# Patient Record
Sex: MALE | Race: WHITE | NOT HISPANIC OR LATINO | Employment: OTHER | URBAN - NONMETROPOLITAN AREA
[De-identification: names, ages, dates, MRNs, and addresses within clinical notes are randomized per-mention and may not be internally consistent; named-entity substitution may affect disease eponyms.]

---

## 2017-04-10 ENCOUNTER — TRANSCRIBE ORDERS (OUTPATIENT)
Dept: LAB | Facility: HOSPITAL | Age: 47
End: 2017-04-10

## 2017-04-10 ENCOUNTER — HOSPITAL ENCOUNTER (OUTPATIENT)
Dept: GENERAL RADIOLOGY | Facility: HOSPITAL | Age: 47
Discharge: HOME | End: 2017-04-10
Admitting: NURSE PRACTITIONER

## 2017-04-10 DIAGNOSIS — M54.2 CERVICALGIA: ICD-10-CM

## 2017-04-10 DIAGNOSIS — M54.2 CERVICALGIA: Primary | ICD-10-CM

## 2017-04-10 PROCEDURE — 72050 X-RAY EXAM NECK SPINE 4/5VWS: CPT | Performed by: RADIOLOGY

## 2017-04-10 PROCEDURE — 72050 X-RAY EXAM NECK SPINE 4/5VWS: CPT

## 2017-05-12 ENCOUNTER — TRANSCRIBE ORDERS (OUTPATIENT)
Dept: ADMINISTRATIVE | Facility: HOSPITAL | Age: 47
End: 2017-05-12

## 2017-05-12 DIAGNOSIS — M54.2 NECK PAIN: Primary | ICD-10-CM

## 2017-05-15 ENCOUNTER — HOSPITAL ENCOUNTER (OUTPATIENT)
Dept: MRI IMAGING | Facility: HOSPITAL | Age: 47
Discharge: HOME | End: 2017-05-15
Admitting: NURSE PRACTITIONER

## 2017-05-15 DIAGNOSIS — M54.2 NECK PAIN: ICD-10-CM

## 2017-05-15 PROCEDURE — 72141 MRI NECK SPINE W/O DYE: CPT

## 2017-05-15 PROCEDURE — 72141 MRI NECK SPINE W/O DYE: CPT | Performed by: RADIOLOGY

## 2017-06-20 ENCOUNTER — OFFICE VISIT (OUTPATIENT)
Dept: NEUROSURGERY | Facility: CLINIC | Age: 47
End: 2017-06-20

## 2017-06-20 VITALS
DIASTOLIC BLOOD PRESSURE: 75 MMHG | SYSTOLIC BLOOD PRESSURE: 120 MMHG | HEIGHT: 70 IN | WEIGHT: 176.8 LBS | TEMPERATURE: 97.7 F | OXYGEN SATURATION: 99 % | BODY MASS INDEX: 25.31 KG/M2 | HEART RATE: 88 BPM

## 2017-06-20 DIAGNOSIS — M50.30 DEGENERATIVE DISC DISEASE, CERVICAL: ICD-10-CM

## 2017-06-20 DIAGNOSIS — M48.02 SPINAL STENOSIS IN CERVICAL REGION: Primary | ICD-10-CM

## 2017-06-20 DIAGNOSIS — M50.20 HNP (HERNIATED NUCLEUS PULPOSUS), CERVICAL: ICD-10-CM

## 2017-06-20 PROCEDURE — 99213 OFFICE O/P EST LOW 20 MIN: CPT | Performed by: NEUROLOGICAL SURGERY

## 2017-06-20 RX ORDER — GABAPENTIN 600 MG/1
300 TABLET ORAL 3 TIMES DAILY
COMMUNITY
End: 2023-02-21 | Stop reason: SDUPTHER

## 2017-06-20 RX ORDER — METHOCARBAMOL 750 MG/1
750 TABLET, FILM COATED ORAL 4 TIMES DAILY PRN
COMMUNITY

## 2017-06-20 NOTE — PROGRESS NOTES
Reji Linares  1970  [id number]      Chief Complaint   Patient presents with   • Back Pain       HISTORY OF PRESENT ILLNESS:  [This is a 46-year-old male who I've seen in the past with advanced and diffused lumbar degenerative osteoarthritis who now is having issues with pain in the cervical region radiating into his left upper extremity.  He is had a cervical MRI and is referred for neurosurgical consultation.  He has not been to physical therapy.  The issues that he has in his lumbar area however her fixed and unchanged. ]    History reviewed. No pertinent past medical history.    No past surgical history on file.    Family History   Problem Relation Age of Onset   • Heart disease Mother    • Arthritis Mother    • Diabetes Mother    • Heart disease Father    • Arthritis Father        Social History     Social History   • Marital status:      Spouse name: N/A   • Number of children: N/A   • Years of education: N/A     Occupational History   • Not on file.     Social History Main Topics   • Smoking status: Never Smoker   • Smokeless tobacco: Not on file   • Alcohol use No   • Drug use: No   • Sexual activity: Defer     Other Topics Concern   • Not on file     Social History Narrative       No Known Allergies      Current Outpatient Prescriptions:   •  gabapentin (NEURONTIN) 300 MG capsule, Take 300 mg by mouth every night at bedtime., Disp: , Rfl:   •  methocarbamol (ROBAXIN) 750 MG tablet, Take 750 mg by mouth 4 (Four) Times a Day As Needed for Muscle Spasms., Disp: , Rfl:   •  esomeprazole (NexIUM) 40 MG capsule, Take 40 mg by mouth daily., Disp: , Rfl:   •  HYDROcodone-acetaminophen (NORCO) 5-325 MG per tablet, Take 1 tablet by mouth every 6 (six) hours as needed., Disp: , Rfl:   •  hydrOXYzine (ATARAX) 25 MG tablet, Take 25 mg by mouth 2 (two) times a day., Disp: , Rfl:   •  nabumetone (RELAFEN) 750 MG tablet, Take 1 tablet by mouth 2 (two) times a day., Disp: 60 tablet, Rfl: 2    Review of Systems    Constitutional: Positive for fatigue. Negative for activity change, appetite change, chills, diaphoresis, fever and unexpected weight change.   HENT: Positive for hearing loss and tinnitus. Negative for congestion, dental problem, drooling, ear discharge, ear pain, facial swelling, mouth sores, nosebleeds, postnasal drip, rhinorrhea, sinus pressure, sneezing, sore throat, trouble swallowing and voice change.    Eyes: Negative for photophobia, pain, discharge, redness, itching and visual disturbance.   Respiratory: Negative for apnea, cough, choking, chest tightness, shortness of breath, wheezing and stridor.    Cardiovascular: Negative for chest pain, palpitations and leg swelling.   Gastrointestinal: Negative for abdominal distention, abdominal pain, anal bleeding, blood in stool, constipation, diarrhea, nausea, rectal pain and vomiting.   Endocrine: Negative for cold intolerance, heat intolerance, polydipsia, polyphagia and polyuria.   Genitourinary: Negative for decreased urine volume, difficulty urinating, dysuria, enuresis, flank pain, frequency, genital sores, hematuria and urgency.   Musculoskeletal: Positive for arthralgias, back pain, gait problem, joint swelling, myalgias, neck pain and neck stiffness.   Skin: Negative for color change, pallor, rash and wound.   Allergic/Immunologic: Negative for environmental allergies, food allergies and immunocompromised state.   Neurological: Positive for numbness and headaches. Negative for dizziness, tremors, seizures, syncope, facial asymmetry, speech difficulty, weakness and light-headedness.   Hematological: Negative for adenopathy. Does not bruise/bleed easily.   Psychiatric/Behavioral: Negative for agitation, behavioral problems, confusion, decreased concentration, dysphoric mood, hallucinations, self-injury, sleep disturbance and suicidal ideas. The patient is not nervous/anxious and is not hyperactive.        Vitals:    06/20/17 1433   BP: 120/75   Pulse: 88  "  Temp: 97.7 °F (36.5 °C)   SpO2: 99%   Weight: 176 lb 12.8 oz (80.2 kg)   Height: 70\" (177.8 cm)       Neurological Examination:  Mental status/speech: The patient is alert and oriented.  Speech is clear without aphysia or dysarthria.  No overt cognitive deficits.    Cranial nerve examination:    Olfaction: Smell is intact.  Vision: Vision is intact without visual field abnormalities.  Funduscopic examination is normal.  No pupillary irregularity.  Ocular motor examination: The extraocular muscles are intact.  There is no diplopia.  The pupil is round and reactive to both light and accommodation.  There is no nystagmus.  Facial movement/sensation: There is no facial weakness.  Sensation is intact in the first, second, and third divisions of the trigeminal nerve.  The corneal reflex is intact.  Auditory: Hearing is intact to finger rub bilaterally.  Cranial nerves IX, X, XI, XII: Phonation is normal.  No dysphagia.  Tongue is protruded in the midline without atrophy.  The gag reflex is intact.  Shoulder shrug is normal.    Musculoligamentous ligamentous examination: [He has significant decreased range of motion of the cervical and lumbar area.  Straight leg raising Conway and flip test produces severe low back pain.  He has increasing pain with bending and squatting and twisting at this examination.  The deep tendon reflexes are generally hypoactive both in the upper and lower extremities.  There is no weakness sensory loss or reflex asymmetry however. ]          Medical Decision Making:     Diagnostic Data Set:  The cervical MRI shows marked disc degeneration, spinal stenosis, and disc protrusion C4-C5; C5-C6 and C6-C7      Assessment:  Advanced degenerative osteoarthritis in the cervical spine associated with spinal stenosis          Recommendations:  I would recommend a conservative course of physical therapy and continue his medications.  He has high-grade spinal stenosis both in the cervical and lumbar regions. "  I would hope we can manage this conservatively.  Although surgical intervention will decompress the spinal canal it is not going to alleviate all the symptoms that he has in the context of degenerative disc disease.        I greatly appreciate the opportunity to see and evaluate this individual.  If you have questions or concerns regarding issues that I may have overlooked please call me at any time: [phone].  Wilmer Holman M.D.  [facility]  [address] [place] [address].  [place] [phone]

## 2017-06-22 DIAGNOSIS — M50.00 INTERVERTEBRAL CERVICAL DISC DISORDER WITH MYELOPATHY, CERVICAL REGION: Primary | ICD-10-CM

## 2018-10-09 ENCOUNTER — TRANSCRIBE ORDERS (OUTPATIENT)
Dept: ADMINISTRATIVE | Facility: HOSPITAL | Age: 48
End: 2018-10-09

## 2018-10-09 DIAGNOSIS — M51.9 LUMBAR DISC DISEASE: Primary | ICD-10-CM

## 2018-10-12 ENCOUNTER — HOSPITAL ENCOUNTER (OUTPATIENT)
Dept: MRI IMAGING | Facility: HOSPITAL | Age: 48
Discharge: HOME | End: 2018-10-12
Admitting: NURSE PRACTITIONER

## 2018-10-12 DIAGNOSIS — M51.9 LUMBAR DISC DISEASE: ICD-10-CM

## 2018-10-12 PROCEDURE — 72148 MRI LUMBAR SPINE W/O DYE: CPT | Performed by: RADIOLOGY

## 2018-10-12 PROCEDURE — 72148 MRI LUMBAR SPINE W/O DYE: CPT

## 2018-10-18 ENCOUNTER — OFFICE VISIT (OUTPATIENT)
Dept: NEUROSURGERY | Facility: CLINIC | Age: 48
End: 2018-10-18

## 2018-10-18 VITALS
WEIGHT: 177 LBS | RESPIRATION RATE: 18 BRPM | TEMPERATURE: 98.2 F | SYSTOLIC BLOOD PRESSURE: 118 MMHG | HEIGHT: 70 IN | OXYGEN SATURATION: 96 % | DIASTOLIC BLOOD PRESSURE: 74 MMHG | HEART RATE: 72 BPM | BODY MASS INDEX: 25.34 KG/M2

## 2018-10-18 DIAGNOSIS — M51.36 DEGENERATIVE DISC DISEASE, LUMBAR: Primary | ICD-10-CM

## 2018-10-18 PROCEDURE — 99203 OFFICE O/P NEW LOW 30 MIN: CPT | Performed by: NEUROLOGICAL SURGERY

## 2018-10-18 RX ORDER — HYDROCODONE BITARTRATE AND ACETAMINOPHEN 10; 325 MG/1; MG/1
1 TABLET ORAL 3 TIMES DAILY
COMMUNITY

## 2018-10-18 RX ORDER — FLUOXETINE 10 MG/1
10 CAPSULE ORAL DAILY
COMMUNITY
End: 2022-01-05

## 2018-10-18 NOTE — PROGRESS NOTES
Reij Linares  1970  [id number]                        CHIEF COMPLAINT:   Cervical and left upper extremity pain; back and bilateral leg pain         MEDICAL HISTORY SINCE LAST ENCOUNTER:  This is a 47-year-old male I saw several years ago with diffuse advanced degenerative osteoarthritis changes in the cervical and lumbar spine.  He had multiple disc degeneration facet arthrosis.  It was my opinion that time that surgery was not a consideration.  Unfortunately he had severe degenerative changes which have gotten progressively worse.  He is currently under pain management, is receiving injections which helped marginally.    He has pain in the cervical area which radiates into both upper extremities and ill-defined nondermatomal distribution.  The pain in the lumbar area radiates into his left lower extremity and is worse with prolonged sitting standing and walking.  He has been to physical therapy to no avail.  His MRIs been repeated and is referred for reevaluation.          History reviewed. No pertinent past medical history.         History reviewed. No pertinent surgical history.           Family History   Problem Relation Age of Onset   • Heart disease Mother    • Arthritis Mother    • Diabetes Mother    • Heart disease Father    • Arthritis Father               Social History     Social History   • Marital status:      Spouse name: N/A   • Number of children: N/A   • Years of education: N/A     Occupational History   • Not on file.     Social History Main Topics   • Smoking status: Never Smoker   • Smokeless tobacco: Never Used   • Alcohol use No   • Drug use: No   • Sexual activity: Defer     Other Topics Concern   • Not on file     Social History Narrative   • No narrative on file            No Known Allergies           Current Outpatient Prescriptions:   •  esomeprazole (NexIUM) 40 MG capsule, Take 40 mg by mouth daily., Disp: , Rfl:   •  FLUoxetine (PROzac) 10 MG capsule, Take 10 mg by mouth  "Daily., Disp: , Rfl:   •  gabapentin (NEURONTIN) 300 MG capsule, Take 300 mg by mouth every night at bedtime., Disp: , Rfl:   •  HYDROcodone-acetaminophen (NORCO)  MG per tablet, Take 1 tablet by mouth Every 6 (Six) Hours As Needed for Moderate Pain ., Disp: , Rfl:   •  hydrOXYzine (ATARAX) 25 MG tablet, Take 25 mg by mouth 2 (two) times a day., Disp: , Rfl:   •  methocarbamol (ROBAXIN) 750 MG tablet, Take 750 mg by mouth 4 (Four) Times a Day As Needed for Muscle Spasms., Disp: , Rfl:   •  nabumetone (RELAFEN) 750 MG tablet, Take 1 tablet by mouth 2 (two) times a day., Disp: 60 tablet, Rfl: 2  •  HYDROcodone-acetaminophen (NORCO) 5-325 MG per tablet, Take 1 tablet by mouth every 6 (six) hours as needed., Disp: , Rfl:          Review of Systems   Constitutional: Positive for activity change and fatigue.   HENT: Positive for hearing loss and tinnitus.    Endocrine: Positive for polyuria.   Genitourinary: Positive for urgency.   Musculoskeletal: Positive for arthralgias, back pain, gait problem, myalgias, neck pain and neck stiffness.   Neurological: Positive for weakness and headaches.   Psychiatric/Behavioral: Positive for agitation, decreased concentration, dysphoric mood and sleep disturbance. The patient is nervous/anxious.    All other systems reviewed and are negative.              Vitals:    10/18/18 1614   BP: 118/74   BP Location: Left arm   Patient Position: Sitting   Pulse: 72   Resp: 18   Temp: 98.2 °F (36.8 °C)   SpO2: 96%   Weight: 80.3 kg (177 lb)   Height: 177.8 cm (70\")               EXAMINATION: He has limitation or range of motion of the cervical lumbar area.  His strength in the upper extremity is normal without reflex asymmetry.  That in his lumbar area shows a positive straight leg raising on the left side.  The Achilles reflex on the left is diminished as compared to the right.  He has decreased sensation of both lower extremities at L5 and S1.            MEDICAL DECISION MAKING: I have " reviewed his cervical and lumbar MRI including the one done more recently.  He has advanced degenerative osteoarthritis of the lumbar spine with multiple bulges of the intervertebral disc L2-L3, L3-L4, L4-L5 and L5-S1.            ASSESSMENT/DISPOSITION:  Although he has significant degenerative disc disease and multiple bulges of intravertebral disc I do not think there is any operation this can resolve the issues that he has.  Certainly he should not have a multilevel spinal fusion within the anticipation of improvement.  I think he has occupational issues and limits on his functional capacity.  This includes avoidance of repetitive bending, stooping climbing and crawling.  He should not sit stand or walk for more than 15-20 minutes without change of position.  He is able to lift only 5-10 pounds repetitively.  I have advised him to seek a medical [facility].  I do not think there is any operation or medicine is going to improve his condition to allow him to return to work.              I APPRECIATE THE OPPORTUNITY OF THIS REFERRAL. PLEASE CALL IF ANY       QUESTIONS [phone]

## 2019-01-03 ENCOUNTER — OFFICE VISIT (OUTPATIENT)
Dept: NEUROSURGERY | Facility: CLINIC | Age: 49
End: 2019-01-03

## 2019-01-03 VITALS
TEMPERATURE: 98 F | RESPIRATION RATE: 18 BRPM | WEIGHT: 178 LBS | SYSTOLIC BLOOD PRESSURE: 126 MMHG | HEART RATE: 81 BPM | BODY MASS INDEX: 25.48 KG/M2 | OXYGEN SATURATION: 99 % | DIASTOLIC BLOOD PRESSURE: 86 MMHG | HEIGHT: 70 IN

## 2019-01-03 DIAGNOSIS — M48.02 SPINAL STENOSIS IN CERVICAL REGION: ICD-10-CM

## 2019-01-03 DIAGNOSIS — M50.30 DEGENERATIVE DISC DISEASE, CERVICAL: ICD-10-CM

## 2019-01-03 DIAGNOSIS — M50.20 HNP (HERNIATED NUCLEUS PULPOSUS), CERVICAL: ICD-10-CM

## 2019-01-03 DIAGNOSIS — M51.36 DEGENERATIVE DISC DISEASE, LUMBAR: Primary | ICD-10-CM

## 2019-01-03 PROCEDURE — 99213 OFFICE O/P EST LOW 20 MIN: CPT | Performed by: NEUROLOGICAL SURGERY

## 2019-01-03 NOTE — PROGRESS NOTES
Reji Linares  1970  [id number]                        CHIEF COMPLAINT:  Lumbar pain radiating into both lower extremities.          MEDICAL HISTORY SINCE LAST ENCOUNTER:  He has shown very little improvement with physical therapy medication and an exercise program.  He has achieved maximal medical improvement in my opinion.           Past Medical History:   Diagnosis Date   • Headache    • Low back pain             History reviewed. No pertinent surgical history.           Family History   Problem Relation Age of Onset   • Heart disease Mother    • Arthritis Mother    • Diabetes Mother    • Heart disease Father    • Arthritis Father               Social History     Socioeconomic History   • Marital status:      Spouse name: Not on file   • Number of children: Not on file   • Years of education: Not on file   • Highest education level: Not on file   Social Needs   • Financial resource strain: Not on file   • Food insecurity - worry: Not on file   • Food insecurity - inability: Not on file   • Transportation needs - medical: Not on file   • Transportation needs - non-medical: Not on file   Occupational History   • Not on file   Tobacco Use   • Smoking status: Never Smoker   • Smokeless tobacco: Never Used   Substance and Sexual Activity   • Alcohol use: No   • Drug use: No   • Sexual activity: Defer   Other Topics Concern   • Not on file   Social History Narrative   • Not on file            No Known Allergies           Current Outpatient Medications:   •  esomeprazole (NexIUM) 40 MG capsule, Take 40 mg by mouth daily., Disp: , Rfl:   •  FLUoxetine (PROzac) 10 MG capsule, Take 10 mg by mouth Daily., Disp: , Rfl:   •  gabapentin (NEURONTIN) 300 MG capsule, Take 300 mg by mouth every night at bedtime., Disp: , Rfl:   •  HYDROcodone-acetaminophen (NORCO)  MG per tablet, Take 1 tablet by mouth Every 6 (Six) Hours As Needed for Moderate Pain ., Disp: , Rfl:   •  HYDROcodone-acetaminophen (NORCO) 5-325 MG  "per tablet, Take 1 tablet by mouth every 6 (six) hours as needed., Disp: , Rfl:   •  hydrOXYzine (ATARAX) 25 MG tablet, Take 25 mg by mouth 2 (two) times a day., Disp: , Rfl:   •  methocarbamol (ROBAXIN) 750 MG tablet, Take 750 mg by mouth 4 (Four) Times a Day As Needed for Muscle Spasms., Disp: , Rfl:   •  nabumetone (RELAFEN) 750 MG tablet, Take 1 tablet by mouth 2 (two) times a day., Disp: 60 tablet, Rfl: 2         Review of Systems   Constitutional: Positive for chills and fatigue.   HENT: Positive for ear pain, hearing loss and tinnitus.    Genitourinary: Positive for difficulty urinating, frequency and urgency.   Musculoskeletal: Positive for arthralgias, back pain, myalgias, neck pain and neck stiffness.   Neurological: Positive for tremors, weakness, numbness and headaches.   Psychiatric/Behavioral: Positive for agitation, decreased concentration, dysphoric mood and sleep disturbance. The patient is nervous/anxious.    All other systems reviewed and are negative.              Vitals:    01/03/19 1617   BP: 126/86   BP Location: Right arm   Patient Position: Sitting   Pulse: 81   Resp: 18   Temp: 98 °F (36.7 °C)   SpO2: 99%   Weight: 80.7 kg (178 lb)   Height: 177.8 cm (70\")               EXAMINATION: Has significant limitation or range of motion lumbar spine.  Decreased sensation in S1 bilaterally with a diminished Achilles reflex.              MEDICAL DECISION MAKING: Reviewed his lumbar MRI diagnostic studies once again            ASSESSMENT/DISPOSITION: I remain of the opinion that surgical intervention is not going to resolve the issues that he has to allow her to return to the type of work to do did previously.  I have urged that he continue to pursue his medical [facility].  I do not know any options that would be meaningful otherwise.  He has extensive disc disease for which there is no medical evidence or assurance that surgical intervention would resolve symptoms and increase  " functionality.]              I APPRECIATE THE OPPORTUNITY OF THIS REFERRAL. PLEASE CALL IF ANY       QUESTIONS [phone]

## 2019-02-14 ENCOUNTER — OFFICE VISIT (OUTPATIENT)
Dept: NEUROSURGERY | Facility: CLINIC | Age: 49
End: 2019-02-14

## 2019-02-14 VITALS
TEMPERATURE: 97 F | DIASTOLIC BLOOD PRESSURE: 66 MMHG | WEIGHT: 156 LBS | OXYGEN SATURATION: 98 % | HEIGHT: 71 IN | BODY MASS INDEX: 21.84 KG/M2 | SYSTOLIC BLOOD PRESSURE: 108 MMHG | RESPIRATION RATE: 20 BRPM | HEART RATE: 72 BPM

## 2019-02-14 DIAGNOSIS — M48.02 SPINAL STENOSIS IN CERVICAL REGION: ICD-10-CM

## 2019-02-14 DIAGNOSIS — M50.20 HNP (HERNIATED NUCLEUS PULPOSUS), CERVICAL: ICD-10-CM

## 2019-02-14 DIAGNOSIS — M51.36 DEGENERATIVE DISC DISEASE, LUMBAR: Primary | ICD-10-CM

## 2019-02-14 PROCEDURE — 99213 OFFICE O/P EST LOW 20 MIN: CPT | Performed by: NEUROLOGICAL SURGERY

## 2019-02-14 NOTE — PROGRESS NOTES
Reji Linares  1970  [id number]                        CHIEF COMPLAINT: Low back pain radiating into both lower extremities; cervical pain.         MEDICAL HISTORY SINCE LAST ENCOUNTER: He has plateaued and unfortunately is at maximal improvement.  Do not know of any modality that is going to increase his functionality and limit his discomfort.  I reviewed his diagnostic studies in detail once again to determine if there is a condition that can be helped surgically.  I do not find that.  Studies have shown degenerative disc disease for which there is no cure.  This is progressive disease.           Past Medical History:   Diagnosis Date   • Headache    • Low back pain             History reviewed. No pertinent surgical history.           Family History   Problem Relation Age of Onset   • Heart disease Mother    • Arthritis Mother    • Diabetes Mother    • Heart disease Father    • Arthritis Father               Social History     Socioeconomic History   • Marital status:      Spouse name: Not on file   • Number of children: Not on file   • Years of education: Not on file   • Highest education level: Not on file   Social Needs   • Financial resource strain: Not on file   • Food insecurity - worry: Not on file   • Food insecurity - inability: Not on file   • Transportation needs - medical: Not on file   • Transportation needs - non-medical: Not on file   Occupational History   • Not on file   Tobacco Use   • Smoking status: Never Smoker   • Smokeless tobacco: Never Used   Substance and Sexual Activity   • Alcohol use: No   • Drug use: No   • Sexual activity: Defer   Other Topics Concern   • Not on file   Social History Narrative   • Not on file            No Known Allergies           Current Outpatient Medications:   •  esomeprazole (NexIUM) 40 MG capsule, Take 40 mg by mouth daily., Disp: , Rfl:   •  FLUoxetine (PROzac) 10 MG capsule, Take 10 mg by mouth Daily., Disp: , Rfl:   •  gabapentin (NEURONTIN)  "300 MG capsule, Take 300 mg by mouth every night at bedtime., Disp: , Rfl:   •  HYDROcodone-acetaminophen (NORCO)  MG per tablet, Take 1 tablet by mouth Every 6 (Six) Hours As Needed for Moderate Pain ., Disp: , Rfl:   •  HYDROcodone-acetaminophen (NORCO) 5-325 MG per tablet, Take 1 tablet by mouth every 6 (six) hours as needed., Disp: , Rfl:   •  hydrOXYzine (ATARAX) 25 MG tablet, Take 25 mg by mouth 2 (two) times a day., Disp: , Rfl:   •  methocarbamol (ROBAXIN) 750 MG tablet, Take 750 mg by mouth 4 (Four) Times a Day As Needed for Muscle Spasms., Disp: , Rfl:   •  nabumetone (RELAFEN) 750 MG tablet, Take 1 tablet by mouth 2 (two) times a day., Disp: 60 tablet, Rfl: 2         Review of Systems   Constitutional: Positive for fatigue.   HENT: Positive for hearing loss.    Respiratory: Positive for apnea.    Genitourinary: Positive for frequency.   Musculoskeletal: Positive for arthralgias, back pain, myalgias and neck pain. Negative for joint swelling.   Neurological: Positive for numbness and headaches.   Psychiatric/Behavioral: Positive for agitation, decreased concentration, dysphoric mood and sleep disturbance. The patient is nervous/anxious.                Vitals:    02/14/19 1512   BP: 128/85   BP Location: Right arm   Patient Position: Sitting   Pulse: 78   Resp: 20   Temp: 97 °F (36.1 °C)   SpO2: 98%   Weight: 81.6 kg (180 lb)   Height: 177.8 cm (70\")               EXAMINATION: He has limitation of the range of motion of the cervical and lumbar spine.  I do not find evidence of weakness although he does have decreased sensation in S1 bilaterally with diminished Achilles reflex.            MEDICAL DECISION MAKING: He has achieved maximum improvement.           ASSESSMENT/DISPOSITION: It is my opinion that he has a functional limitation as a consequence of degenerative osteoarthritis, facet arthrosis and disc degeneration.  This includes avoidance of repetitive bending, stooping, climbing, crawling and " balancing.  He may use his upper extremities.  Standing sitting and walking should be limited to 15-20 minutes without a break.  His maximal lifting is 5-10 pounds.  These are permanent restrictions.  For complete and further limitations a functional capacity evaluation would be warranted.    He does not have a neurosurgical abnormality.  Accordingly I relinquish his care.              I APPRECIATE THE OPPORTUNITY OF THIS REFERRAL. PLEASE CALL IF ANY       QUESTIONS [phone]

## 2019-05-03 ENCOUNTER — TRANSCRIBE ORDERS (OUTPATIENT)
Dept: ADMINISTRATIVE | Facility: HOSPITAL | Age: 49
End: 2019-05-03

## 2019-05-03 ENCOUNTER — HOSPITAL ENCOUNTER (OUTPATIENT)
Dept: GENERAL RADIOLOGY | Facility: HOSPITAL | Age: 49
Discharge: HOME | End: 2019-05-03
Admitting: NURSE PRACTITIONER

## 2019-05-03 DIAGNOSIS — M25.569 KNEE PAIN, UNSPECIFIED CHRONICITY, UNSPECIFIED LATERALITY: Primary | ICD-10-CM

## 2019-05-03 DIAGNOSIS — M25.569 KNEE PAIN, UNSPECIFIED CHRONICITY, UNSPECIFIED LATERALITY: ICD-10-CM

## 2019-05-03 PROCEDURE — 73565 X-RAY EXAM OF KNEES: CPT

## 2019-05-03 PROCEDURE — 73565 X-RAY EXAM OF KNEES: CPT | Performed by: RADIOLOGY

## 2019-08-12 ENCOUNTER — TRANSCRIBE ORDERS (OUTPATIENT)
Dept: ADMINISTRATIVE | Facility: HOSPITAL | Age: 49
End: 2019-08-12

## 2019-08-12 DIAGNOSIS — M54.12 CERVICAL RADICULITIS: Primary | ICD-10-CM

## 2019-08-21 ENCOUNTER — APPOINTMENT (OUTPATIENT)
Dept: MRI IMAGING | Facility: HOSPITAL | Age: 49
End: 2019-08-21

## 2019-08-28 ENCOUNTER — APPOINTMENT (OUTPATIENT)
Dept: MRI IMAGING | Facility: HOSPITAL | Age: 49
End: 2019-08-28

## 2019-08-28 ENCOUNTER — HOSPITAL ENCOUNTER (OUTPATIENT)
Dept: MRI IMAGING | Facility: HOSPITAL | Age: 49
Discharge: HOME | End: 2019-08-28
Admitting: NURSE PRACTITIONER

## 2019-08-28 DIAGNOSIS — M54.12 CERVICAL RADICULITIS: ICD-10-CM

## 2019-08-28 PROCEDURE — 72141 MRI NECK SPINE W/O DYE: CPT | Performed by: RADIOLOGY

## 2019-08-28 PROCEDURE — 72141 MRI NECK SPINE W/O DYE: CPT

## 2019-09-12 ENCOUNTER — OFFICE VISIT (OUTPATIENT)
Dept: PULMONOLOGY | Facility: CLINIC | Age: 49
End: 2019-09-12

## 2019-09-12 VITALS
BODY MASS INDEX: 25.91 KG/M2 | HEIGHT: 70 IN | DIASTOLIC BLOOD PRESSURE: 68 MMHG | OXYGEN SATURATION: 97 % | WEIGHT: 181 LBS | SYSTOLIC BLOOD PRESSURE: 115 MMHG | HEART RATE: 73 BPM | TEMPERATURE: 98.3 F

## 2019-09-12 DIAGNOSIS — R06.02 SHORTNESS OF BREATH: Primary | ICD-10-CM

## 2019-09-12 DIAGNOSIS — G47.33 OSA (OBSTRUCTIVE SLEEP APNEA): ICD-10-CM

## 2019-09-12 PROCEDURE — 99203 OFFICE O/P NEW LOW 30 MIN: CPT | Performed by: NURSE PRACTITIONER

## 2019-09-12 NOTE — PROGRESS NOTES
Were you born premature?  no    Any Childhood infections? no      Breathing problems when you were a child? no    Any childhood allergies?    no             At what age did you begin smoking? Never smoker    Smoking marijuana? no    Any IV drugs? no    How many packs per day? 0    Lung Function Test? no  Chest X-Ray? yes    CT Chest? no Allergy Test? no    Family hx of Lung disease or Lung Cancer?yes    If FHx is posivitive for lung cancer, what is the relationship of the family member? mother    Any hospitalization in the last year? no    How far can you walk without getting short of breath? 50 yards    Any coughing? yes    Any wheezing? Occasionally    Acid Reflux? yes    Do you snore? Unknown    Daytime Fatigue? yes    Any pets? no   Any pet allergies? no    Occupation? [employer]    Have you been exposed to any chemicals at your job? yes    What inhalers are you currently using? None    Have you had the Influenza Vaccine? yes    Would you like to receive this Vaccine today? no    Have you had the Pneumonia Vaccine?  no  Would you like to receive this Vaccine today? no      Subjective    Reji Linares presents for the following Shortness of Breath and Cough      History of Present Illness     Mr. Linares is a 48 year old male that has a medical history significant for back pain and shortness of breath.    He states that his shortness of breath started a couples years ago and has gradually gotten worse.  He also complains of a cough that also started couple years ago and gradually gotten worse.  He states that his cough is dry.  He is currently on no inhalers. He states that he has never smoked, but was exposed to second hand smoke as a child.  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  He voices no history of CHF but does state he had occasionally has some swelling in his lower extremities.  He denies any diagnoses of asthma or allergies.  He also complains of daytime  fatigue.  He states that he wakes up in the morning feeling unrefreshed as if he did not sleep during the night.  He also complains of multiple nighttime awakenings and early morning headaches.      Review of Systems   Constitutional: Negative for activity change, fever and unexpected weight change.   HENT: Negative for congestion, ear pain, postnasal drip, rhinorrhea and sore throat.    Respiratory: Positive for cough, shortness of breath and wheezing (Occasionally). Negative for apnea and chest tightness.    Cardiovascular: Negative for chest pain, palpitations and leg swelling.   Gastrointestinal: Negative for abdominal pain, nausea and vomiting.   Musculoskeletal: Negative for neck pain.   Skin: Negative for rash.   Allergic/Immunologic: Negative for environmental allergies.   Neurological: Positive for headaches.   Psychiatric/Behavioral: Negative for agitation and confusion.       Active Problems:  Problem List Items Addressed This Visit     None      Visit Diagnoses     Shortness of breath    -  Primary    Relevant Orders    Full Pulmonary Function Test With Bronchodilator    Adult Transthoracic Echo Complete W/ Cont if Necessary Per Protocol    MAK (obstructive sleep apnea)        Relevant Orders    Home Sleep Study          Past Medical History:  Past Medical History:   Diagnosis Date   • Headache    • Low back pain        Family History:  Family History   Problem Relation Age of Onset   • Heart disease Mother    • Arthritis Mother    • Diabetes Mother    • Heart disease Father    • Arthritis Father        Social History:  Social History     Tobacco Use   • Smoking status: Never Smoker   • Smokeless tobacco: Never Used   Substance Use Topics   • Alcohol use: No       Current Medications:  Current Outpatient Medications   Medication Sig Dispense Refill   • esomeprazole (NexIUM) 40 MG capsule Take 40 mg by mouth daily.     • FLUoxetine (PROzac) 10 MG capsule Take 10 mg by mouth Daily.     • gabapentin  "(NEURONTIN) 300 MG capsule Take 300 mg by mouth every night at bedtime.     • HYDROcodone-acetaminophen (NORCO)  MG per tablet Take 1 tablet by mouth Every 6 (Six) Hours As Needed for Moderate Pain .     • HYDROcodone-acetaminophen (NORCO) 5-325 MG per tablet Take 1 tablet by mouth every 6 (six) hours as needed.     • hydrOXYzine (ATARAX) 25 MG tablet Take 25 mg by mouth 2 (two) times a day.     • methocarbamol (ROBAXIN) 750 MG tablet Take 750 mg by mouth 4 (Four) Times a Day As Needed for Muscle Spasms.     • nabumetone (RELAFEN) 750 MG tablet Take 1 tablet by mouth 2 (two) times a day. 60 tablet 2     No current facility-administered medications for this visit.        Allergies:  No Known Allergies    Vitals:  /68   Pulse 73   Temp 98.3 °F (36.8 °C) (Oral)   Ht 177.8 cm (70\")   Wt 82.1 kg (181 lb)   SpO2 97%   BMI 25.97 kg/m²     Imaging:    Imaging Results (most recent)     None          Pulmonary Functions Testing Results:    No results found for: FEV1, FVC, XQX9SDF, TLC, DLCO    No results found for this or any previous visit.    Objective   Physical Exam     GENERAL APPEARANCE: Well developed, well nourished, alert and cooperative, and appears to be in no acute distress.    HEAD: normocephalic. Atraumatic.    EYES: PERRL, EOMI. Fundi normal, vision is grossly intact.    THROAT: Oral cavity and pharynx normal. No inflammation, swelling, exudate, or lesions.     NECK: Neck supple.  No thyromegaly.    CARDIAC: Normal S1 and S2. No S3, S4 or murmurs. Rhythm is regular. There is no peripheral edema, cyanosis or pallor. Extremities are warm and well perfused. Capillary refill is less than 2 seconds. No carotid bruits.    RESPIRATORY:Bilateral air entry positive. Bilateral diminished breath sounds. No wheezing, crackles or rhonchi noted.    GI: Positive bowel sounds. Soft, nondistended, nontender.     MUSCULOSKELETAL: No significant deformity or joint abnormality. No edema. Peripheral pulses intact. " No varicosities.    NEUROLOGICAL: Strength and sensation symmetric and intact throughout.     PSYCHIATRIC: The mental examination revealed the patient was oriented to person, place, and time.       Assessment/Plan      Shortness of breath:  -We will order a complete PFT to assess lung function.  -Will order an echo to assess LV function.  -ordered an alpha one antitrypsin level and genotype.  -Will prescribe inhalers based on PFT results.    MAK  -Will order a home sleep study to rule out sleep apnea.  -Patient's Body mass index is 25.97 kg/m². BMI is within normal parameters. No follow-up required.   - Patient was educated on positive airway pressure treatment.  As per CMS guidelines, more than 4 hours on 70% of observed nights is considered adherence. Patient was strongly encouraged to use CPAP as much as possible during sleep as more CPAP use is equal to more benefit. Use of heated humidification in positive airway pressure treatment to improve the adherence to the device.  In case of claustrophobia, we will provide the patient cognitive behavioral therapy and desensitization. Oral appliances use will be discussed with the patient in case of mild to moderate sleep apnea or if the patient with severe disease fail positive airway pressure treatment.       The patient was extensively educated on the consequences of untreated obstructive sleep apnea namely cardiovascular/metabolic disorder, neurocognitive deficit, daytime sleepiness, motor vehicle accidents, depression, mood disorders and reduced quality of life.  At the end of conversation, the patient voices understanding of the disease process and treatment modality.  Patient also understands the risk of untreated obstructive sleep apnea and benefit benefits of the treatment.    Counseling time was greater than 10 minutes.      Social History     Tobacco Use   Smoking Status Never Smoker   Smokeless Tobacco Never Used             ICD-10-CM ICD-9-CM   1. Shortness  of breath R06.02 786.05   2. MAK (obstructive sleep apnea) G47.33 327.23       Return in about 3 months (around 12/12/2019).

## 2019-10-01 ENCOUNTER — HOSPITAL ENCOUNTER (OUTPATIENT)
Dept: RESPIRATORY THERAPY | Facility: HOSPITAL | Age: 49
Discharge: HOME | End: 2019-10-01
Admitting: NURSE PRACTITIONER

## 2019-10-01 DIAGNOSIS — R06.02 SHORTNESS OF BREATH: ICD-10-CM

## 2019-10-01 PROCEDURE — 94060 EVALUATION OF WHEEZING: CPT

## 2019-10-01 PROCEDURE — 94727 GAS DIL/WSHOT DETER LNG VOL: CPT

## 2019-10-01 PROCEDURE — 94729 DIFFUSING CAPACITY: CPT

## 2019-10-01 PROCEDURE — 94060 EVALUATION OF WHEEZING: CPT | Performed by: INTERNAL MEDICINE

## 2019-10-01 PROCEDURE — 94727 GAS DIL/WSHOT DETER LNG VOL: CPT | Performed by: INTERNAL MEDICINE

## 2019-10-01 PROCEDURE — 94640 AIRWAY INHALATION TREATMENT: CPT

## 2019-10-01 PROCEDURE — 94729 DIFFUSING CAPACITY: CPT | Performed by: INTERNAL MEDICINE

## 2019-10-01 RX ORDER — ALBUTEROL SULFATE 2.5 MG/3ML
2.5 SOLUTION RESPIRATORY (INHALATION) ONCE
Status: COMPLETED | OUTPATIENT
Start: 2019-10-01 | End: 2019-10-01

## 2019-10-01 RX ADMIN — ALBUTEROL SULFATE 2.5 MG: 2.5 SOLUTION RESPIRATORY (INHALATION) at 09:46

## 2019-10-04 ENCOUNTER — DOCUMENTATION (OUTPATIENT)
Dept: PULMONOLOGY | Facility: CLINIC | Age: 49
End: 2019-10-04

## 2019-10-07 ENCOUNTER — OFFICE VISIT (OUTPATIENT)
Dept: NEUROSURGERY | Facility: CLINIC | Age: 49
End: 2019-10-07

## 2019-10-07 VITALS
SYSTOLIC BLOOD PRESSURE: 118 MMHG | BODY MASS INDEX: 26.05 KG/M2 | DIASTOLIC BLOOD PRESSURE: 82 MMHG | TEMPERATURE: 97.2 F | HEIGHT: 70 IN | WEIGHT: 182 LBS

## 2019-10-07 DIAGNOSIS — J98.4 RESTRICTIVE LUNG DISEASE: Primary | ICD-10-CM

## 2019-10-07 DIAGNOSIS — M50.30 DEGENERATIVE DISC DISEASE, CERVICAL: ICD-10-CM

## 2019-10-07 DIAGNOSIS — M48.02 SPINAL STENOSIS IN CERVICAL REGION: Primary | ICD-10-CM

## 2019-10-07 PROCEDURE — 99213 OFFICE O/P EST LOW 20 MIN: CPT | Performed by: NEUROLOGICAL SURGERY

## 2019-10-07 RX ORDER — CELECOXIB 200 MG/1
200 CAPSULE ORAL 2 TIMES DAILY
Qty: 60 CAPSULE | Refills: 0 | Status: SHIPPED | OUTPATIENT
Start: 2019-10-07 | End: 2019-12-03 | Stop reason: SDUPTHER

## 2019-10-07 NOTE — PATIENT INSTRUCTIONS
Call Dr. Holman on a Monday or Tuesday with an update.      Ask for Annelise () and leave a message for  Dr. Holman.     He will call you back at the end of the day as soon as he can.     [phone]

## 2019-10-07 NOTE — PROGRESS NOTES
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  In the past his diagnostic has shown significant anatomical changes yet do not provide anatomical/clinical correlation.  He has been treated conservatively and continues to do so.  With the past several months however he is developed severe suboccipital and occipital headache; pain rating into his right upper extremity associated with paresthesia and numbness and persistent low back pain.  The issue at this encounter however it seems to be primarily cervical.    He has received \"shots\" in his neck by pain management have not helped resolve the issue.  Because of increasing headaches and neck pain cervical MRI was repeated and he is referred for neurosurgical reevaluation.           Past Medical History:   Diagnosis Date   • Headache    • Low back pain             No past surgical history on file.           Family History   Problem Relation Age of Onset   • Heart disease Mother    • Arthritis Mother    • Diabetes Mother    • Heart disease Father    • Arthritis Father               Social History     Socioeconomic History   • Marital status:      Spouse name: Not on file   • Number of children: Not on file   • Years of education: Not on file   • Highest education level: Not on file   Tobacco Use   • Smoking status: Never Smoker   • Smokeless tobacco: Never Used   Substance and Sexual Activity   • Alcohol use: No   • Drug use: No   • Sexual activity: Defer            No Known Allergies           Current Outpatient Medications:   •  esomeprazole (NexIUM) 40 MG capsule, Take 40 mg by mouth daily., Disp: , Rfl:   •  FLUoxetine (PROzac) 10 MG capsule, Take 10 mg by mouth Daily., Disp: , Rfl:   •  gabapentin (NEURONTIN) 300 MG capsule, " Take 300 mg by mouth every night at bedtime., Disp: , Rfl:   •  HYDROcodone-acetaminophen (NORCO)  MG per tablet, Take 1 tablet by mouth Every 6 (Six) Hours As Needed for Moderate Pain ., Disp: , Rfl:   •  hydrOXYzine (ATARAX) 25 MG tablet, Take 25 mg by mouth 2 (two) times a day., Disp: , Rfl:   •  methocarbamol (ROBAXIN) 750 MG tablet, Take 750 mg by mouth 4 (Four) Times a Day As Needed for Muscle Spasms., Disp: , Rfl:   •  nabumetone (RELAFEN) 750 MG tablet, Take 1 tablet by mouth 2 (two) times a day., Disp: 60 tablet, Rfl: 2         Review of Systems   Constitutional: Positive for fatigue. Negative for activity change, appetite change, chills, diaphoresis, fever and unexpected weight change.   HENT: Positive for hearing loss, tinnitus and trouble swallowing. Negative for congestion, dental problem, drooling, ear discharge, ear pain, facial swelling, mouth sores, nosebleeds, postnasal drip, rhinorrhea, sinus pressure, sneezing, sore throat and voice change.    Eyes: Negative for photophobia, pain, discharge, redness, itching and visual disturbance.   Respiratory: Positive for cough and choking. Negative for apnea, chest tightness, shortness of breath, wheezing and stridor.    Cardiovascular: Negative for chest pain, palpitations and leg swelling.   Gastrointestinal: Negative for abdominal distention, abdominal pain, anal bleeding, blood in stool, constipation, diarrhea, nausea, rectal pain and vomiting.   Endocrine: Negative for cold intolerance, heat intolerance, polydipsia, polyphagia and polyuria.   Genitourinary: Negative for decreased urine volume, difficulty urinating, dysuria, enuresis, flank pain, frequency, genital sores, hematuria and urgency.   Musculoskeletal: Positive for arthralgias, back pain, gait problem, myalgias, neck pain and neck stiffness. Negative for joint swelling.   Skin: Negative for color change, pallor, rash and wound.   Allergic/Immunologic: Negative for environmental  "allergies, food allergies and immunocompromised state.   Neurological: Positive for weakness, light-headedness, numbness and headaches. Negative for dizziness, tremors, seizures, syncope, facial asymmetry and speech difficulty.   Hematological: Negative for adenopathy. Does not bruise/bleed easily.   Psychiatric/Behavioral: Positive for dysphoric mood. Negative for agitation, behavioral problems, confusion, decreased concentration, hallucinations, self-injury, sleep disturbance and suicidal ideas. The patient is nervous/anxious. The patient is not hyperactive.    All other systems reviewed and are negative.              Vitals:    10/07/19 1408   BP: 118/82   BP Location: Right arm   Patient Position: Sitting   Cuff Size: Adult   Temp: 97.2 °F (36.2 °C)   TempSrc: Temporal   Weight: 82.6 kg (182 lb)   Height: 177.8 cm (70\")               EXAMINATION: He has slight decreased range of motion of the cervical lumbar spine.  He tends to \"give\" with formal muscle testing in the upper and lower extremities.  The right biceps reflex is slightly diminished compared to the others yet is elicitable.  There is no Babinski, Hermelindo or clonus.  His gait is normal.  There is no ataxia or dysmetria            MEDICAL DECISION MAKING: The cervical MRI shows advanced degenerative osteoarthritis similar to that done in the past.  He has moderately severe spinal stenosis.  There is no abnormal signal within the spinal cord.  Disc degeneration tends to deviate a bit more toward the left than the right.  I am unable to see a focal disc protrusion that would provide anatomical/clinical correlation sufficiently to warrant surgical intervention.           ASSESSMENT/DISPOSITION: I have suggested further studies.  This would include cervical myelography, post myelography CT scanning, EMG and NCV of the right upper extremity.  In the meanwhile I have given a prescription of Relafen 750 mg twice daily and Robaxin-750 milligrams at night.  I " will keep you informed as to the results of the studies.  Thank you for allowing me to see him.              I APPRECIATE THE OPPORTUNITY OF THIS REFERRAL. PLEASE CALL IF ANY       QUESTIONS [phone]

## 2019-10-07 NOTE — H&P (VIEW-ONLY)
"Reji Linares  1970  [id number]                        CHIEF COMPLAINT: Cervical and right upper extremity pain and numbness         MEDICAL HISTORY SINCE LAST ENCOUNTER: This is a 48-year-old male with documented degenerative osteoarthritis of both the cervical and lumbar spine.  In the past his diagnostic has shown significant anatomical changes yet do not provide anatomical/clinical correlation.  He has been treated conservatively and continues to do so.  With the past several months however he is developed severe suboccipital and occipital headache; pain rating into his right upper extremity associated with paresthesia and numbness and persistent low back pain.  The issue at this encounter however it seems to be primarily cervical.    He has received \"shots\" in his neck by pain management have not helped resolve the issue.  Because of increasing headaches and neck pain cervical MRI was repeated and he is referred for neurosurgical reevaluation.           Past Medical History:   Diagnosis Date   • Headache    • Low back pain             No past surgical history on file.           Family History   Problem Relation Age of Onset   • Heart disease Mother    • Arthritis Mother    • Diabetes Mother    • Heart disease Father    • Arthritis Father               Social History     Socioeconomic History   • Marital status:      Spouse name: Not on file   • Number of children: Not on file   • Years of education: Not on file   • Highest education level: Not on file   Tobacco Use   • Smoking status: Never Smoker   • Smokeless tobacco: Never Used   Substance and Sexual Activity   • Alcohol use: No   • Drug use: No   • Sexual activity: Defer            No Known Allergies           Current Outpatient Medications:   •  esomeprazole (NexIUM) 40 MG capsule, Take 40 mg by mouth daily., Disp: , Rfl:   •  FLUoxetine (PROzac) 10 MG capsule, Take 10 mg by mouth Daily., Disp: , Rfl:   •  gabapentin (NEURONTIN) 300 MG capsule, " Take 300 mg by mouth every night at bedtime., Disp: , Rfl:   •  HYDROcodone-acetaminophen (NORCO)  MG per tablet, Take 1 tablet by mouth Every 6 (Six) Hours As Needed for Moderate Pain ., Disp: , Rfl:   •  hydrOXYzine (ATARAX) 25 MG tablet, Take 25 mg by mouth 2 (two) times a day., Disp: , Rfl:   •  methocarbamol (ROBAXIN) 750 MG tablet, Take 750 mg by mouth 4 (Four) Times a Day As Needed for Muscle Spasms., Disp: , Rfl:   •  nabumetone (RELAFEN) 750 MG tablet, Take 1 tablet by mouth 2 (two) times a day., Disp: 60 tablet, Rfl: 2         Review of Systems   Constitutional: Positive for fatigue. Negative for activity change, appetite change, chills, diaphoresis, fever and unexpected weight change.   HENT: Positive for hearing loss, tinnitus and trouble swallowing. Negative for congestion, dental problem, drooling, ear discharge, ear pain, facial swelling, mouth sores, nosebleeds, postnasal drip, rhinorrhea, sinus pressure, sneezing, sore throat and voice change.    Eyes: Negative for photophobia, pain, discharge, redness, itching and visual disturbance.   Respiratory: Positive for cough and choking. Negative for apnea, chest tightness, shortness of breath, wheezing and stridor.    Cardiovascular: Negative for chest pain, palpitations and leg swelling.   Gastrointestinal: Negative for abdominal distention, abdominal pain, anal bleeding, blood in stool, constipation, diarrhea, nausea, rectal pain and vomiting.   Endocrine: Negative for cold intolerance, heat intolerance, polydipsia, polyphagia and polyuria.   Genitourinary: Negative for decreased urine volume, difficulty urinating, dysuria, enuresis, flank pain, frequency, genital sores, hematuria and urgency.   Musculoskeletal: Positive for arthralgias, back pain, gait problem, myalgias, neck pain and neck stiffness. Negative for joint swelling.   Skin: Negative for color change, pallor, rash and wound.   Allergic/Immunologic: Negative for environmental  "allergies, food allergies and immunocompromised state.   Neurological: Positive for weakness, light-headedness, numbness and headaches. Negative for dizziness, tremors, seizures, syncope, facial asymmetry and speech difficulty.   Hematological: Negative for adenopathy. Does not bruise/bleed easily.   Psychiatric/Behavioral: Positive for dysphoric mood. Negative for agitation, behavioral problems, confusion, decreased concentration, hallucinations, self-injury, sleep disturbance and suicidal ideas. The patient is nervous/anxious. The patient is not hyperactive.    All other systems reviewed and are negative.              Vitals:    10/07/19 1408   BP: 118/82   BP Location: Right arm   Patient Position: Sitting   Cuff Size: Adult   Temp: 97.2 °F (36.2 °C)   TempSrc: Temporal   Weight: 82.6 kg (182 lb)   Height: 177.8 cm (70\")               EXAMINATION: He has slight decreased range of motion of the cervical lumbar spine.  He tends to \"give\" with formal muscle testing in the upper and lower extremities.  The right biceps reflex is slightly diminished compared to the others yet is elicitable.  There is no Babinski, Hermelindo or clonus.  His gait is normal.  There is no ataxia or dysmetria            MEDICAL DECISION MAKING: The cervical MRI shows advanced degenerative osteoarthritis similar to that done in the past.  He has moderately severe spinal stenosis.  There is no abnormal signal within the spinal cord.  Disc degeneration tends to deviate a bit more toward the left than the right.  I am unable to see a focal disc protrusion that would provide anatomical/clinical correlation sufficiently to warrant surgical intervention.           ASSESSMENT/DISPOSITION: I have suggested further studies.  This would include cervical myelography, post myelography CT scanning, EMG and NCV of the right upper extremity.  In the meanwhile I have given a prescription of Relafen 750 mg twice daily and Robaxin-750 milligrams at night.  I " will keep you informed as to the results of the studies.  Thank you for allowing me to see him.              I APPRECIATE THE OPPORTUNITY OF THIS REFERRAL. PLEASE CALL IF ANY       QUESTIONS [phone]

## 2019-10-08 ENCOUNTER — HOSPITAL ENCOUNTER (OUTPATIENT)
Dept: CARDIOLOGY | Facility: HOSPITAL | Age: 49
Discharge: HOME | End: 2019-10-08
Admitting: NURSE PRACTITIONER

## 2019-10-08 DIAGNOSIS — R06.02 SHORTNESS OF BREATH: ICD-10-CM

## 2019-10-08 PROBLEM — M50.30 DEGENERATIVE DISC DISEASE, CERVICAL: Status: ACTIVE | Noted: 2019-10-08

## 2019-10-08 PROBLEM — M48.02 SPINAL STENOSIS IN CERVICAL REGION: Status: ACTIVE | Noted: 2019-10-08

## 2019-10-08 LAB
BH CV ECHO MEAS - % IVS THICK: -27.8 %
BH CV ECHO MEAS - % LVPW THICK: 15.6 %
BH CV ECHO MEAS - ACS: 2.1 CM
BH CV ECHO MEAS - AO MAX PG: 6.4 MMHG
BH CV ECHO MEAS - AO MEAN PG: 3 MMHG
BH CV ECHO MEAS - AO ROOT AREA (BSA CORRECTED): 1.5
BH CV ECHO MEAS - AO ROOT AREA: 7.4 CM^2
BH CV ECHO MEAS - AO ROOT DIAM: 3.1 CM
BH CV ECHO MEAS - AO V2 MAX: 126.2 CM/SEC
BH CV ECHO MEAS - AO V2 MEAN: 77.3 CM/SEC
BH CV ECHO MEAS - AO V2 VTI: 27.7 CM
BH CV ECHO MEAS - BSA(HAYCOCK): 2 M^2
BH CV ECHO MEAS - BSA: 2 M^2
BH CV ECHO MEAS - BZI_BMI: 26.1 KILOGRAMS/M^2
BH CV ECHO MEAS - BZI_METRIC_HEIGHT: 177.8 CM
BH CV ECHO MEAS - BZI_METRIC_WEIGHT: 82.6 KG
BH CV ECHO MEAS - EDV(CUBED): 99 ML
BH CV ECHO MEAS - EDV(MOD-SP4): 52 ML
BH CV ECHO MEAS - EDV(TEICH): 98.6 ML
BH CV ECHO MEAS - EF(CUBED): 75.3 %
BH CV ECHO MEAS - EF(MOD-SP4): 73.1 %
BH CV ECHO MEAS - EF(TEICH): 67.2 %
BH CV ECHO MEAS - ESV(CUBED): 24.5 ML
BH CV ECHO MEAS - ESV(MOD-SP4): 14 ML
BH CV ECHO MEAS - ESV(TEICH): 32.3 ML
BH CV ECHO MEAS - FS: 37.2 %
BH CV ECHO MEAS - IVS/LVPW: 1.3
BH CV ECHO MEAS - IVSD: 1.3 CM
BH CV ECHO MEAS - IVSS: 0.93 CM
BH CV ECHO MEAS - LA DIMENSION: 3.4 CM
BH CV ECHO MEAS - LA/AO: 1.1
BH CV ECHO MEAS - LV DIASTOLIC VOL/BSA (35-75): 25.9 ML/M^2
BH CV ECHO MEAS - LV MASS(C)D: 196 GRAMS
BH CV ECHO MEAS - LV MASS(C)DI: 97.7 GRAMS/M^2
BH CV ECHO MEAS - LV MASS(C)S: 85.3 GRAMS
BH CV ECHO MEAS - LV MASS(C)SI: 42.5 GRAMS/M^2
BH CV ECHO MEAS - LV SYSTOLIC VOL/BSA (12-30): 7 ML/M^2
BH CV ECHO MEAS - LVIDD: 4.6 CM
BH CV ECHO MEAS - LVIDS: 2.9 CM
BH CV ECHO MEAS - LVLD AP4: 5.4 CM
BH CV ECHO MEAS - LVLS AP4: 4.7 CM
BH CV ECHO MEAS - LVOT AREA (M): 2.8 CM^2
BH CV ECHO MEAS - LVOT AREA: 2.8 CM^2
BH CV ECHO MEAS - LVOT DIAM: 1.9 CM
BH CV ECHO MEAS - LVPWD: 1 CM
BH CV ECHO MEAS - LVPWS: 1.2 CM
BH CV ECHO MEAS - MV A MAX VEL: 61.7 CM/SEC
BH CV ECHO MEAS - MV E MAX VEL: 79 CM/SEC
BH CV ECHO MEAS - MV E/A: 1.3
BH CV ECHO MEAS - PA ACC SLOPE: 705.4 CM/SEC^2
BH CV ECHO MEAS - PA ACC TIME: 0.17 SEC
BH CV ECHO MEAS - PA PR(ACCEL): 4.5 MMHG
BH CV ECHO MEAS - RAP SYSTOLE: 10 MMHG
BH CV ECHO MEAS - RVSP: 38 MMHG
BH CV ECHO MEAS - SI(AO): 102.8 ML/M^2
BH CV ECHO MEAS - SI(CUBED): 37.1 ML/M^2
BH CV ECHO MEAS - SI(MOD-SP4): 18.9 ML/M^2
BH CV ECHO MEAS - SI(TEICH): 33.1 ML/M^2
BH CV ECHO MEAS - SV(AO): 206.2 ML
BH CV ECHO MEAS - SV(CUBED): 74.5 ML
BH CV ECHO MEAS - SV(MOD-SP4): 38 ML
BH CV ECHO MEAS - SV(TEICH): 66.3 ML
BH CV ECHO MEAS - TR MAX VEL: 264.7 CM/SEC
MAXIMAL PREDICTED HEART RATE: 170 BPM
STRESS TARGET HR: 145 BPM

## 2019-10-08 PROCEDURE — 93306 TTE W/DOPPLER COMPLETE: CPT

## 2019-10-08 PROCEDURE — 93306 TTE W/DOPPLER COMPLETE: CPT | Performed by: INTERNAL MEDICINE

## 2019-10-17 DIAGNOSIS — G47.33 OSA (OBSTRUCTIVE SLEEP APNEA): Primary | ICD-10-CM

## 2019-10-17 NOTE — PROGRESS NOTES
Sleep study reviewed.  Patient noted to have mild sleep apnea.  We will start him on AutoPap therapy.

## 2019-10-18 ENCOUNTER — TELEPHONE (OUTPATIENT)
Dept: PULMONOLOGY | Facility: CLINIC | Age: 49
End: 2019-10-18

## 2019-10-18 NOTE — TELEPHONE ENCOUNTER
Geeta Vasquez, MERLIN Madden, Amish Holden MA              Will you let him know that he does have mild sleep apnea.  I am starting him on an autopap

## 2019-10-29 ENCOUNTER — OFFICE VISIT (OUTPATIENT)
Dept: NEUROSURGERY | Facility: CLINIC | Age: 49
End: 2019-10-29

## 2019-10-29 ENCOUNTER — HOSPITAL ENCOUNTER (OUTPATIENT)
Facility: HOSPITAL | Age: 49
Discharge: HOME | End: 2019-10-29
Attending: RADIOLOGY | Admitting: RADIOLOGY

## 2019-10-29 ENCOUNTER — HOSPITAL ENCOUNTER (OUTPATIENT)
Dept: NEUROLOGY | Facility: HOSPITAL | Age: 49
Discharge: HOME | End: 2019-10-29

## 2019-10-29 ENCOUNTER — APPOINTMENT (OUTPATIENT)
Dept: CT IMAGING | Facility: HOSPITAL | Age: 49
End: 2019-10-29

## 2019-10-29 VITALS
TEMPERATURE: 97.3 F | OXYGEN SATURATION: 96 % | WEIGHT: 182.76 LBS | SYSTOLIC BLOOD PRESSURE: 129 MMHG | HEART RATE: 85 BPM | HEIGHT: 71 IN | DIASTOLIC BLOOD PRESSURE: 101 MMHG | BODY MASS INDEX: 25.59 KG/M2

## 2019-10-29 DIAGNOSIS — M48.02 SPINAL STENOSIS IN CERVICAL REGION: ICD-10-CM

## 2019-10-29 DIAGNOSIS — M51.36 DEGENERATIVE DISC DISEASE, LUMBAR: ICD-10-CM

## 2019-10-29 DIAGNOSIS — M50.30 DEGENERATIVE DISC DISEASE, CERVICAL: ICD-10-CM

## 2019-10-29 DIAGNOSIS — M50.30 DEGENERATIVE DISC DISEASE, CERVICAL: Primary | ICD-10-CM

## 2019-10-29 PROCEDURE — 72240 MYELOGRAPHY NECK SPINE: CPT | Performed by: RADIOLOGY

## 2019-10-29 PROCEDURE — 95908 NRV CNDJ TST 3-4 STUDIES: CPT

## 2019-10-29 PROCEDURE — 61055 INJECTION INTO BRAIN CANAL: CPT | Performed by: RADIOLOGY

## 2019-10-29 PROCEDURE — 72126 CT NECK SPINE W/DYE: CPT

## 2019-10-29 PROCEDURE — 99213 OFFICE O/P EST LOW 20 MIN: CPT | Performed by: NEUROLOGICAL SURGERY

## 2019-10-29 PROCEDURE — 95886 MUSC TEST DONE W/N TEST COMP: CPT

## 2019-10-29 PROCEDURE — 25010000002 IOPAMIDOL 61 % SOLUTION: Performed by: RADIOLOGY

## 2019-10-29 RX ORDER — LIDOCAINE HYDROCHLORIDE 10 MG/ML
INJECTION, SOLUTION EPIDURAL; INFILTRATION; INTRACAUDAL; PERINEURAL AS NEEDED
Status: DISCONTINUED | OUTPATIENT
Start: 2019-10-29 | End: 2019-10-29 | Stop reason: HOSPADM

## 2019-10-29 NOTE — PROGRESS NOTES
Reji Linares  1970  [id number]                        CHIEF COMPLAINT: Neck pain and headache         MEDICAL HISTORY SINCE LAST ENCOUNTER: This 48-year-old had myelogram post myelogram CT scan as well as EMG/NCV today.  Symptoms are unchanged.           Past Medical History:   Diagnosis Date   • Headache    • Low back pain    • Sleep apnea             No past surgical history on file.           Family History   Problem Relation Age of Onset   • Heart disease Mother    • Arthritis Mother    • Diabetes Mother    • Heart disease Father    • Arthritis Father               Social History     Socioeconomic History   • Marital status:      Spouse name: Not on file   • Number of children: Not on file   • Years of education: Not on file   • Highest education level: Not on file   Tobacco Use   • Smoking status: Never Smoker   • Smokeless tobacco: Never Used   Substance and Sexual Activity   • Alcohol use: No   • Drug use: No   • Sexual activity: Defer            No Known Allergies           Current Outpatient Medications:   •  celecoxib (CeleBREX) 200 MG capsule, Take 1 capsule by mouth 2 (Two) Times a Day., Disp: 60 capsule, Rfl: 0  •  esomeprazole (NexIUM) 40 MG capsule, Take 40 mg by mouth daily., Disp: , Rfl:   •  FLUoxetine (PROzac) 10 MG capsule, Take 10 mg by mouth Daily., Disp: , Rfl:   •  gabapentin (NEURONTIN) 300 MG capsule, Take 300 mg by mouth 4 (Four) Times a Day., Disp: , Rfl:   •  HYDROcodone-acetaminophen (NORCO)  MG per tablet, Take 1 tablet by mouth Every 6 (Six) Hours As Needed for Moderate Pain ., Disp: , Rfl:   •  hydrOXYzine (ATARAX) 25 MG tablet, Take 25 mg by mouth 2 (two) times a day., Disp: , Rfl:   •  methocarbamol (ROBAXIN) 750 MG tablet, Take 750 mg by mouth 4 (Four) Times a Day As Needed for Muscle Spasms., Disp: , Rfl:   No current facility-administered medications for this visit.          Review of Systems   Constitutional: Positive for fatigue. Negative for activity  change, appetite change, chills, diaphoresis, fever and unexpected weight change.   HENT: Positive for hearing loss, tinnitus and trouble swallowing. Negative for congestion, dental problem, drooling, ear discharge, ear pain, facial swelling, mouth sores, nosebleeds, postnasal drip, rhinorrhea, sinus pressure, sneezing, sore throat and voice change.    Eyes: Negative for photophobia, pain, discharge, redness, itching and visual disturbance.   Respiratory: Positive for cough and choking. Negative for apnea, chest tightness, shortness of breath, wheezing and stridor.    Cardiovascular: Negative for chest pain, palpitations and leg swelling.   Gastrointestinal: Negative for abdominal distention, abdominal pain, anal bleeding, blood in stool, constipation, diarrhea, nausea, rectal pain and vomiting.   Endocrine: Negative for cold intolerance, heat intolerance, polydipsia, polyphagia and polyuria.   Genitourinary: Negative for decreased urine volume, difficulty urinating, dysuria, enuresis, flank pain, frequency, genital sores, hematuria and urgency.   Musculoskeletal: Positive for arthralgias, back pain, gait problem, myalgias, neck pain and neck stiffness. Negative for joint swelling.   Skin: Negative for color change, pallor, rash and wound.   Allergic/Immunologic: Negative for environmental allergies, food allergies and immunocompromised state.   Neurological: Positive for weakness, light-headedness, numbness and headaches. Negative for dizziness, tremors, seizures, syncope, facial asymmetry and speech difficulty.   Hematological: Negative for adenopathy. Does not bruise/bleed easily.   Psychiatric/Behavioral: Positive for dysphoric mood. Negative for agitation, behavioral problems, confusion, decreased concentration, hallucinations, self-injury, sleep disturbance and suicidal ideas. The patient is nervous/anxious. The patient is not hyperactive.    All other systems reviewed and are negative.            There were  no vitals filed for this visit.            EXAMINATION: He has marked limitation of range of motion of the cervical spine including flexion extension lateral rotation lateral bending.  He is hyperreflexic without focal weakness            MEDICAL DECISION MAKING: The diagnostic studies show severe degenerative osteoarthritis throughout the cervical spine.  He has had osteophyte formation, mild spinal stenosis and neuroforaminal closure.  EMG and NCV does not show high-grade nerve root injury           ASSESSMENT/DISPOSITION: Unfortunately has advanced, diffuse cervical osteoarthritis which is genetically linked and there is little to offer him other than NSAIDs.  Surgery is not an option.  I have completed physical capacity evaluations and presented to him.              I APPRECIATE THE OPPORTUNITY OF THIS REFERRAL. PLEASE CALL IF ANY       QUESTIONS [phone]

## 2019-10-29 NOTE — PATIENT INSTRUCTIONS
Call Dr. Holman on a Monday or Tuesday with an update.      Ask for Annelise () and leave a message for  Dr. Holman.     He will call you back at the end of the day as soon as he can.     [phone]

## 2019-10-30 ENCOUNTER — HOSPITAL ENCOUNTER (OUTPATIENT)
Dept: CT IMAGING | Facility: HOSPITAL | Age: 49
Discharge: HOME | End: 2019-10-30
Admitting: NURSE PRACTITIONER

## 2019-10-30 DIAGNOSIS — J98.4 RESTRICTIVE LUNG DISEASE: ICD-10-CM

## 2019-10-30 PROCEDURE — 71250 CT THORAX DX C-: CPT | Performed by: RADIOLOGY

## 2019-10-30 PROCEDURE — 71250 CT THORAX DX C-: CPT

## 2019-10-31 ENCOUNTER — TELEPHONE (OUTPATIENT)
Dept: PULMONOLOGY | Facility: CLINIC | Age: 49
End: 2019-10-31

## 2019-10-31 NOTE — TELEPHONE ENCOUNTER
Geeta Vasquez, MERLIN Madden, Amish Holden MA             Will you let him know that is CT was normal.

## 2019-11-26 ENCOUNTER — OFFICE VISIT (OUTPATIENT)
Dept: PULMONOLOGY | Facility: CLINIC | Age: 49
End: 2019-11-26

## 2019-11-26 VITALS
WEIGHT: 188 LBS | OXYGEN SATURATION: 98 % | DIASTOLIC BLOOD PRESSURE: 72 MMHG | BODY MASS INDEX: 26.92 KG/M2 | TEMPERATURE: 98.1 F | HEIGHT: 70 IN | HEART RATE: 89 BPM | SYSTOLIC BLOOD PRESSURE: 108 MMHG

## 2019-11-26 DIAGNOSIS — R53.83 FATIGUE, UNSPECIFIED TYPE: ICD-10-CM

## 2019-11-26 DIAGNOSIS — R05.3 PERSISTENT DRY COUGH: ICD-10-CM

## 2019-11-26 DIAGNOSIS — G47.33 OBSTRUCTIVE SLEEP APNEA: Primary | ICD-10-CM

## 2019-11-26 DIAGNOSIS — R06.09 DYSPNEA ON EXERTION: ICD-10-CM

## 2019-11-26 PROCEDURE — 99214 OFFICE O/P EST MOD 30 MIN: CPT | Performed by: PHYSICIAN ASSISTANT

## 2019-11-26 RX ORDER — ALBUTEROL SULFATE 90 UG/1
2 AEROSOL, METERED RESPIRATORY (INHALATION) EVERY 4 HOURS PRN
Qty: 1 INHALER | Refills: 8 | Status: SHIPPED | OUTPATIENT
Start: 2019-11-26 | End: 2020-06-09 | Stop reason: SDUPTHER

## 2019-11-26 RX ORDER — GUAIFENESIN 600 MG/1
600 TABLET, EXTENDED RELEASE ORAL 2 TIMES DAILY
Qty: 20 TABLET | Refills: 6 | Status: SHIPPED | OUTPATIENT
Start: 2019-11-26 | End: 2019-12-06

## 2019-11-26 RX ORDER — MONTELUKAST SODIUM 10 MG/1
10 TABLET ORAL NIGHTLY
Qty: 30 TABLET | Refills: 11 | Status: SHIPPED | OUTPATIENT
Start: 2019-11-26 | End: 2019-12-27 | Stop reason: SDUPTHER

## 2019-11-26 NOTE — PROGRESS NOTES
Interval history since last visit: Cough and smothering    Recent hospitalizations: None    Investigations (imaging, PFT's, labs, sleep study, record requests, etc.) PFT    Have you had the Influenza Vaccine? yes    Would you like to receive this Vaccine today? no    Have you had the Pneumonia Vaccine?  no  Would you like to receive this Vaccine today? no    Subjective    Reji Linares presents for the following Cough and Shortness of Breath      History of Present Illness     Mr. Linares presents today for follow up of persistent cough and shortness of breath. He reports some chest tightness and congestion today, but typically has a dry cough and is unable to cough anything up. This has been ongoing over the last several months, without significant change since the previous visit.  Over the past several years, he was intermittently able to produce phlegm, but has had recent difficulty with this.  He notes dyspnea on exertion, that has been somewhat limiting to daily activities. He does not use supplemental oxygen.   Intermittent wheezing noted off and on through the day. No specified aggravating factors noted other than cigarette smoke exposure.  He states that he received secondhand exposure by his parents approximately 30 years ago.  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        Review of Systems   Constitutional: Negative for activity change, fatigue and unexpected weight change.   HENT: Negative for congestion, postnasal drip and rhinorrhea.    Respiratory: Positive for cough and shortness of breath. Negative for apnea, chest tightness and wheezing.    Cardiovascular: Negative for chest pain and palpitations.   Gastrointestinal: Negative for nausea.   Allergic/Immunologic: Negative for environmental allergies.   Psychiatric/Behavioral: Negative for agitation and confusion.       Active Problems:  Problem List Items Addressed This Visit     None     "      Past Medical History:  Past Medical History:   Diagnosis Date   • Headache    • Low back pain    • Sleep apnea        Family History:  Family History   Problem Relation Age of Onset   • Heart disease Mother    • Arthritis Mother    • Diabetes Mother    • Heart disease Father    • Arthritis Father        Social History:  Social History     Tobacco Use   • Smoking status: Never Smoker   • Smokeless tobacco: Never Used   Substance Use Topics   • Alcohol use: No       Current Medications:  Current Outpatient Medications   Medication Sig Dispense Refill   • celecoxib (CeleBREX) 200 MG capsule Take 1 capsule by mouth 2 (Two) Times a Day. 60 capsule 0   • esomeprazole (NexIUM) 40 MG capsule Take 40 mg by mouth daily.     • FLUoxetine (PROzac) 10 MG capsule Take 10 mg by mouth Daily.     • gabapentin (NEURONTIN) 300 MG capsule Take 300 mg by mouth 4 (Four) Times a Day.     • HYDROcodone-acetaminophen (NORCO)  MG per tablet Take 1 tablet by mouth Every 6 (Six) Hours As Needed for Moderate Pain .     • hydrOXYzine (ATARAX) 25 MG tablet Take 25 mg by mouth 2 (two) times a day.     • methocarbamol (ROBAXIN) 750 MG tablet Take 750 mg by mouth 4 (Four) Times a Day As Needed for Muscle Spasms.       No current facility-administered medications for this visit.        Allergies:  No Known Allergies    Vitals:  /72   Pulse 89   Temp 98.1 °F (36.7 °C) (Oral)   Ht 177.8 cm (70\")   Wt 85.3 kg (188 lb)   SpO2 98%   BMI 26.98 kg/m²     Imaging:    Imaging Results (Most Recent)     None          Pulmonary Functions Testing Results:    No results found for: FEV1, FVC, TAN8OQW, TLC, DLCO    Results for orders placed or performed during the hospital encounter of 10/08/19   Adult Transthoracic Echo Complete W/ Cont if Necessary Per Protocol   Result Value Ref Range    BSA 2.0 m^2    IVSd 1.3 cm    IVSs 0.93 cm    LVIDd 4.6 cm    LVIDs 2.9 cm    LVPWd 1.0 cm    BH CV ECHO WALLACE - LVPWS 1.2 cm    IVS/LVPW 1.3     FS 37.2 % "    EDV(Teich) 98.6 ml    ESV(Teich) 32.3 ml    EF(Teich) 67.2 %    EDV(cubed) 99.0 ml    ESV(cubed) 24.5 ml    EF(cubed) 75.3 %    % IVS thick -27.8 %    % LVPW thick 15.6 %    LV mass(C)d 196.0 grams    LV mass(C)dI 97.7 grams/m^2    LV mass(C)s 85.3 grams    LV mass(C)sI 42.5 grams/m^2    SV(Teich) 66.3 ml    SI(Teich) 33.1 ml/m^2    SV(cubed) 74.5 ml    SI(cubed) 37.1 ml/m^2    Ao root diam 3.1 cm    Ao root area 7.4 cm^2    ACS 2.1 cm    LA dimension 3.4 cm    LA/Ao 1.1     LVOT diam 1.9 cm    LVOT area 2.8 cm^2    LVOT area(traced) 2.8 cm^2    LVLd ap4 5.4 cm    EDV(MOD-sp4) 52.0 ml    LVLs ap4 4.7 cm    ESV(MOD-sp4) 14.0 ml    EF(MOD-sp4) 73.1 %    SV(MOD-sp4) 38.0 ml    SI(MOD-sp4) 18.9 ml/m^2    Ao root area (BSA corrected) 1.5     LV Frias Vol (BSA corrected) 25.9 ml/m^2    LV Sys Vol (BSA corrected) 7.0 ml/m^2    MV E max benjamin 79.0 cm/sec    MV A max benjamin 61.7 cm/sec    MV E/A 1.3     Ao pk benjamin 126.2 cm/sec    Ao max PG 6.4 mmHg    Ao V2 mean 77.3 cm/sec    Ao mean PG 3.0 mmHg    Ao V2 VTI 27.7 cm    SV(Ao) 206.2 ml    SI(Ao) 102.8 ml/m^2    PA acc slope 705.4 cm/sec^2    PA acc time 0.17 sec    TR max benjamin 264.7 cm/sec    RVSP(TR) 38.0 mmHg    RAP systole 10.0 mmHg    PA pr(Accel) 4.5 mmHg     CV ECHO WALLACE - BZI_BMI 26.1 kilograms/m^2    BH CV ECHO [facility] - BSA([facility]) 2.0 m^2     CV ECHO WALLACE - BZI_METRIC_WEIGHT 82.6 kg     CV ECHO WALLACE - BZI_METRIC_HEIGHT 177.8 cm    Target HR (85%) 145 bpm    Max. Pred. HR (100%) 170 bpm       Objective   Physical Exam   Constitutional: He is oriented to person, place, and time. He appears well-developed and well-nourished. No distress.   HENT:   Head: Normocephalic and atraumatic.   Nose: Nose normal.   Mouth/Throat: No oropharyngeal exudate.   Eyes: EOM are normal. Pupils are equal, round, and reactive to light.   Cardiovascular: Normal rate, regular rhythm, S1 normal and S2 normal.   Pulmonary/Chest: Effort normal.   Bilateral air entry positive and appreciated  throughout.  Diminished breath sounds at the bases.  No wheezing, rhonchi, or crackles.   Musculoskeletal: Normal range of motion. He exhibits no edema.   Neurological: He is alert and oriented to person, place, and time.   Skin: Skin is warm and dry. He is not diaphoretic.   Psychiatric: He has a normal mood and affect. His behavior is normal.   Vitals reviewed.         Assessment/Plan      I have reviewed the past medical history, family history, social history, surgical history, and allergies.     I reviewed the pulmonary function test from 10/1/2019.  Notable for no obstruction.  Bronchodilator response noted at +10.  Lung volumes showed severe restriction.  DLCO was mildly reduced.    Sleep study completed on 10/7/2019 showed mild sleep apnea.    Reviewed the CT report and imaging from 10/30/2019.  No fibrosis or distributed groundglass noted.  No centrilobular nodules or perilymphatic nodules.  No pleural effusion.  No pericardial effusion.  No other significant masses identified.  Upper abdomen was unremarkable.    Reviewed the echo from 10/8/2019.  Notable for EF of 66 to 70%.  Left ventricular diastolic function was normal.  Trace mitral valve regurgitation present.  Trace to mild tricuspid valve regurgitation present.  Right ventricular systolic pressure was mildly elevated.          ICD-10-CM ICD-9-CM   1. Obstructive sleep apnea G47.33 327.23   2. Fatigue, unspecified type R53.83 780.79   3. Dyspnea on exertion R06.09 786.09   4. Persistent dry cough R05 786.2        Obstructive sleep apnea, fatigue:   · Continue on Autopap. Patient is currently compliant with and adjusting to use.   · Ordered TSH, Free T3, Free T4.     Management obstructive sleep apnea also includes lifestyle modifications including weight loss, avoidance of alcohol, sedated, caffeine especially before bedtime, allowing adequate sleep time, and body position during sleep such as side versus back. 10% weight loss can result in a 50%  improvement of the apnea-hypopnea index.  Untreated sleep apnea can lead to cardiovascular/metabolic disorder, neurocognitive deficit, daytime sleepiness, motor vehicle accidents, depression, mood disorders and reduced quality of life.  Patient understands the risk of untreated obstructive sleep apnea and benefit benefits of the treatment. Recommended at least 4 hours of use per night for 70% of every month (approximately 21 out of 30 days) per CMS guidelines.       Dyspnea on exertion, persistent cough:   · Reviewed the PFT.  Only notable for severe restriction with mildly reduced DLCO.  There is concern for asthmatic bronchitis as a bronchodilator response was noted at +10  · Reviewed the CT imaging, which showed no significant findings suggestive of COPD or interstitial fibrosis no nodules or effusions noted.  · BMI is noted today at 27.0.  · Noted on trial of albuterol inhaler as needed with 2 puffs every 4 hours for increased shortness of breath, intermittent wheezing.  · Started on Singulair 10 mg tablet nightly  · Ordered Mucinex tablets for as needed use with chest congestion  · Discussed inhaler technique.  · alpha 1 antitrypsin genotype was normal  · Awaiting walk test as saturation was noted at 97% on room air  · Ordered CBC, proBNP to assess for non-pulmonary source of dyspnea.   · Cervical spine stenosis noted without other significant kyphosis that may cause restriction      We will see him back in 1 month to assess if for improvement with inhaler and determine if long-acting inhaler therapy should be started.    - Inhaler technique discussion:  I have extensively discussed the steps.  In summary, the steps were discussed in the following order. Patient was advised to rinse the mouth after steroid inhalation to prevent fungal mucositis.  · Remove the cap from the inhaler and shake well.    · Breathe out all the way.    · Place the mouthpiece of the inhaler between your teeth and seal your lips tightly  around it.    · As you start to blow in slowly, press down on the canister one time.   · Keep breathing in as slowly and deeply as you can.    · It should take about 5 seconds for you to completely breathe in.    · Hold your breath for 10 seconds(count to 10 slowly) to allow the medication to reach the airways of the lung.    · Repeat the above steps for each puff.    · Wait about 1 minute between the puffs.    · Replaced the cap on the inhaler when finished.        Vaccinations: Up-to-date on influenza vaccination.     Patient's Body mass index is 26.98 kg/m². BMI is above normal parameters. Recommendations include: exercise counseling.      Return in about 5 weeks (around 12/30/2019), or as needed.

## 2019-12-03 DIAGNOSIS — M48.02 SPINAL STENOSIS IN CERVICAL REGION: Primary | ICD-10-CM

## 2019-12-03 RX ORDER — CELECOXIB 200 MG/1
200 CAPSULE ORAL 2 TIMES DAILY
Qty: 60 CAPSULE | Refills: 1 | Status: SHIPPED | OUTPATIENT
Start: 2019-12-03 | End: 2020-01-31 | Stop reason: SDUPTHER

## 2019-12-03 NOTE — TELEPHONE ENCOUNTER
Provider:  River  Caller: pt  Time of call:   4:13  Phone #:  [phone]  Surgery:  no  Surgery Date:    Last visit: 10/29/19    Next visit: None    ALYSE:         Reason for call:    Pt requests refill on Celebrex.

## 2019-12-12 ENCOUNTER — LAB (OUTPATIENT)
Dept: LAB | Facility: HOSPITAL | Age: 49
End: 2019-12-12

## 2019-12-12 ENCOUNTER — APPOINTMENT (OUTPATIENT)
Dept: LAB | Facility: HOSPITAL | Age: 49
End: 2019-12-12

## 2019-12-12 DIAGNOSIS — R06.09 DYSPNEA ON EXERTION: ICD-10-CM

## 2019-12-12 LAB
BASOPHILS # BLD AUTO: 0.07 10*3/MM3 (ref 0–0.2)
BASOPHILS NFR BLD AUTO: 1.7 % (ref 0–1.5)
DEPRECATED RDW RBC AUTO: 42.2 FL (ref 37–54)
EOSINOPHIL # BLD AUTO: 0.3 10*3/MM3 (ref 0–0.4)
EOSINOPHIL NFR BLD AUTO: 7.4 % (ref 0.3–6.2)
ERYTHROCYTE [DISTWIDTH] IN BLOOD BY AUTOMATED COUNT: 12.7 % (ref 12.3–15.4)
HCT VFR BLD AUTO: 45.6 % (ref 37.5–51)
HGB BLD-MCNC: 15.8 G/DL (ref 13–17.7)
IMM GRANULOCYTES # BLD AUTO: 0 10*3/MM3 (ref 0–0.05)
IMM GRANULOCYTES NFR BLD AUTO: 0 % (ref 0–0.5)
LYMPHOCYTES # BLD AUTO: 1.44 10*3/MM3 (ref 0.7–3.1)
LYMPHOCYTES NFR BLD AUTO: 35.7 % (ref 19.6–45.3)
MCH RBC QN AUTO: 30.7 PG (ref 26.6–33)
MCHC RBC AUTO-ENTMCNC: 34.6 G/DL (ref 31.5–35.7)
MCV RBC AUTO: 88.5 FL (ref 79–97)
MONOCYTES # BLD AUTO: 0.39 10*3/MM3 (ref 0.1–0.9)
MONOCYTES NFR BLD AUTO: 9.7 % (ref 5–12)
NEUTROPHILS # BLD AUTO: 1.83 10*3/MM3 (ref 1.7–7)
NEUTROPHILS NFR BLD AUTO: 45.5 % (ref 42.7–76)
NRBC BLD AUTO-RTO: 0 /100 WBC (ref 0–0.2)
PLATELET # BLD AUTO: 225 10*3/MM3 (ref 140–450)
PMV BLD AUTO: 9.8 FL (ref 6–12)
RBC # BLD AUTO: 5.15 10*6/MM3 (ref 4.14–5.8)
WBC NRBC COR # BLD: 4.03 10*3/MM3 (ref 3.4–10.8)

## 2019-12-12 PROCEDURE — 36415 COLL VENOUS BLD VENIPUNCTURE: CPT

## 2019-12-12 PROCEDURE — 85025 COMPLETE CBC W/AUTO DIFF WBC: CPT

## 2019-12-19 ENCOUNTER — TELEPHONE (OUTPATIENT)
Dept: PULMONOLOGY | Facility: CLINIC | Age: 49
End: 2019-12-19

## 2019-12-19 DIAGNOSIS — R53.83 FATIGUE, UNSPECIFIED TYPE: Primary | ICD-10-CM

## 2019-12-19 DIAGNOSIS — R06.09 DYSPNEA ON EXERTION: ICD-10-CM

## 2019-12-19 NOTE — TELEPHONE ENCOUNTER
Called patient concerning CBC.   I left a message requesting him to call back.     CBC did not show an elevated absolute eosinophil count, but did show an elevated percentage. I would like to repeat the CBC anytime as it has been 1 week since the previous exam to follow the trends of the eosinophil count.   Ordered CBC with differential today.

## 2019-12-20 ENCOUNTER — TELEPHONE (OUTPATIENT)
Dept: PULMONOLOGY | Facility: CLINIC | Age: 49
End: 2019-12-20

## 2019-12-20 NOTE — TELEPHONE ENCOUNTER
Called both phone numbers listed. Left a message at the cell number stating that he could call back to the hospital office phone (number was provided), office phone, and I would try to contact him again at another time.     CBC did not show an elevated absolute eosinophil count, but did show an elevated percentage. I would like to repeat the CBC anytime to follow the trends of the eosinophil count.   Ordered CBC with differential on 12/19/2019.

## 2019-12-27 DIAGNOSIS — R05.3 PERSISTENT DRY COUGH: ICD-10-CM

## 2019-12-27 DIAGNOSIS — J98.4 RESTRICTIVE LUNG DISEASE: ICD-10-CM

## 2019-12-27 DIAGNOSIS — R06.02 SHORTNESS OF BREATH: ICD-10-CM

## 2019-12-27 DIAGNOSIS — G47.33 OBSTRUCTIVE SLEEP APNEA: Primary | ICD-10-CM

## 2019-12-27 RX ORDER — MONTELUKAST SODIUM 10 MG/1
10 TABLET ORAL NIGHTLY
Qty: 90 TABLET | Refills: 3 | Status: SHIPPED | OUTPATIENT
Start: 2019-12-27 | End: 2020-01-08 | Stop reason: SDUPTHER

## 2020-01-08 ENCOUNTER — OFFICE VISIT (OUTPATIENT)
Dept: PULMONOLOGY | Facility: CLINIC | Age: 50
End: 2020-01-08

## 2020-01-08 VITALS
BODY MASS INDEX: 26.66 KG/M2 | OXYGEN SATURATION: 98 % | HEART RATE: 88 BPM | SYSTOLIC BLOOD PRESSURE: 120 MMHG | HEIGHT: 69 IN | DIASTOLIC BLOOD PRESSURE: 80 MMHG | WEIGHT: 180 LBS | TEMPERATURE: 98 F

## 2020-01-08 DIAGNOSIS — G47.33 OBSTRUCTIVE SLEEP APNEA: ICD-10-CM

## 2020-01-08 DIAGNOSIS — R05.3 PERSISTENT DRY COUGH: ICD-10-CM

## 2020-01-08 DIAGNOSIS — R06.02 SHORTNESS OF BREATH: ICD-10-CM

## 2020-01-08 DIAGNOSIS — J98.4 RESTRICTIVE LUNG DISEASE: ICD-10-CM

## 2020-01-08 DIAGNOSIS — J45.40 MODERATE PERSISTENT ASTHMATIC BRONCHITIS WITHOUT COMPLICATION: Primary | ICD-10-CM

## 2020-01-08 PROCEDURE — 99214 OFFICE O/P EST MOD 30 MIN: CPT | Performed by: PHYSICIAN ASSISTANT

## 2020-01-08 PROCEDURE — 94664 DEMO&/EVAL PT USE INHALER: CPT | Performed by: PHYSICIAN ASSISTANT

## 2020-01-08 RX ORDER — MONTELUKAST SODIUM 10 MG/1
10 TABLET ORAL NIGHTLY
Qty: 90 TABLET | Refills: 6 | Status: SHIPPED | OUTPATIENT
Start: 2020-01-08 | End: 2021-01-18 | Stop reason: SDUPTHER

## 2020-01-08 NOTE — PROGRESS NOTES
Have you had the Influenza Vaccine? yes    Would you like to receive this Vaccine today? no    Have you had the Pneumonia Vaccine?  yes   Would you like to receive this Vaccine today? no    Are you a current smoker? no  How much? n/a  Quit date? n/a    Subjective    Reji Linares presents for the following Sleep Apnea      History of Present Illness     Patient presents today for follow up of sleep apnea, shortness of breath.   He remains compliant with his autopap machine without current complications.   Since the previous appointment, he continues to note dyspnea on mild-moderate exertion that has impacted tolerance of daily activities before having to rest. He reports not noticing a significant improvement of symptoms with albuterol use. Shortness of breath is also worsened by exposure to cool temperatures. He noted frequent, dry cough. No chest  congestion. No recent fever, chills, hemoptysis. No weight loss.    He reports previous exposure to rock dust, asbestos, fumes and saw dust. No previous smoking history.   Secondhand smoke exposure received from his parents approximately 30 years ago.     Review of Systems   Constitutional: Negative for chills and fatigue.   HENT: Negative for congestion and rhinorrhea.    Respiratory: Positive for cough and shortness of breath. Negative for wheezing.    Cardiovascular: Negative for chest pain and palpitations.   Neurological: Negative for dizziness, light-headedness and headaches.       Active Problems:  Problem List Items Addressed This Visit     None          Past Medical History:  Past Medical History:   Diagnosis Date   • Headache    • Low back pain    • Sleep apnea        Family History:  Family History   Problem Relation Age of Onset   • Heart disease Mother    • Arthritis Mother    • Diabetes Mother    • Heart disease Father    • Arthritis Father        Social History:  Social History     Tobacco Use   • Smoking status: Never Smoker   • Smokeless tobacco:  "Never Used   Substance Use Topics   • Alcohol use: No       Current Medications:  Current Outpatient Medications   Medication Sig Dispense Refill   • albuterol sulfate HFA (VENTOLIN HFA) 108 (90 Base) MCG/ACT inhaler Inhale 2 puffs Every 4 (Four) Hours As Needed for Wheezing or Shortness of Air. 1 inhaler 8   • celecoxib (CeleBREX) 200 MG capsule Take 1 capsule by mouth 2 (Two) Times a Day. 60 capsule 1   • esomeprazole (NexIUM) 40 MG capsule Take 40 mg by mouth daily.     • FLUoxetine (PROzac) 10 MG capsule Take 10 mg by mouth Daily.     • gabapentin (NEURONTIN) 300 MG capsule Take 300 mg by mouth 4 (Four) Times a Day.     • HYDROcodone-acetaminophen (NORCO)  MG per tablet Take 1 tablet by mouth Every 6 (Six) Hours As Needed for Moderate Pain .     • hydrOXYzine (ATARAX) 25 MG tablet Take 25 mg by mouth 2 (two) times a day.     • methocarbamol (ROBAXIN) 750 MG tablet Take 750 mg by mouth 4 (Four) Times a Day As Needed for Muscle Spasms.     • montelukast (SINGULAIR) 10 MG tablet Take 1 tablet by mouth Every Night. 90 tablet 3     No current facility-administered medications for this visit.        Allergies:  No Known Allergies    Vitals:  /80   Pulse 88   Temp 98 °F (36.7 °C)   Ht 175.3 cm (69\")   Wt 81.6 kg (180 lb)   SpO2 98%   BMI 26.58 kg/m²     Imaging:    Imaging Results (Most Recent)     None          Pulmonary Functions Testing Results:    No results found for: FEV1, FVC, [id number], TLC, DLCO    Results for orders placed or performed in visit on 12/12/19   CBC Auto Differential   Result Value Ref Range    WBC 4.03 3.40 - 10.80 10*3/mm3    RBC 5.15 4.14 - 5.80 10*6/mm3    Hemoglobin 15.8 13.0 - 17.7 g/dL    Hematocrit 45.6 37.5 - 51.0 %    MCV 88.5 79.0 - 97.0 fL    MCH 30.7 26.6 - 33.0 pg    MCHC 34.6 31.5 - 35.7 g/dL    RDW 12.7 12.3 - 15.4 %    RDW-SD 42.2 37.0 - 54.0 fl    MPV 9.8 6.0 - 12.0 fL    Platelets 225 140 - 450 10*3/mm3    Neutrophil % 45.5 42.7 - 76.0 %    Lymphocyte % 35.7 " 19.6 - 45.3 %    Monocyte % 9.7 5.0 - 12.0 %    Eosinophil % 7.4 (H) 0.3 - 6.2 %    Basophil % 1.7 (H) 0.0 - 1.5 %    Immature Grans % 0.0 0.0 - 0.5 %    Neutrophils, Absolute 1.83 1.70 - 7.00 10*3/mm3    Lymphocytes, Absolute 1.44 0.70 - 3.10 10*3/mm3    Monocytes, Absolute 0.39 0.10 - 0.90 10*3/mm3    Eosinophils, Absolute 0.30 0.00 - 0.40 10*3/mm3    Basophils, Absolute 0.07 0.00 - 0.20 10*3/mm3    Immature Grans, Absolute 0.00 0.00 - 0.05 10*3/mm3    nRBC 0.0 0.0 - 0.2 /100 WBC       Objective   Physical Exam   Constitutional: He is oriented to person, place, and time. He appears well-developed and well-nourished. No distress.   HENT:   Head: Normocephalic and atraumatic.   Nose: Nose normal.   Eyes: Pupils are equal, round, and reactive to light. EOM are normal.   Neck: Normal range of motion. Neck supple.   Cardiovascular: Normal rate, regular rhythm, S1 normal, S2 normal and normal heart sounds.   No murmur heard.  Pulmonary/Chest: Effort normal.   Bilateral air entry positive and appreciated throughout.  No wheezing, rhonchi, or crackles.   Musculoskeletal: Normal range of motion. He exhibits no edema.   Neurological: He is alert and oriented to person, place, and time.   Skin: Skin is warm and dry. He is not diaphoretic.   Psychiatric: He has a normal mood and affect. His behavior is normal.   Vitals reviewed.      Assessment/Plan     I have reviewed the past medical history, family history, social history, surgical history, and allergies.     Reviewed the PFT from 10/1/2019. Showed no obstruction. Bronchodilator response of +10 noted. Lung volumes showed severe restriction. DLCO mildly reduced.     Sleep apnea study on 10/7/2019 showed mild sleep apnea.     Reviewed CT imaging, report from 10/30/2019. Showed no fibrosis, ground glass appearance. No centrilobular nodules or perilymphatic nodules. No effusions. No masses.     Reviewed the echo from 10/8/2019. EF of 66-70%. LVDF normal. Trace mitral  regurgitation present. Trace to mild tricuspid valve regurgitation present. Right ventricular systolic pressure mildly elevated.    Alpha 1 antitrypsin genotype normal.     CBC on 12/12/2019 showed elevated % eosinophil and basophils.         ICD-10-CM ICD-9-CM   1. Moderate persistent asthmatic bronchitis without complication J45.40 493.90   2. Persistent dry cough R05 786.2   3. Shortness of breath R06.02 786.05   4. Restrictive lung disease J98.4 518.89   5. Obstructive sleep apnea G47.33 327.23       Asthmatic bronchitis, shortness of breath, persistent cough, restrictive lung disease:  · Alpha 1 genotype normal   · Reviewed the PFT. Concern for asthmatic bronchitis with bronchodilator improvement at a borderline level of +10.   · Reviewed CT. No fibrosis, nodules or COPD changes reported.   · Started on singulair 10 mg tablet nightly  · Started on a trial of Spiriva respimat 1.25 mcg for twice daily use.   · Reviewed inhaler technique, sample was provided.   · Continue albuterol inhaler as needed  · Ordered PFT.   Concern for false restriction noted as CT was non-significant  · Discussed CBC. Showed elevated eosinophil % but not absolute level elevation. Will repeat CBC (previouly ordered) for monitoring.       Obstructive sleep apnea:   · Continue with autopap use. Currently compliant with use.   · Thyroid testing not completed.     Management obstructive sleep apnea also includes lifestyle modifications including weight loss, avoidance of alcohol, sedated, caffeine especially before bedtime, allowing adequate sleep time, and body position during sleep such as side versus back. 10% weight loss can result in a 50% improvement of the apnea-hypopnea index.  Untreated sleep apnea can lead to cardiovascular/metabolic disorder, neurocognitive deficit, daytime sleepiness, motor vehicle accidents, depression, mood disorders and reduced quality of life.  Patient understands the risk of untreated obstructive sleep apnea  and benefit benefits of the treatment. Recommended at least 4 hours of use per night for 70% of every month (approximately 21 out of 30 days) per CMS guidelines.       Vaccinations: up to date on influenza vaccination.     Patient's Body mass index is 26.58 kg/m². BMI is within normal parameters. No follow-up required..      - Inhaler technique demonstration/discussion:  I have extensively discussed the steps.  In summary, the steps were discussed in the following order. Patient was advised to rinse the mouth after steroid inhalation to prevent fungal mucositis.  · Remove the cap from the inhaler and shake well.    · Breathe out all the way.    · Place the mouthpiece of the inhaler between your teeth and seal your lips tightly around it.    · As you start to blow in slowly, press down on the canister one time.   · Keep breathing in as slowly and deeply as you can.    · It should take about 5 seconds for you to completely breathe in.    · Hold your breath for 10 seconds(count to 10 slowly) to allow the medication to reach the airways of the lung.    · Repeat the above steps for each puff.    · Wait about 1 minute between the puffs.    · Replaced the cap on the inhaler when finished.      Return in about 2 months (around 3/8/2020), or as needed.

## 2020-01-31 DIAGNOSIS — M48.02 SPINAL STENOSIS IN CERVICAL REGION: ICD-10-CM

## 2020-01-31 RX ORDER — CELECOXIB 200 MG/1
200 CAPSULE ORAL 2 TIMES DAILY
Qty: 60 CAPSULE | Refills: 1 | Status: SHIPPED | OUTPATIENT
Start: 2020-01-31 | End: 2020-04-07 | Stop reason: SDUPTHER

## 2020-01-31 NOTE — TELEPHONE ENCOUNTER
Provider:  River  Caller:  Pt   Surgery:  NA  Surgery Date:    Last visit:  10/29/19  Next visit: NA    Reason for call:         Requested Prescriptions     Pending Prescriptions Disp Refills   • celecoxib (CeleBREX) 200 MG capsule 60 capsule 1     Sig: Take 1 capsule by mouth 2 (Two) Times a Day.

## 2020-04-07 DIAGNOSIS — M48.02 SPINAL STENOSIS IN CERVICAL REGION: ICD-10-CM

## 2020-04-07 RX ORDER — CELECOXIB 200 MG/1
200 CAPSULE ORAL 2 TIMES DAILY
Qty: 60 CAPSULE | Refills: 1 | Status: SHIPPED | OUTPATIENT
Start: 2020-04-07 | End: 2020-08-20 | Stop reason: SDUPTHER

## 2020-04-07 NOTE — TELEPHONE ENCOUNTER
Provider:  River  Caller: patient  Time of call:   9:35  Phone #:  [phone]  Surgery:  no  Surgery Date:    Last visit:  10/29/19   Next visit: None    ALYSE:         Reason for call:    RF Celebrex.

## 2020-04-08 DIAGNOSIS — M48.02 SPINAL STENOSIS IN CERVICAL REGION: ICD-10-CM

## 2020-04-08 RX ORDER — CELECOXIB 200 MG/1
CAPSULE ORAL
Qty: 60 CAPSULE | Refills: 0 | OUTPATIENT
Start: 2020-04-08

## 2020-06-09 ENCOUNTER — OFFICE VISIT (OUTPATIENT)
Dept: PULMONOLOGY | Facility: CLINIC | Age: 50
End: 2020-06-09

## 2020-06-09 VITALS
HEIGHT: 70 IN | BODY MASS INDEX: 25.77 KG/M2 | WEIGHT: 180 LBS | HEART RATE: 78 BPM | DIASTOLIC BLOOD PRESSURE: 76 MMHG | TEMPERATURE: 99.1 F | SYSTOLIC BLOOD PRESSURE: 126 MMHG | OXYGEN SATURATION: 97 %

## 2020-06-09 DIAGNOSIS — R06.09 DYSPNEA ON EXERTION: Primary | ICD-10-CM

## 2020-06-09 DIAGNOSIS — J45.20 MILD INTERMITTENT ASTHMA WITHOUT COMPLICATION: ICD-10-CM

## 2020-06-09 DIAGNOSIS — G47.33 OBSTRUCTIVE SLEEP APNEA: ICD-10-CM

## 2020-06-09 DIAGNOSIS — R05.3 PERSISTENT DRY COUGH: ICD-10-CM

## 2020-06-09 PROCEDURE — 99214 OFFICE O/P EST MOD 30 MIN: CPT | Performed by: PHYSICIAN ASSISTANT

## 2020-06-09 NOTE — PROGRESS NOTES
Interval history since last visit: persistent nighttime cough, no improvement with Spiriva respimat.     Recent hospitalizations:  none    Investigations (imaging, PFT's, labs, sleep study, record requests, etc.)    Have you had the Influenza Vaccine? yes    Would you like to receive this Vaccine today? no    Have you had the Pneumonia Vaccine?  no  Would you like to receive this Vaccine today? no    Subjective    Reji Linares presents for the following Sleep Apnea and Bronchitis      History of Present Illness     Patient presents for follow up today of moderate persistent asthmatic bronchitis, obstructive sleep apnea.   He attempted a trial of spiriva respimat at the last visit, but did not notice symptomatic improvement with use. Symptoms include persistent cough, typically noted at nighttime. Occasionally wheezing also noted. These in turn cause intermittent shortness of breath, but symptoms seem most impaired by the coughing at this time. No overall recent worsening of symptoms since the last visit, only persistence. No recent fever, chills.   He continues to have compliance with the autopap machine. No acute issues reported at this time as he has adjusted to use overtime.         Review of Systems   Constitutional: Negative for chills and fever.   HENT: Negative for congestion and postnasal drip.    Respiratory: Positive for cough (nighttime). Negative for shortness of breath and wheezing.    Cardiovascular: Negative for chest pain and palpitations.   Neurological: Negative for dizziness and light-headedness.       Active Problems:  Problem List Items Addressed This Visit     None          Past Medical History:  Past Medical History:   Diagnosis Date   • Headache    • Low back pain    • Sleep apnea        Family History:  Family History   Problem Relation Age of Onset   • Heart disease Mother    • Arthritis Mother    • Diabetes Mother    • Heart disease Father    • Arthritis Father        Social  "History:  Social History     Tobacco Use   • Smoking status: Never Smoker   • Smokeless tobacco: Never Used   Substance Use Topics   • Alcohol use: No       Current Medications:  Current Outpatient Medications   Medication Sig Dispense Refill   • albuterol sulfate HFA (VENTOLIN HFA) 108 (90 Base) MCG/ACT inhaler Inhale 2 puffs Every 4 (Four) Hours As Needed for Wheezing or Shortness of Air. 1 inhaler 8   • celecoxib (CeleBREX) 200 MG capsule Take 1 capsule by mouth 2 (Two) Times a Day. 60 capsule 1   • esomeprazole (NexIUM) 40 MG capsule Take 40 mg by mouth daily.     • FLUoxetine (PROzac) 10 MG capsule Take 10 mg by mouth Daily.     • gabapentin (NEURONTIN) 300 MG capsule Take 300 mg by mouth 4 (Four) Times a Day.     • HYDROcodone-acetaminophen (NORCO)  MG per tablet Take 1 tablet by mouth Every 6 (Six) Hours As Needed for Moderate Pain .     • hydrOXYzine (ATARAX) 25 MG tablet Take 25 mg by mouth 2 (two) times a day.     • methocarbamol (ROBAXIN) 750 MG tablet Take 750 mg by mouth 4 (Four) Times a Day As Needed for Muscle Spasms.     • montelukast (SINGULAIR) 10 MG tablet Take 1 tablet by mouth Every Night. 90 tablet 6   • Tiotropium Bromide Monohydrate (SPIRIVA RESPIMAT) 1.25 MCG/ACT aerosol solution inhaler Inhale 2 puffs Daily. 1 inhaler 8     No current facility-administered medications for this visit.        Allergies:  No Known Allergies    Vitals:  /76   Pulse 78   Temp 99.1 °F (37.3 °C) (Temporal)   Ht 177.8 cm (70\")   Wt 81.6 kg (180 lb)   SpO2 97%   BMI 25.83 kg/m²     Imaging:    Imaging Results (Most Recent)     None          Pulmonary Functions Testing Results:    No results found for: FEV1, FVC, TAG4VOL, TLC, DLCO    Results for orders placed or performed in visit on 12/12/19   CBC Auto Differential   Result Value Ref Range    WBC 4.03 3.40 - 10.80 10*3/mm3    RBC 5.15 4.14 - 5.80 10*6/mm3    Hemoglobin 15.8 13.0 - 17.7 g/dL    Hematocrit 45.6 37.5 - 51.0 %    MCV 88.5 79.0 - 97.0 " fL    MCH 30.7 26.6 - 33.0 pg    MCHC 34.6 31.5 - 35.7 g/dL    RDW 12.7 12.3 - 15.4 %    RDW-SD 42.2 37.0 - 54.0 fl    MPV 9.8 6.0 - 12.0 fL    Platelets 225 140 - 450 10*3/mm3    Neutrophil % 45.5 42.7 - 76.0 %    Lymphocyte % 35.7 19.6 - 45.3 %    Monocyte % 9.7 5.0 - 12.0 %    Eosinophil % 7.4 (H) 0.3 - 6.2 %    Basophil % 1.7 (H) 0.0 - 1.5 %    Immature Grans % 0.0 0.0 - 0.5 %    Neutrophils, Absolute 1.83 1.70 - 7.00 10*3/mm3    Lymphocytes, Absolute 1.44 0.70 - 3.10 10*3/mm3    Monocytes, Absolute 0.39 0.10 - 0.90 10*3/mm3    Eosinophils, Absolute 0.30 0.00 - 0.40 10*3/mm3    Basophils, Absolute 0.07 0.00 - 0.20 10*3/mm3    Immature Grans, Absolute 0.00 0.00 - 0.05 10*3/mm3    nRBC 0.0 0.0 - 0.2 /100 WBC       Objective   Physical Exam   Constitutional: He is oriented to person, place, and time. He appears well-developed and well-nourished.   HENT:   Head: Normocephalic and atraumatic.   Eyes: Pupils are equal, round, and reactive to light. EOM are normal.   Cardiovascular: Normal rate, regular rhythm and normal heart sounds.   No murmur heard.  Pulmonary/Chest: Effort normal.   Bilateral air entry positive, equal bilaterally. No wheezing, rhonchi, crackles.    Musculoskeletal: He exhibits no edema.   Neurological: He is alert and oriented to person, place, and time.   Psychiatric: He has a normal mood and affect. His behavior is normal.       Assessment/Plan      I have reviewed the past medical history, family history, social history, surgical history, and allergies.     Reviewed the PFT from 10/01/2019. No obstruction. Bronchodilator response note at +10. Lung volumes showed severe restriction. DLCO mildly reduced.     Sleep study previously completed on 10/7/2019 showing mild MAK.     Reviewed the CT imaging, report from 10/30/2019. No fibrosis, groundglass opacities. No nodules. No effusions.    Reviewed the echo from 10/8/2019. EF 66-70%. LVDF normal. Trace mitral regurgitation present. Trace-mild  tricuspid regurgitation present. RV systolic pressure mildly elevated.     Alpha 1 antitrypsin genotype normal.         ICD-10-CM ICD-9-CM   1. Dyspnea on exertion R06.00 786.09   2. Persistent dry cough R05 786.2   3. Mild intermittent asthma without complication J45.20 493.90   4. Obstructive sleep apnea G47.33 327.23          Mild intermittent asthma, persistent dry cough:  · Previous alpha-1 genotype testing was normal.  · On Singulair tablets nightly  · Discontinued Spiriva Respimat 1.25 mcg, no symptomatic change noted with use.   · Continue albuterol inhaler as needed  · Started on Arnuity (fluticasone) 100 mcg once daily. Discussed inhalation technique. Recommended oral rinsing following use to avoid oral candidiasis, irritation.   Concern present for asthmatic bronchitis.   Discussed that dose can be increased or decreased at anytime based on symptom responsiveness.   · Ordered PFT       Obstructive sleep apnea:  Compliant with AutoPap therapy.     Management obstructive sleep apnea also includes lifestyle modifications including weight loss, avoidance of alcohol, sedated, caffeine especially before bedtime, allowing adequate sleep time, and body position during sleep such as side versus back. 10% weight loss can result in a 50% improvement of the apnea-hypopnea index.  Untreated sleep apnea can lead to cardiovascular/metabolic disorder, neurocognitive deficit, daytime sleepiness, motor vehicle accidents, depression, mood disorders and reduced quality of life.  Patient understands the risk of untreated obstructive sleep apnea and benefit benefits of the treatment. Recommended at least 4 hours of use per night for 70% of every month (approximately 21 out of 30 days) per CMS guidelines.           Vaccinations: up to date on influenza vaccination.     Patient's Body mass index is 25.83 kg/m². BMI is within normal parameters. No follow-up required..      Return in about 3 months (around 9/9/2020), or as needed.

## 2020-06-11 RX ORDER — ALBUTEROL SULFATE 90 UG/1
2 AEROSOL, METERED RESPIRATORY (INHALATION) EVERY 4 HOURS PRN
Qty: 3 INHALER | Refills: 8 | Status: SHIPPED | OUTPATIENT
Start: 2020-06-11 | End: 2020-09-09

## 2020-06-14 PROBLEM — J45.20 MILD INTERMITTENT ASTHMA WITHOUT COMPLICATION: Status: ACTIVE | Noted: 2020-06-14

## 2020-06-24 ENCOUNTER — TRANSCRIBE ORDERS (OUTPATIENT)
Dept: ADMINISTRATIVE | Facility: HOSPITAL | Age: 50
End: 2020-06-24

## 2020-06-24 DIAGNOSIS — Z11.59 ENCOUNTER FOR SCREENING FOR OTHER VIRAL DISEASES: Primary | ICD-10-CM

## 2020-06-26 ENCOUNTER — LAB (OUTPATIENT)
Dept: LAB | Facility: HOSPITAL | Age: 50
End: 2020-06-26

## 2020-06-26 DIAGNOSIS — Z11.59 ENCOUNTER FOR SCREENING FOR OTHER VIRAL DISEASES: ICD-10-CM

## 2020-06-26 LAB
REF LAB TEST METHOD: NORMAL
SARS-COV-2 RNA RESP QL NAA+PROBE: NOT DETECTED

## 2020-06-26 PROCEDURE — U0004 COV-19 TEST NON-CDC HGH THRU: HCPCS

## 2020-06-26 PROCEDURE — C9803 HOPD COVID-19 SPEC COLLECT: HCPCS

## 2020-06-26 PROCEDURE — U0002 COVID-19 LAB TEST NON-CDC: HCPCS

## 2020-06-29 ENCOUNTER — HOSPITAL ENCOUNTER (OUTPATIENT)
Dept: RESPIRATORY THERAPY | Facility: HOSPITAL | Age: 50
Discharge: HOME | End: 2020-06-29
Admitting: PHYSICIAN ASSISTANT

## 2020-06-29 VITALS — HEART RATE: 81 BPM | OXYGEN SATURATION: 98 % | RESPIRATION RATE: 15 BRPM

## 2020-06-29 DIAGNOSIS — R06.09 DYSPNEA ON EXERTION: ICD-10-CM

## 2020-06-29 PROCEDURE — 94060 EVALUATION OF WHEEZING: CPT

## 2020-06-29 PROCEDURE — 94799 UNLISTED PULMONARY SVC/PX: CPT

## 2020-06-29 PROCEDURE — 94727 GAS DIL/WSHOT DETER LNG VOL: CPT | Performed by: INTERNAL MEDICINE

## 2020-06-29 PROCEDURE — 94729 DIFFUSING CAPACITY: CPT | Performed by: INTERNAL MEDICINE

## 2020-06-29 PROCEDURE — 94640 AIRWAY INHALATION TREATMENT: CPT

## 2020-06-29 PROCEDURE — 94729 DIFFUSING CAPACITY: CPT

## 2020-06-29 PROCEDURE — 94060 EVALUATION OF WHEEZING: CPT | Performed by: INTERNAL MEDICINE

## 2020-06-29 PROCEDURE — 94727 GAS DIL/WSHOT DETER LNG VOL: CPT

## 2020-06-29 RX ORDER — ALBUTEROL SULFATE 2.5 MG/3ML
2.5 SOLUTION RESPIRATORY (INHALATION) EVERY 4 HOURS PRN
Status: DISCONTINUED | OUTPATIENT
Start: 2020-06-29 | End: 2020-06-30 | Stop reason: HOSPADM

## 2020-06-29 RX ADMIN — ALBUTEROL SULFATE 2.5 MG: 2.5 SOLUTION RESPIRATORY (INHALATION) at 12:21

## 2020-07-13 ENCOUNTER — TELEPHONE (OUTPATIENT)
Dept: PULMONOLOGY | Facility: CLINIC | Age: 50
End: 2020-07-13

## 2020-07-13 DIAGNOSIS — J45.20 MILD INTERMITTENT ASTHMA WITHOUT COMPLICATION: Primary | ICD-10-CM

## 2020-07-17 ENCOUNTER — DOCUMENTATION (OUTPATIENT)
Dept: PULMONOLOGY | Facility: CLINIC | Age: 50
End: 2020-07-17

## 2020-07-17 DIAGNOSIS — B37.0 ORAL CANDIDIASIS: Primary | ICD-10-CM

## 2020-07-17 RX ORDER — DIPHENHYDRAMINE, LIDOCAINE, NYSTATIN
5 KIT ORAL 4 TIMES DAILY
Qty: 200 ML | Refills: 2 | Status: SHIPPED | OUTPATIENT
Start: 2020-07-17 | End: 2020-07-27

## 2020-07-17 NOTE — PROGRESS NOTES
Patient called the office today as he had developed oral candidiasis following start of steroid inhaler. I had asked him to call me in this case as I would prescribe treatment for that.     Ordered nystatin swish & swallow for 10 days with 2 refills.

## 2020-08-20 DIAGNOSIS — M48.02 SPINAL STENOSIS IN CERVICAL REGION: ICD-10-CM

## 2020-08-20 RX ORDER — CELECOXIB 200 MG/1
200 CAPSULE ORAL 2 TIMES DAILY
Qty: 60 CAPSULE | Refills: 1 | Status: SHIPPED | OUTPATIENT
Start: 2020-08-20 | End: 2020-08-20

## 2020-08-20 RX ORDER — CELECOXIB 200 MG/1
200 CAPSULE ORAL 2 TIMES DAILY
Qty: 60 CAPSULE | Refills: 1 | Status: SHIPPED | OUTPATIENT
Start: 2020-08-20 | End: 2020-10-29 | Stop reason: SDUPTHER

## 2020-08-20 NOTE — TELEPHONE ENCOUNTER
Provider:  River  Caller:  Patient  Surgery:  N/A  Surgery Date: N/A    Last visit:  10/29/2019  Next visit: N/A    Reason for call:         Requested Prescriptions     Pending Prescriptions Disp Refills   • celecoxib (CeleBREX) 200 MG capsule 60 capsule 1     Sig: Take 1 capsule by mouth 2 (Two) Times a Day.

## 2020-09-15 ENCOUNTER — OFFICE VISIT (OUTPATIENT)
Dept: PULMONOLOGY | Facility: CLINIC | Age: 50
End: 2020-09-15

## 2020-09-15 VITALS
OXYGEN SATURATION: 96 % | HEIGHT: 71 IN | WEIGHT: 180 LBS | SYSTOLIC BLOOD PRESSURE: 120 MMHG | DIASTOLIC BLOOD PRESSURE: 80 MMHG | HEART RATE: 83 BPM | TEMPERATURE: 98.2 F | BODY MASS INDEX: 25.2 KG/M2

## 2020-09-15 DIAGNOSIS — J45.20 MILD INTERMITTENT ASTHMA WITHOUT COMPLICATION: Primary | ICD-10-CM

## 2020-09-15 DIAGNOSIS — R05.3 PERSISTENT DRY COUGH: ICD-10-CM

## 2020-09-15 DIAGNOSIS — G47.33 OBSTRUCTIVE SLEEP APNEA: ICD-10-CM

## 2020-09-15 PROCEDURE — 99214 OFFICE O/P EST MOD 30 MIN: CPT | Performed by: PHYSICIAN ASSISTANT

## 2020-09-15 RX ORDER — BENZONATATE 100 MG/1
100 CAPSULE ORAL 3 TIMES DAILY PRN
Qty: 42 CAPSULE | Refills: 0 | Status: SHIPPED | OUTPATIENT
Start: 2020-09-15 | End: 2020-10-06 | Stop reason: SDUPTHER

## 2020-09-15 RX ORDER — FLUTICASONE PROPIONATE 220 UG/1
1 AEROSOL, METERED RESPIRATORY (INHALATION)
Qty: 1 INHALER | Refills: 11 | Status: SHIPPED | OUTPATIENT
Start: 2020-09-15 | End: 2020-09-15

## 2020-09-15 RX ORDER — ALBUTEROL SULFATE 90 UG/1
2 AEROSOL, METERED RESPIRATORY (INHALATION) EVERY 4 HOURS PRN
Qty: 18 G | Refills: 8 | Status: SHIPPED | OUTPATIENT
Start: 2020-09-15 | End: 2021-01-18 | Stop reason: SDUPTHER

## 2020-09-15 RX ORDER — FLUTICASONE PROPIONATE 220 UG/1
1 AEROSOL, METERED RESPIRATORY (INHALATION)
Qty: 1 INHALER | Refills: 11 | Status: SHIPPED | OUTPATIENT
Start: 2020-09-15 | End: 2021-01-18 | Stop reason: SDUPTHER

## 2020-09-15 RX ORDER — ALBUTEROL SULFATE 90 UG/1
2 AEROSOL, METERED RESPIRATORY (INHALATION) EVERY 4 HOURS PRN
Qty: 18 G | Refills: 8 | Status: SHIPPED | OUTPATIENT
Start: 2020-09-15 | End: 2020-09-15

## 2020-09-15 NOTE — PROGRESS NOTES
Have you had the Influenza Vaccine? yes    Would you like to receive this Vaccine today? no    Have you had the Pneumonia Vaccine?  yes   Would you like to receive this Vaccine today? no    Are you a current smoker? no  How much? n/a  Quit date? n/a    Subjective    Reji Linares presents for the following Shortness of Breath      History of Present Illness     Patient presents today for follow-up of mild intermittent asthma, obstructive sleep apnea.   He reports that he is still noticing occasional smothering sensations.  Cough is intermittently noted, more persistent at nighttime.  No significant phlegm production noted.  No hemoptysis, fever or chills.  He was tried on medium dose Arnuity inhaler at the last visit, but has not noticed a significant symptomatic improvement with use. He did develop oral thrush, but resolved with treatment by Nystatin swish and swallow.   He previously did not find any improvement of symptoms with use of Spiriva respimat 1.25 mcg. PFT recently signaled an underlying asthma by notable bronchodilator effect.   He remains compliant with autopap use. No previous smoking history.     Review of Systems   Constitutional: Negative for activity change, fatigue and unexpected weight change.   HENT: Negative for congestion, postnasal drip and rhinorrhea.    Respiratory: Positive for cough (nighttime). Negative for chest tightness, shortness of breath and wheezing.    Cardiovascular: Negative for chest pain and palpitations.   Gastrointestinal: Negative for nausea.   Allergic/Immunologic: Negative for environmental allergies.   Psychiatric/Behavioral: Negative for agitation and confusion.       Active Problems:  Problem List Items Addressed This Visit     None          Past Medical History:  Past Medical History:   Diagnosis Date   • Headache    • Low back pain    • Sleep apnea        Family History:  Family History   Problem Relation Age of Onset   • Heart disease Mother    • Arthritis  "Mother    • Diabetes Mother    • Heart disease Father    • Arthritis Father        Social History:  Social History     Tobacco Use   • Smoking status: Never Smoker   • Smokeless tobacco: Never Used   Substance Use Topics   • Alcohol use: No       Current Medications:  Current Outpatient Medications   Medication Sig Dispense Refill   • celecoxib (CeleBREX) 200 MG capsule Take 1 capsule by mouth 2 (Two) Times a Day. 60 capsule 1   • esomeprazole (NexIUM) 40 MG capsule Take 40 mg by mouth daily.     • FLUoxetine (PROzac) 10 MG capsule Take 10 mg by mouth Daily.     • gabapentin (NEURONTIN) 300 MG capsule Take 300 mg by mouth 4 (Four) Times a Day.     • HYDROcodone-acetaminophen (NORCO)  MG per tablet Take 1 tablet by mouth Every 6 (Six) Hours As Needed for Moderate Pain .     • hydrOXYzine (ATARAX) 25 MG tablet Take 25 mg by mouth 2 (two) times a day.     • methocarbamol (ROBAXIN) 750 MG tablet Take 750 mg by mouth 4 (Four) Times a Day As Needed for Muscle Spasms.     • montelukast (SINGULAIR) 10 MG tablet Take 1 tablet by mouth Every Night. 90 tablet 6     No current facility-administered medications for this visit.        Allergies:  No Known Allergies    Vitals:  /80   Pulse 83   Temp 98.2 °F (36.8 °C) (Temporal)   Ht 180.3 cm (71\")   Wt 81.6 kg (180 lb)   SpO2 96%   BMI 25.10 kg/m²     Imaging:    Imaging Results (Most Recent)     None          Pulmonary Functions Testing Results:    No results found for: FEV1, FVC, XFN0QVY, TLC, DLCO    Results for orders placed or performed in visit on 06/26/20   COVID-19,[facility], NP SWAB IN [facility] 24-30 HR TAT - Swab, Nasopharynx    Specimen: Nasopharynx; Swab   Result Value Ref Range    Reference Lab Report performed by [facility]     COVID19 Not Detected Not Detected - Ref. Range       Objective   Physical Exam  Constitutional:       Appearance: Normal appearance. He is not ill-appearing.   HENT:      Head: Normocephalic and atraumatic.   Eyes: "      Pupils: Pupils are equal, round, and reactive to light.   Cardiovascular:      Rate and Rhythm: Normal rate and regular rhythm.      Heart sounds: Normal heart sounds.   Pulmonary:      Effort: Pulmonary effort is normal.      Breath sounds: Normal breath sounds. No wheezing, rhonchi or rales.   Neurological:      Mental Status: He is alert and oriented to person, place, and time.   Psychiatric:         Behavior: Behavior normal.         Assessment/Plan      I have reviewed the past medical history, family history, social history, surgical history, and allergies.     Reviewed the imaging reports CT chest from October 2019.  No fibrosis, infiltrates, nodules.  No air trapping noted.    Reviewed the PFT from June 2020.  Notable for +11 bronchodilator response.  Mild restriction noted with significant air trapping.  Test was overall limited by expiratory phase.    Reviewed the echocardiogram from October 2019.  Notable for EF of 66 to 70%.  Mildly elevated right ventricular systolic pressure.  No other significant valvular normalities.         ICD-10-CM ICD-9-CM   1. Mild intermittent asthma without complication  J45.20 493.90   2. Persistent dry cough  R05 786.2   3. Obstructive sleep apnea  G47.33 327.23          Mild intermittent asthma, persistent dry cough  · Reviewed the recent PFT results.    Notable for no true obstruction.  Significant bronchodilator response noted at +11.  Test was overall limited by expiratory phase.  · Continue albuterol inhaler as needed.   · Started Flovent 220 mcg 2 puffs twice daily  Recommended to continue oral rinsing following use.   Steroid dosage was increased and switched to MDI form to help reduce recurrence of oral thrush noted with Arnuity.   · On singulair tablets nightly.  · Ordered Tessalon capsules for as needed use to see if this also helps to reduce cough, which may be impairing of sleep.    He agreed to start with inhaler only use for at least 2 weeks, then use the  capsules if needed to see if the inhaler had symptomatic improvement.   He conveyed understanding.         Obstructive sleep apnea:   · On autopap therapy nightly    Management obstructive sleep apnea also includes lifestyle modifications including weight loss, avoidance of alcohol, sedated, caffeine especially before bedtime, allowing adequate sleep time, and body position during sleep such as side versus back. 10% weight loss can result in a 50% improvement of the apnea-hypopnea index.  Untreated sleep apnea can lead to cardiovascular/metabolic disorder, neurocognitive deficit, daytime sleepiness, motor vehicle accidents, depression, mood disorders and reduced quality of life.  Patient understands the risk of untreated obstructive sleep apnea and benefit benefits of the treatment. Recommended at least 4 hours of use per night for 70% of every month (approximately 21 out of 30 days) per CMS guidelines.         Vaccinations: influenza vaccination up to date. Recommend updated vaccination once available.   Patient reported pneumonia vaccination up to date, unsure of exact date.     Patient's Body mass index is 25.1 kg/m². BMI is within normal parameters. No follow-up required..      Return in about 3 months (around 12/15/2020), or as needed.

## 2020-10-06 RX ORDER — BENZONATATE 100 MG/1
100 CAPSULE ORAL 3 TIMES DAILY PRN
Qty: 60 CAPSULE | Refills: 2 | Status: SHIPPED | OUTPATIENT
Start: 2020-10-06 | End: 2020-10-26

## 2020-10-29 DIAGNOSIS — M48.02 SPINAL STENOSIS IN CERVICAL REGION: ICD-10-CM

## 2020-10-29 RX ORDER — CELECOXIB 200 MG/1
200 CAPSULE ORAL 2 TIMES DAILY
Qty: 60 CAPSULE | Refills: 1 | Status: SHIPPED | OUTPATIENT
Start: 2020-10-29

## 2020-10-29 NOTE — TELEPHONE ENCOUNTER
Provider:  River  Caller: patient  Time of call:  11:22   Phone #:  [phone]  Surgery:  no  Surgery Date:    Last visit:  10/29/19   Next visit: none    ALYSE:         Reason for call:     Patient requests refill on Celebrex.

## 2020-12-10 ENCOUNTER — TRANSCRIBE ORDERS (OUTPATIENT)
Dept: ADMINISTRATIVE | Facility: HOSPITAL | Age: 50
End: 2020-12-10

## 2020-12-10 DIAGNOSIS — Z01.818 PREOP EXAMINATION: Primary | ICD-10-CM

## 2020-12-14 ENCOUNTER — LAB (OUTPATIENT)
Dept: LAB | Facility: HOSPITAL | Age: 50
End: 2020-12-14

## 2020-12-14 DIAGNOSIS — Z01.818 PREOP EXAMINATION: ICD-10-CM

## 2020-12-14 LAB — SARS-COV-2 RNA RESP QL NAA+PROBE: NOT DETECTED

## 2020-12-14 PROCEDURE — C9803 HOPD COVID-19 SPEC COLLECT: HCPCS

## 2020-12-14 PROCEDURE — U0004 COV-19 TEST NON-CDC HGH THRU: HCPCS | Performed by: INTERNAL MEDICINE

## 2021-01-18 DIAGNOSIS — R05.3 PERSISTENT DRY COUGH: ICD-10-CM

## 2021-01-18 DIAGNOSIS — J98.4 RESTRICTIVE LUNG DISEASE: ICD-10-CM

## 2021-01-18 DIAGNOSIS — R06.02 SHORTNESS OF BREATH: ICD-10-CM

## 2021-01-18 RX ORDER — ALBUTEROL SULFATE 90 UG/1
2 AEROSOL, METERED RESPIRATORY (INHALATION) EVERY 4 HOURS PRN
Qty: 18 G | Refills: 8 | Status: SHIPPED | OUTPATIENT
Start: 2021-01-18 | End: 2021-09-29 | Stop reason: SDUPTHER

## 2021-01-18 RX ORDER — MONTELUKAST SODIUM 10 MG/1
10 TABLET ORAL NIGHTLY
Qty: 90 TABLET | Refills: 6 | Status: SHIPPED | OUTPATIENT
Start: 2021-01-18 | End: 2021-09-29 | Stop reason: SDUPTHER

## 2021-01-18 RX ORDER — BENZONATATE 100 MG/1
100 CAPSULE ORAL 3 TIMES DAILY PRN
Qty: 42 CAPSULE | Refills: 0 | Status: SHIPPED | OUTPATIENT
Start: 2021-01-18 | End: 2021-03-31 | Stop reason: SDUPTHER

## 2021-01-18 RX ORDER — FLUTICASONE PROPIONATE 220 UG/1
1 AEROSOL, METERED RESPIRATORY (INHALATION)
Qty: 1 INHALER | Refills: 11 | Status: SHIPPED | OUTPATIENT
Start: 2021-01-18 | End: 2021-02-22

## 2021-01-18 NOTE — TELEPHONE ENCOUNTER
PT WAS A MATERNITY LEAVE RESCHEDULE AND HE ASKED THAT WE REFILL ALL HIS MEDS TO COVER HIM UNTIL YOU BACK. HE ALSO ASKED FOR A SCRIPT OF ALEENA PORTILLO.

## 2021-03-08 ENCOUNTER — TRANSCRIBE ORDERS (OUTPATIENT)
Dept: ADMINISTRATIVE | Facility: HOSPITAL | Age: 51
End: 2021-03-08

## 2021-03-08 DIAGNOSIS — R10.12 ABDOMINAL PAIN, LEFT UPPER QUADRANT: Primary | ICD-10-CM

## 2021-03-18 ENCOUNTER — BULK ORDERING (OUTPATIENT)
Dept: CASE MANAGEMENT | Facility: OTHER | Age: 51
End: 2021-03-18

## 2021-03-18 DIAGNOSIS — Z23 IMMUNIZATION DUE: ICD-10-CM

## 2021-03-24 ENCOUNTER — HOSPITAL ENCOUNTER (OUTPATIENT)
Dept: CT IMAGING | Facility: HOSPITAL | Age: 51
Discharge: HOME | End: 2021-03-24
Admitting: SURGERY

## 2021-03-24 DIAGNOSIS — R10.12 ABDOMINAL PAIN, LEFT UPPER QUADRANT: ICD-10-CM

## 2021-03-24 PROCEDURE — 25010000002 IOPAMIDOL 61 % SOLUTION: Performed by: SURGERY

## 2021-03-24 PROCEDURE — 74160 CT ABDOMEN W/CONTRAST: CPT

## 2021-03-24 PROCEDURE — 74160 CT ABDOMEN W/CONTRAST: CPT | Performed by: RADIOLOGY

## 2021-03-24 RX ADMIN — IOPAMIDOL 80 ML: 612 INJECTION, SOLUTION INTRAVENOUS at 11:13

## 2021-03-31 ENCOUNTER — OFFICE VISIT (OUTPATIENT)
Dept: PULMONOLOGY | Facility: CLINIC | Age: 51
End: 2021-03-31

## 2021-03-31 VITALS
WEIGHT: 180 LBS | TEMPERATURE: 97.8 F | OXYGEN SATURATION: 93 % | HEIGHT: 71 IN | HEART RATE: 76 BPM | BODY MASS INDEX: 25.2 KG/M2 | SYSTOLIC BLOOD PRESSURE: 144 MMHG | DIASTOLIC BLOOD PRESSURE: 84 MMHG

## 2021-03-31 DIAGNOSIS — G47.33 OSA (OBSTRUCTIVE SLEEP APNEA): ICD-10-CM

## 2021-03-31 DIAGNOSIS — J45.30 MILD PERSISTENT ASTHMA WITHOUT COMPLICATION: Primary | ICD-10-CM

## 2021-03-31 PROCEDURE — 99213 OFFICE O/P EST LOW 20 MIN: CPT | Performed by: INTERNAL MEDICINE

## 2021-03-31 RX ORDER — BUDESONIDE AND FORMOTEROL FUMARATE DIHYDRATE 80; 4.5 UG/1; UG/1
2 AEROSOL RESPIRATORY (INHALATION) 2 TIMES DAILY
Qty: 1 EACH | Refills: 11 | Status: SHIPPED | OUTPATIENT
Start: 2021-03-31 | End: 2021-09-29

## 2021-03-31 RX ORDER — BENZONATATE 100 MG/1
100 CAPSULE ORAL 3 TIMES DAILY PRN
Qty: 42 CAPSULE | Refills: 0 | Status: SHIPPED | OUTPATIENT
Start: 2021-03-31 | End: 2022-06-20 | Stop reason: SDUPTHER

## 2021-03-31 NOTE — PROGRESS NOTES
Chief complaint:   Chief Complaint   Patient presents with   • Asthma       History of present illness:   Mr. Linares is a very pleasant 50-year-old gentleman with a past medical history of asthma and obstructive sleep apnea.  He is currently on albuterol inhaler and Flovent inhaler.  He is compliant with his AutoPap machine.  He reports worsening of his asthma symptoms intermittently due to presence of allergen (pollen).  He denies any chest pain or racing of his heart.  He denies any fever chills night sweats or weight loss.    Review of Systems:   General ROS: negative for chills, fatigue or fever  Psychological ROS: negative for anxiety or depression  ENT ROS: negative for headaches, visual changes or vocal changes  Respiratory ROS: Negative for cough.  Intermittently positive shortness of breath  Cardiovascular ROS: Negative for chest pain.  Intermittently positive for dyspnea on exertion  Gastrointestinal ROS: no abdominal pain, change in bowel habits, or black or bloody stools  Musculoskeletal ROS: negative for joint pain, joint stiffness or joint swelling  Neurological ROS: no TIA or stroke symptoms  Heme- no bleeding, no lumps and bumps in armpit  Skin- no bruises, no rash    Past medical history, past surgical history, social history and family history:  •  has a past medical history of Headache, Low back pain, and Sleep apnea.  •  has a past surgical history that includes pr myelography via lumbar injection rs&i cervical (N/A, 10/29/2019).  •  reports that he has never smoked. He has never used smokeless tobacco. He reports that he does not drink alcohol and does not use drugs.  • family history includes Arthritis in his father and mother; Diabetes in his mother; Heart disease in his father and mother.     Medication and allergies:  • Active medication:  Current Outpatient Medications on File Prior to Visit   Medication Sig   • albuterol sulfate  (90 Base) MCG/ACT inhaler Inhale 2 puffs Every 4 (Four)  "Hours As Needed for Wheezing.   • celecoxib (CeleBREX) 200 MG capsule Take 1 capsule by mouth 2 (Two) Times a Day.   • esomeprazole (NexIUM) 40 MG capsule Take 40 mg by mouth daily.   • FLUoxetine (PROzac) 10 MG capsule Take 10 mg by mouth Daily.   • gabapentin (NEURONTIN) 300 MG capsule Take 300 mg by mouth 4 (Four) Times a Day.   • HYDROcodone-acetaminophen (NORCO)  MG per tablet Take 1 tablet by mouth Every 6 (Six) Hours As Needed for Moderate Pain .   • hydrOXYzine (ATARAX) 25 MG tablet Take 25 mg by mouth 2 (two) times a day.   • methocarbamol (ROBAXIN) 750 MG tablet Take 750 mg by mouth 4 (Four) Times a Day As Needed for Muscle Spasms.   • montelukast (SINGULAIR) 10 MG tablet Take 1 tablet by mouth Every Night.   • [DISCONTINUED] benzonatate (TESSALON) 100 MG capsule Take 1 capsule by mouth 3 (Three) Times a Day As Needed for Cough.   • [DISCONTINUED] fluticasone (Flovent Diskus) 50 MCG/BLIST diskus inhaler Inhale 1 puff 2 (Two) Times a Day.     No current facility-administered medications on file prior to visit.        Allergies:    Patient has no known allergies.      Vital Signs    height is 180.3 cm (71\") and weight is 81.6 kg (180 lb). His temporal temperature is 97.8 °F (36.6 °C). His blood pressure is 144/84 and his pulse is 76. His oxygen saturation is 93%.      Physical Exam:  Constitutional:  oriented to person, place, and time. No distress.   Head: Normocephalic and atraumatic.   Right Ear and left Ear : External ear normal.   Nose: Nose normal.   Eyes: Pupils are equal, round, and reactive to light.  No scleral icterus.   Neck: Normal range of motion. Neck supple.    Cardiovascular: Normal rate, regular rhythm, normal heart sounds. No murmur heard.  Pulmonary: Bilateral air entry equal.  No wheezing.  No crackles.  Abdominal: Soft. Bowel sounds are normal. There is no tenderness.   Musculoskeletal: Normal range of motion. Exhibits no deformity.   Neurological: alert and oriented to person, " place, and time. No cranial nerve deficit. Coordination normal.   Skin: Skin is warm and dry. No erythema.   Psychiatric:Normal mood and affect.   behavior is normal.     Result review:    EXAM: CT CHEST HI RESOLUTION-            CLINICAL INDICATION:Interstitial lung disease; J98.4-Other disorders of  lung      COMPARISON: NONE.     TECHNIQUE: Multiple axial CT images were obtained from lung apex through  upper abdomen WITHOUT administration of IV contrast. Reformatted images  in the coronal and/or sagittal plane(s) were generated from the axial  data set to facilitate diagnostic accuracy and/or surgical planning.  High-resolution sequences at selected axial intervals with patient in  prone and supine positions per high-resolution protocol.     Radiation dose reduction techniques were utilized per ALARA protocol.  Automated exposure control was initiated through either or [employer] or  DoseRight software packages by  protocol.    DOSE (DLP mGy-cm): 507.73 mGy.cm        FINDINGS:     LUNGS: THE PULMONARY INTERSTITIUM IS UNREMARKABLE. NO PERIPHERAL  FIBROSIS OR DISTRIBUTED AREAS OF GROUNDGLASS ATTENUATION ARE IDENTIFIED  THROUGHOUT THE LUNG CLARK. NO CENTRILOBULAR NODULES OR PERILYMPHATIC  NODULES IDENTIFIED.     HEART: Unremarkable.     PERICARDIUM: No effusion.     MEDIASTINUM: No masses. No enlarged lymph nodes.  No fluid collections.     PLEURA: No pleural effusion. No pleural mass or abnormal calcification.  No pneumothorax.     MAJOR AIRWAYS: Clear. No intrinsic mass.     VASCULATURE: No evidence of aneurysm.     VISUALIZED UPPER ABDOMEN:The upper abdomen is unremarkable as  visualized.     OTHER: None.     BONES: No acute bony abnormality.     IMPRESSION:  1. No CT findings of interstitial lung disease.  2. No air trapping identified.  3. Otherwise unremarkable exam.     This report was finalized on 10/31/2019 9:23 AM by Dr. Bradley Carr MD.       Echo interpretation Summary    · Left ventricular  wall thickness is consistent with borderline concentric hypertrophy.  · Left ventricular systolic function is normal.  · Estimated EF appears to be in the range of 66 - 70%  · Left ventricular diastolic function is normal.  · Estimated right ventricular systolic pressure from tricuspid regurgitation is mildly elevated (35-45 mmHg).  · There is no evidence of pericardial effusion.        PFT:Flow volume loop was assessed.  Spirometry showed no obstruction.  There was no significant bronchodilator response.  Lung volumes show a mild restrictive ventilatory defect.  Significant air trapping noted.  Diffusing capacity, uncorrected for hemoglobin, is mildly reduced.  Expiration last only for 5 seconds which limit interpretability of this test.    Assessment and plan:   Diagnosis Plan   1. Mild persistent asthma without complication:    He is using albuterol inhaler at least 2 times per day.  I have step up on the inhaler.  Started the patient on Symbicort 2 puffs twice daily.  We will reassess the patient in 6 months for possible stepdown of the therapy. budesonide-formoterol (SYMBICORT) 80-4.5 MCG/ACT inhaler  On albuterol inhaler  On Singulair p.o.    benzonatate (TESSALON) 100 MG capsule   2. MAK (obstructive sleep apnea)   patient is compliant with AutoPap.             Follow up in 6 months and as needed.       Thank you for involving us in the care of the patient.  Fausto Sanders MD  Pulmonary and Critical Care Medicine

## 2021-04-27 ENCOUNTER — TRANSCRIBE ORDERS (OUTPATIENT)
Dept: ADMINISTRATIVE | Facility: HOSPITAL | Age: 51
End: 2021-04-27

## 2021-04-27 DIAGNOSIS — Z01.818 PRE-OPERATIVE CLEARANCE: Primary | ICD-10-CM

## 2021-06-08 ENCOUNTER — LAB (OUTPATIENT)
Dept: LAB | Facility: HOSPITAL | Age: 51
End: 2021-06-08

## 2021-06-08 DIAGNOSIS — Z01.818 PRE-OPERATIVE CLEARANCE: ICD-10-CM

## 2021-09-29 ENCOUNTER — OFFICE VISIT (OUTPATIENT)
Dept: PULMONOLOGY | Facility: CLINIC | Age: 51
End: 2021-09-29

## 2021-09-29 VITALS
HEIGHT: 71 IN | BODY MASS INDEX: 27.3 KG/M2 | SYSTOLIC BLOOD PRESSURE: 115 MMHG | OXYGEN SATURATION: 97 % | WEIGHT: 195 LBS | DIASTOLIC BLOOD PRESSURE: 88 MMHG | HEART RATE: 75 BPM | TEMPERATURE: 98.2 F

## 2021-09-29 DIAGNOSIS — J45.30 MILD PERSISTENT ASTHMA WITHOUT COMPLICATION: Primary | ICD-10-CM

## 2021-09-29 DIAGNOSIS — G47.33 OBSTRUCTIVE SLEEP APNEA: ICD-10-CM

## 2021-09-29 DIAGNOSIS — R05.3 PERSISTENT DRY COUGH: ICD-10-CM

## 2021-09-29 PROCEDURE — 99214 OFFICE O/P EST MOD 30 MIN: CPT | Performed by: PHYSICIAN ASSISTANT

## 2021-09-29 RX ORDER — BUDESONIDE AND FORMOTEROL FUMARATE DIHYDRATE 160; 4.5 UG/1; UG/1
2 AEROSOL RESPIRATORY (INHALATION)
Qty: 1 EACH | Refills: 12 | Status: SHIPPED | OUTPATIENT
Start: 2021-09-29 | End: 2022-01-05 | Stop reason: SDUPTHER

## 2021-09-29 RX ORDER — MONTELUKAST SODIUM 10 MG/1
10 TABLET ORAL NIGHTLY
Qty: 90 TABLET | Refills: 6 | Status: SHIPPED | OUTPATIENT
Start: 2021-09-29 | End: 2022-01-05 | Stop reason: SDUPTHER

## 2021-09-29 RX ORDER — ALBUTEROL SULFATE 90 UG/1
2 AEROSOL, METERED RESPIRATORY (INHALATION) EVERY 4 HOURS PRN
Qty: 18 G | Refills: 8 | Status: SHIPPED | OUTPATIENT
Start: 2021-09-29 | End: 2022-01-05 | Stop reason: SDUPTHER

## 2021-09-29 NOTE — PROGRESS NOTES
"Chief Complaint  Asthma    Subjective          Reji Linares presents to [facility] PULMONARY AND CRITICAL CARE MEDICINE  History of Present Illness    Patient presents today for follow-up of asthma, obstructive sleep apnea.  He is doing well with the AutoPap machine at this time.  Denies any acute complications with use.  Patient inhaler therapy was escalated in the last visit.  He continues to note frequent shortness of breath, coughing, wheezing episodes.  No acute worsening today, but does believe that symptoms are not well maintained at this time.  He is currently using low-dose Symbicort, previously on high-dose Flovent.  Symptoms typically worsened with exposure to weather changes and fumes/perfumes/sprays.      Objective   Vital Signs:   /88   Pulse 75   Temp 98.2 °F (36.8 °C) (Temporal)   Ht 180.3 cm (71\")   Wt 88.5 kg (195 lb)   SpO2 97%   BMI 27.20 kg/m²     Physical Exam  Vitals reviewed.   Constitutional:       General: He is not in acute distress.     Appearance: He is well-developed. He is not diaphoretic.   HENT:      Head: Normocephalic and atraumatic.   Neck:      Thyroid: No thyromegaly.   Cardiovascular:      Rate and Rhythm: Normal rate and regular rhythm.      Heart sounds: Normal heart sounds, S1 normal and S2 normal.   Pulmonary:      Effort: Pulmonary effort is normal.      Breath sounds: No wheezing, rhonchi or rales.   Skin:     General: Skin is warm and dry.   Neurological:      Mental Status: He is alert and oriented to person, place, and time.   Psychiatric:         Behavior: Behavior normal.        Result Review :   The following data was reviewed by: Marielle Kunz PA-C on 09/29/2021:  Data reviewed: Radiologic studies previous CT chest high res 2019,  and previous PFT       Assessment and Plan    Diagnoses and all orders for this visit:    1. Mild persistent asthma without complication (Primary)  -     montelukast (SINGULAIR) 10 MG tablet; Take 1 " tablet by mouth Every Night.  Dispense: 90 tablet; Refill: 6    2. Persistent dry cough  -     montelukast (SINGULAIR) 10 MG tablet; Take 1 tablet by mouth Every Night.  Dispense: 90 tablet; Refill: 6    3. Obstructive sleep apnea    Other orders  -     albuterol sulfate  (90 Base) MCG/ACT inhaler; Inhale 2 puffs Every 4 (Four) Hours As Needed for Wheezing.  Dispense: 18 g; Refill: 8  -     budesonide-formoterol (Symbicort) 160-4.5 MCG/ACT inhaler; Inhale 2 puffs 2 (Two) Times a Day.  Dispense: 1 each; Refill: 12      Mild persistent asthma:   · Continue albuterol inhaler as needed  · Continue p.o. Singulair tablets.  · Escalating therapy from low-dose Symbicort to high-dose Symbicort inhaler 160 mcg 2 puffs twice daily  · If symptoms are not improved after several weeks of use, sample of a Spiriva Respimat 1.25 mcg inhaler was also provided for additional add-on therapy (while continuing high-dose Symbicort)  · Will contact PCP to obtain previous CBC with differential if available.  · We will consider repeat CT chest without contrast as needed  Previously negative for fibrotic changes.       MAK:   · Compliant with autopap nightly.  No acute issues with use.    Management obstructive sleep apnea also includes lifestyle modifications including weight loss, avoidance of alcohol, sedated, caffeine especially before bedtime, allowing adequate sleep time, and body position during sleep such as side versus back. 10% weight loss can result in a 50% improvement of the apnea-hypopnea index.  Untreated sleep apnea can lead to cardiovascular/metabolic disorder, neurocognitive deficit, daytime sleepiness, motor vehicle accidents, depression, mood disorders and reduced quality of life.  Patient understands the risk of untreated obstructive sleep apnea and benefit benefits of the treatment. Recommended at least 4 hours of use per night for 70% of every month (approximately 21 out of 30 days) per CMS guidelines.            Follow Up   Return in about 3 months (around 12/29/2021), or as needed, for Recheck.  Patient was given instructions and counseling regarding his condition or for health maintenance advice. Please see specific information pulled into the AVS if appropriate.

## 2022-01-05 ENCOUNTER — OFFICE VISIT (OUTPATIENT)
Dept: PULMONOLOGY | Facility: CLINIC | Age: 52
End: 2022-01-05

## 2022-01-05 VITALS
HEART RATE: 64 BPM | TEMPERATURE: 97.3 F | SYSTOLIC BLOOD PRESSURE: 134 MMHG | DIASTOLIC BLOOD PRESSURE: 78 MMHG | BODY MASS INDEX: 28.14 KG/M2 | WEIGHT: 201 LBS | HEIGHT: 71 IN | OXYGEN SATURATION: 97 %

## 2022-01-05 DIAGNOSIS — R05.3 PERSISTENT DRY COUGH: ICD-10-CM

## 2022-01-05 DIAGNOSIS — J45.50 SEVERE PERSISTENT ASTHMA WITHOUT COMPLICATION: Primary | ICD-10-CM

## 2022-01-05 DIAGNOSIS — G47.33 OBSTRUCTIVE SLEEP APNEA: ICD-10-CM

## 2022-01-05 PROCEDURE — 99214 OFFICE O/P EST MOD 30 MIN: CPT | Performed by: PHYSICIAN ASSISTANT

## 2022-01-05 RX ORDER — QUETIAPINE FUMARATE 100 MG/1
100 TABLET, FILM COATED ORAL NIGHTLY
COMMUNITY

## 2022-01-05 RX ORDER — ROPINIROLE 4 MG/1
4 TABLET, FILM COATED ORAL NIGHTLY
COMMUNITY
Start: 2021-12-30

## 2022-01-05 RX ORDER — BUSPIRONE HYDROCHLORIDE 30 MG/1
30 TABLET ORAL 3 TIMES DAILY
COMMUNITY
Start: 2021-11-11 | End: 2023-03-22 | Stop reason: ALTCHOICE

## 2022-01-05 RX ORDER — FAMOTIDINE 20 MG/1
20 TABLET, FILM COATED ORAL DAILY
COMMUNITY
Start: 2021-12-30

## 2022-01-05 RX ORDER — DULOXETIN HYDROCHLORIDE 60 MG/1
60 CAPSULE, DELAYED RELEASE ORAL 2 TIMES DAILY
COMMUNITY
Start: 2021-10-20

## 2022-01-05 RX ORDER — PROPRANOLOL HYDROCHLORIDE 20 MG/1
40 TABLET ORAL 2 TIMES DAILY
COMMUNITY

## 2022-01-05 RX ORDER — BUDESONIDE AND FORMOTEROL FUMARATE DIHYDRATE 160; 4.5 UG/1; UG/1
2 AEROSOL RESPIRATORY (INHALATION)
Qty: 1 EACH | Refills: 8 | Status: SHIPPED | OUTPATIENT
Start: 2022-01-05

## 2022-01-05 RX ORDER — ALBUTEROL SULFATE 90 UG/1
2 AEROSOL, METERED RESPIRATORY (INHALATION) EVERY 4 HOURS PRN
Qty: 18 G | Refills: 8 | Status: SHIPPED | OUTPATIENT
Start: 2022-01-05 | End: 2022-10-20 | Stop reason: SDUPTHER

## 2022-01-05 RX ORDER — KETOROLAC TROMETHAMINE 10 MG/1
TABLET, FILM COATED ORAL
COMMUNITY
Start: 2021-11-24 | End: 2022-08-26

## 2022-01-05 RX ORDER — CLONAZEPAM 0.5 MG/1
0.5 TABLET ORAL 2 TIMES DAILY PRN
COMMUNITY
Start: 2021-12-22

## 2022-01-05 RX ORDER — OLANZAPINE 7.5 MG/1
TABLET ORAL
COMMUNITY
Start: 2021-11-01 | End: 2022-01-05

## 2022-01-05 RX ORDER — MONTELUKAST SODIUM 10 MG/1
10 TABLET ORAL NIGHTLY
Qty: 90 TABLET | Refills: 8 | Status: SHIPPED | OUTPATIENT
Start: 2022-01-05 | End: 2022-10-26

## 2022-01-05 RX ORDER — BACLOFEN 10 MG/1
10 TABLET ORAL 2 TIMES DAILY
COMMUNITY
Start: 2021-12-16

## 2022-01-05 RX ORDER — TIOTROPIUM BROMIDE INHALATION SPRAY 1.56 UG/1
2 SPRAY, METERED RESPIRATORY (INHALATION)
Qty: 4 G | Refills: 8 | Status: SHIPPED | OUTPATIENT
Start: 2022-01-05 | End: 2022-10-20 | Stop reason: SDUPTHER

## 2022-01-05 NOTE — PROGRESS NOTES
"Chief Complaint  Asthma    Subjective          Reji Jean Linares presents to [facility] PULMONARY & CRITICAL CARE MEDICINE  History of Present Illness     Patient presented for follow-up of asthma, obstructive sleep apnea.  He states that he is doing fairly well again with a autopap machine at this time.  We had to increase inhaler therapy at the last visit from low-dose Symbicort to high-dose Symbicort.  Sample of Spiriva Respimat was also given, but not yet tried.  He feels that he is not yet well maintained on high-dose Symbicort.  Still notes persistent shortness of breath, worse upon laying down and intermittently throughout the day.  He is agreeable to escalation of therapy at this time.  As we discussed possible antieosinophilic therapy, he also admitted to easily developing hives upon scratching and noticing skin sensitivity.       Objective   Vital Signs:   /78 (BP Location: Right arm)   Pulse 64   Temp 97.3 °F (36.3 °C)   Ht 180.3 cm (71\")   Wt 91.2 kg (201 lb)   SpO2 97%   BMI 28.03 kg/m²     Physical Exam  Vitals reviewed.   Constitutional:       General: He is not in acute distress.     Appearance: He is well-developed. He is not diaphoretic.   HENT:      Head: Normocephalic and atraumatic.   Neck:      Thyroid: No thyromegaly.   Cardiovascular:      Rate and Rhythm: Normal rate and regular rhythm.      Heart sounds: Normal heart sounds, S1 normal and S2 normal.   Pulmonary:      Effort: Pulmonary effort is normal.      Breath sounds: No wheezing, rhonchi or rales.   Neurological:      Mental Status: He is alert and oriented to person, place, and time.   Psychiatric:         Behavior: Behavior normal.        Result Review :   The following data was reviewed by: Marielle Kunz PA-C on 01/05/2022:    Reviewed recent CT chest 2019, previous PFT, previous CBC with differential 2019        Assessment and Plan    Diagnoses and all orders for this visit:    1. Severe persistent " asthma without complication (Primary)  -     montelukast (SINGULAIR) 10 MG tablet; Take 1 tablet by mouth Every Night.  Dispense: 90 tablet; Refill: 8    2. Obstructive sleep apnea    3. Persistent dry cough  -     montelukast (SINGULAIR) 10 MG tablet; Take 1 tablet by mouth Every Night.  Dispense: 90 tablet; Refill: 8    4. Moderate persistent asthma without complication    Other orders  -     Tiotropium Bromide Monohydrate (Spiriva Respimat) 1.25 MCG/ACT aerosol solution inhaler; Inhale 2 puffs Daily.  Dispense: 4 g; Refill: 8  -     budesonide-formoterol (Symbicort) 160-4.5 MCG/ACT inhaler; Inhale 2 puffs 2 (Two) Times a Day.  Dispense: 1 each; Refill: 8  -     albuterol sulfate  (90 Base) MCG/ACT inhaler; Inhale 2 puffs Every 4 (Four) Hours As Needed for Wheezing.  Dispense: 18 g; Refill: 8      Severe persistent asthma:  · Continue albuterol inhaler as needed  · Continue Symbicort 160 mcg scheduled  · Started on Spiriva respimat 1.25 mcg twice daily sample  · Will get cbc w/differential (not received previously) from PCP. If not able to obtain, will order CBC with differential, IgE may consider Dupixent for possible atopic dermatitis  · Will consider antieosinophilic agent (previously qualified by CBC, however this was in 2019).     Addendum:   · Noted partial improvement with the addition of Spiriva Respimat 1.25 mcg, planned to continue  · CBC w/differential reviewed, significant eosinophilia noted at 0.85  · We will attempt to initiate Tezspire - may provide the most benefit due to response to multiple asthmatic triggers while also reducing eosinophil levels.  Contacted [facility] pharmacy for initiation.         Obstructive sleep apnea:  · Remains compliant with AutoPap therapy    Management obstructive sleep apnea also includes lifestyle modifications including weight loss, avoidance of alcohol, sedated, caffeine especially before bedtime, allowing adequate sleep time, and body position during sleep  such as side versus back. 10% weight loss can result in a 50% improvement of the apnea-hypopnea index.  Untreated sleep apnea can lead to cardiovascular/metabolic disorder, neurocognitive deficit, daytime sleepiness, motor vehicle accidents, depression, mood disorders and reduced quality of life.  Patient understands the risk of untreated obstructive sleep apnea and benefit benefits of the treatment. Recommended at least 4 hours of use per night for 70% of every month (approximately 21 out of 30 days) per CMS guidelines.       Follow Up   Return in about 4 months (around 5/5/2022), or as needed, for Next scheduled follow up.  Patient was given instructions and counseling regarding his condition or for health maintenance advice. Please see specific information pulled into the AVS if appropriate.

## 2022-01-31 ENCOUNTER — TELEPHONE (OUTPATIENT)
Dept: PULMONOLOGY | Facility: CLINIC | Age: 52
End: 2022-01-31

## 2022-01-31 DIAGNOSIS — J45.30 MILD PERSISTENT ASTHMA WITHOUT COMPLICATION: Primary | ICD-10-CM

## 2022-01-31 NOTE — TELEPHONE ENCOUNTER
PCP labs reviewed, but did not include the absolute eosinophil count (% value only, at 11.5).   Requested CBC with differential be completed at our [place] location. He will do so once available.   · Ordered CBC with differential for eosinophil count  · Some symptomatic benefit noted from Spiriva respimat 1.25 mcg. Will continue.

## 2022-03-10 ENCOUNTER — LAB (OUTPATIENT)
Dept: LAB | Facility: HOSPITAL | Age: 52
End: 2022-03-10

## 2022-03-10 DIAGNOSIS — J45.30 MILD PERSISTENT ASTHMA WITHOUT COMPLICATION: ICD-10-CM

## 2022-03-10 PROCEDURE — 85025 COMPLETE CBC W/AUTO DIFF WBC: CPT

## 2022-03-10 PROCEDURE — 36415 COLL VENOUS BLD VENIPUNCTURE: CPT

## 2022-03-11 LAB
BASOPHILS # BLD AUTO: 0.07 10*3/MM3 (ref 0–0.2)
BASOPHILS NFR BLD AUTO: 1.5 % (ref 0–1.5)
DEPRECATED RDW RBC AUTO: 43.1 FL (ref 37–54)
EOSINOPHIL # BLD AUTO: 0.85 10*3/MM3 (ref 0–0.4)
EOSINOPHIL NFR BLD AUTO: 18.2 % (ref 0.3–6.2)
ERYTHROCYTE [DISTWIDTH] IN BLOOD BY AUTOMATED COUNT: 13.2 % (ref 12.3–15.4)
HCT VFR BLD AUTO: 45.9 % (ref 37.5–51)
HGB BLD-MCNC: 15.5 G/DL (ref 13–17.7)
IMM GRANULOCYTES # BLD AUTO: 0.01 10*3/MM3 (ref 0–0.05)
IMM GRANULOCYTES NFR BLD AUTO: 0.2 % (ref 0–0.5)
LYMPHOCYTES # BLD AUTO: 1.81 10*3/MM3 (ref 0.7–3.1)
LYMPHOCYTES NFR BLD AUTO: 38.7 % (ref 19.6–45.3)
MCH RBC QN AUTO: 30.6 PG (ref 26.6–33)
MCHC RBC AUTO-ENTMCNC: 33.8 G/DL (ref 31.5–35.7)
MCV RBC AUTO: 90.5 FL (ref 79–97)
MONOCYTES # BLD AUTO: 0.36 10*3/MM3 (ref 0.1–0.9)
MONOCYTES NFR BLD AUTO: 7.7 % (ref 5–12)
NEUTROPHILS NFR BLD AUTO: 1.58 10*3/MM3 (ref 1.7–7)
NEUTROPHILS NFR BLD AUTO: 33.7 % (ref 42.7–76)
NRBC BLD AUTO-RTO: 0 /100 WBC (ref 0–0.2)
PLATELET # BLD AUTO: 232 10*3/MM3 (ref 140–450)
PMV BLD AUTO: 10.3 FL (ref 6–12)
RBC # BLD AUTO: 5.07 10*6/MM3 (ref 4.14–5.8)
WBC NRBC COR # BLD: 4.68 10*3/MM3 (ref 3.4–10.8)

## 2022-03-24 ENCOUNTER — TELEPHONE (OUTPATIENT)
Dept: PULMONOLOGY | Facility: CLINIC | Age: 52
End: 2022-03-24

## 2022-03-24 PROBLEM — J45.40 MODERATE PERSISTENT ASTHMA WITHOUT COMPLICATION: Status: ACTIVE | Noted: 2021-09-29

## 2022-03-24 NOTE — TELEPHONE ENCOUNTER
Called with test results.     CBC (absolute eosinophil count reviewed.  Notable for eosinophilia with elevation of 0.85).   He would be an appropriate candidate for biologic therapy due to persistent symptoms despite multiple inhaler trials.  Eosinophilia also noted, which is likely contributing to symptoms.  Symptoms typically worsen during times of exposures such as pollen changes or weather changes.  Symptoms have overall persistent/worsened over time despite other therapies.    After discussion of the therapy, he would like to proceed with this therapy.    Severe asthma with eosinophilia (0.85 on 3/10/22 labs):   · Contacting [facility] pharmacy today to order Tezspire

## 2022-03-29 ENCOUNTER — SPECIALTY PHARMACY (OUTPATIENT)
Dept: PHARMACY | Facility: HOSPITAL | Age: 52
End: 2022-03-29

## 2022-03-29 DIAGNOSIS — J45.909 ASTHMA, UNSPECIFIED ASTHMA SEVERITY, UNSPECIFIED WHETHER COMPLICATED, UNSPECIFIED WHETHER PERSISTENT: Primary | ICD-10-CM

## 2022-03-29 RX ORDER — TEZEPELUMAB-EKKO 210 MG/1.9ML
210 INJECTION, SOLUTION SUBCUTANEOUS
Qty: 1.91 ML | Refills: 5 | Status: SHIPPED | OUTPATIENT
Start: 2022-03-29 | End: 2022-08-26 | Stop reason: SDUPTHER

## 2022-04-12 ENCOUNTER — CLINICAL SUPPORT (OUTPATIENT)
Dept: PULMONOLOGY | Facility: CLINIC | Age: 52
End: 2022-04-12

## 2022-04-12 DIAGNOSIS — J45.50 SEVERE PERSISTENT ASTHMA WITHOUT COMPLICATION: Primary | ICD-10-CM

## 2022-04-12 PROCEDURE — 96372 THER/PROPH/DIAG INJ SC/IM: CPT | Performed by: PHYSICIAN ASSISTANT

## 2022-05-05 ENCOUNTER — SPECIALTY PHARMACY (OUTPATIENT)
Dept: PHARMACY | Facility: HOSPITAL | Age: 52
End: 2022-05-05

## 2022-05-17 ENCOUNTER — CLINICAL SUPPORT (OUTPATIENT)
Dept: PULMONOLOGY | Facility: CLINIC | Age: 52
End: 2022-05-17

## 2022-05-17 DIAGNOSIS — J45.50 SEVERE PERSISTENT ASTHMA WITHOUT COMPLICATION: Primary | ICD-10-CM

## 2022-05-17 PROCEDURE — 96372 THER/PROPH/DIAG INJ SC/IM: CPT | Performed by: PHYSICIAN ASSISTANT

## 2022-06-16 ENCOUNTER — SPECIALTY PHARMACY (OUTPATIENT)
Dept: PHARMACY | Facility: HOSPITAL | Age: 52
End: 2022-06-16

## 2022-06-20 ENCOUNTER — OFFICE VISIT (OUTPATIENT)
Dept: PULMONOLOGY | Facility: CLINIC | Age: 52
End: 2022-06-20

## 2022-06-20 VITALS
HEIGHT: 71 IN | OXYGEN SATURATION: 97 % | SYSTOLIC BLOOD PRESSURE: 118 MMHG | TEMPERATURE: 97.1 F | DIASTOLIC BLOOD PRESSURE: 78 MMHG | HEART RATE: 57 BPM | BODY MASS INDEX: 28 KG/M2 | WEIGHT: 200 LBS

## 2022-06-20 DIAGNOSIS — J45.30 MILD PERSISTENT ASTHMA WITHOUT COMPLICATION: ICD-10-CM

## 2022-06-20 DIAGNOSIS — J45.50 SEVERE PERSISTENT ASTHMA WITHOUT COMPLICATION: Primary | ICD-10-CM

## 2022-06-20 DIAGNOSIS — G47.33 OBSTRUCTIVE SLEEP APNEA: ICD-10-CM

## 2022-06-20 PROBLEM — J45.40 MODERATE PERSISTENT ASTHMA WITHOUT COMPLICATION: Status: RESOLVED | Noted: 2021-09-29 | Resolved: 2022-06-20

## 2022-06-20 PROBLEM — J45.20 MILD INTERMITTENT ASTHMA WITHOUT COMPLICATION: Status: RESOLVED | Noted: 2020-06-14 | Resolved: 2022-06-20

## 2022-06-20 PROCEDURE — 99214 OFFICE O/P EST MOD 30 MIN: CPT | Performed by: PHYSICIAN ASSISTANT

## 2022-06-20 RX ORDER — BENZONATATE 100 MG/1
100 CAPSULE ORAL 3 TIMES DAILY PRN
Qty: 42 CAPSULE | Refills: 6 | Status: SHIPPED | OUTPATIENT
Start: 2022-06-20

## 2022-06-20 NOTE — PROGRESS NOTES
"Chief Complaint  Severe persistent asthma without complication and Cough    Subjective        Reji Linares presents to [facility]  History of Present Illness     Patient presents today for follow-up of severe asthma.  Since the initiation of these his prior injections, he has noted overall improvement of symptoms.  He notes minimal wheezing.  He still notes intermittent dry cough, and has difficulty stopping once this occurs.  No known specific aggravating factors.  He continues on maintenance inhalers of Symbicort and Spiriva.  He attempts to maintain compliance with his device at nighttime.  No previous medical history.      Objective   Vital Signs:  /78 (BP Location: Left arm, Patient Position: Sitting)   Pulse 57   Temp 97.1 °F (36.2 °C)   Ht 180.3 cm (71\")   Wt 90.7 kg (200 lb)   SpO2 97%   BMI 27.89 kg/m²   Estimated body mass index is 27.89 kg/m² as calculated from the following:    Height as of this encounter: 180.3 cm (71\").    Weight as of this encounter: 90.7 kg (200 lb).      Physical Exam  Vitals reviewed.   Constitutional:       General: He is not in acute distress.     Appearance: He is well-developed. He is not diaphoretic.   HENT:      Head: Normocephalic and atraumatic.   Cardiovascular:      Rate and Rhythm: Normal rate and regular rhythm.      Heart sounds: Normal heart sounds, S1 normal and S2 normal.   Pulmonary:      Effort: Pulmonary effort is normal.      Breath sounds: No wheezing, rhonchi or rales.   Neurological:      Mental Status: He is alert and oriented to person, place, and time.   Psychiatric:         Behavior: Behavior normal.        Result Review :  The following data was reviewed by: Marielle Kunz PA-C on 06/20/2022:    Reviewed the previous CT chest.  Reviewed previous PFT.  Reviewed previous echocardiogram.    Assessment and Plan   Diagnoses and all orders for this visit:    1. Severe persistent asthma " without complication (Primary)  -     Tezepelumab-ekko (TEZSPIRE) injection 210 mg  -     benzonatate (TESSALON) 100 MG capsule; Take 1 capsule by mouth 3 (Three) Times a Day As Needed for Cough.  Dispense: 42 capsule; Refill: 6    2. Mild persistent asthma without complication    3. Obstructive sleep apnea        Severe persistent asthma:  · Continue albuterol inhaler as needed  · Continue Symbicort 160 mcg scheduled  · Continue Spiriva respimat 1.25 mcg twice daily sample  · Continue Tezspire injections as scheduled  · Ordered Tessalon capsules for as needed use with coughing.  · If symptoms do not resolve in the next month or so as he continues on the injections, contact office as needed.  May consider repeat CT at that time.        Obstructive sleep apnea:  · Remains compliant with AutoPap therapy      Management obstructive sleep apnea also includes lifestyle modifications including weight loss, avoidance of alcohol, sedated, caffeine especially before bedtime, allowing adequate sleep time, and body position during sleep such as side versus back. 10% weight loss can result in a 50% improvement of the apnea-hypopnea index.  Untreated sleep apnea can lead to cardiovascular/metabolic disorder, neurocognitive deficit, daytime sleepiness, motor vehicle accidents, depression, mood disorders and reduced quality of life.  Patient understands the risk of untreated obstructive sleep apnea and benefit benefits of the treatment. Recommended at least 4 hours of use per night for 70% of every month (approximately 21 out of 30 days) per CMS guidelines.         Follow Up   Return in about 4 months (around 10/20/2022), or if symptoms worsen or fail to improve, for Next scheduled follow up.  Patient was given instructions and counseling regarding his condition or for health maintenance advice. Please see specific information pulled into the AVS if appropriate.

## 2022-07-18 ENCOUNTER — CLINICAL SUPPORT (OUTPATIENT)
Dept: PULMONOLOGY | Facility: CLINIC | Age: 52
End: 2022-07-18

## 2022-07-18 DIAGNOSIS — J45.50 SEVERE PERSISTENT ASTHMA WITHOUT COMPLICATION: ICD-10-CM

## 2022-07-18 PROCEDURE — 96372 THER/PROPH/DIAG INJ SC/IM: CPT | Performed by: PHYSICIAN ASSISTANT

## 2022-08-12 ENCOUNTER — SPECIALTY PHARMACY (OUTPATIENT)
Dept: PHARMACY | Facility: HOSPITAL | Age: 52
End: 2022-08-12

## 2022-08-12 NOTE — PROGRESS NOTES
Specialty Pharmacy Refill Coordination Note      Name:  Reji Linares  :  1970  Date:  2022         Past Medical History:   Diagnosis Date   • Headache    • Low back pain    • Sleep apnea        Past Surgical History:   Procedure Laterality Date   • [place] MYELOGRAPHY VIA LUMBAR INJECTION RS & I CERVICAL N/A 10/29/2019    Procedure: IR myelogram, cervical;  Surgeon: Jose Daniel Kirby MD;  Location: [facility] INVASIVE LOCATION;  Service: Interventional Radiology       Social History     Socioeconomic History   • Marital status:    Tobacco Use   • Smoking status: Never Smoker   • Smokeless tobacco: Never Used   Vaping Use   • Vaping Use: Never used   Substance and Sexual Activity   • Alcohol use: No   • Drug use: No   • Sexual activity: Defer       Family History   Problem Relation Age of Onset   • Heart disease Mother    • Arthritis Mother    • Diabetes Mother    • Heart disease Father    • Arthritis Father        No Known Allergies    Current Outpatient Medications   Medication Sig Dispense Refill   • albuterol sulfate  (90 Base) MCG/ACT inhaler Inhale 2 puffs Every 4 (Four) Hours As Needed for Wheezing. 18 g 8   • baclofen (LIORESAL) 10 MG tablet      • benzonatate (TESSALON) 100 MG capsule Take 1 capsule by mouth 3 (Three) Times a Day As Needed for Cough. 42 capsule 6   • budesonide-formoterol (Symbicort) 160-4.5 MCG/ACT inhaler Inhale 2 puffs 2 (Two) Times a Day. 1 each 8   • busPIRone (BUSPAR) 30 MG tablet      • celecoxib (CeleBREX) 200 MG capsule Take 1 capsule by mouth 2 (Two) Times a Day. 60 capsule 1   • clonazePAM (KlonoPIN) 0.5 MG tablet      • DULoxetine (CYMBALTA) 60 MG capsule      • famotidine (PEPCID) 20 MG tablet      • gabapentin (NEURONTIN) 600 MG tablet Take 300 mg by mouth 3 (Three) Times a Day.     • HYDROcodone-acetaminophen (NORCO)  MG per tablet Take 1 tablet by mouth 3 (Three) Times a Day.     • hydrOXYzine (ATARAX) 50 MG tablet Take 50 mg by mouth 2  (Two) Times a Day.     • ketorolac (TORADOL) 10 MG tablet      • methocarbamol (ROBAXIN) 750 MG tablet Take 750 mg by mouth 4 (Four) Times a Day As Needed for Muscle Spasms.     • montelukast (SINGULAIR) 10 MG tablet Take 1 tablet by mouth Every Night. 90 tablet 8   • propranolol (INDERAL) 20 MG tablet propranolol 20 mg tablet     • QUEtiapine (SEROquel) 100 MG tablet quetiapine 100 mg tablet     • rOPINIRole (REQUIP) 4 MG tablet      • Tezepelumab-ekko (Tezspire) 210 MG/1.91ML injection Inject 210 mg under the skin into the appropriate area as directed Every 28 (Twenty-Eight) Days. 1.91 mL 5   • Tiotropium Bromide Monohydrate (Spiriva Respimat) 1.25 MCG/ACT aerosol solution inhaler Inhale 2 puffs Daily. 4 g 8     Current Facility-Administered Medications   Medication Dose Route Frequency Provider Last Rate Last Admin   • Tezepelumab-ekko (TEZSPIRE) injection 210 mg  210 mg Subcutaneous Q28 Days Marielle Kunz PA-C   210 mg at 07/18/22 0904   • Tezepelumab-ekko solution prefilled syringe 210 mg  210 mg Subcutaneous Q28 Days Marielle Kunz PA-C   210 mg at 04/12/22 1121         ASSESSMENT/PLAN:      Reji is a 51 y.o. male contacted today regarding refills of  Tezspire 210mg/1.91 ML injection specialty medication(s).    Reviewed and verified with patient:       Specialty medication(s) and dose(s) confirmed: yes    Refill Questions    Flowsheet Row Most Recent Value   Changes to allergies? No   Changes to medications? No   New conditions since last clinic visit No   Unplanned office visit, urgent care, ED, or hospital admission in the last 4 weeks  No   How does patient/caregiver feel medication is working? Very good   Financial problems or insurance changes  No   If yes, describe changes in insurance or financial issues. none   Since the previous refill, were any specialty medication doses or scheduled injections missed or delayed?  No   If yes, please provide the amount 0   Why were doses missed? n/a    Does this patient require a clinical escalation to a pharmacist? No                     Follow-up: 28 day(s)     Yelena Dover, Pharmacy Technician  Specialty Pharmacy Technician

## 2022-08-16 ENCOUNTER — CLINICAL SUPPORT (OUTPATIENT)
Dept: PULMONOLOGY | Facility: CLINIC | Age: 52
End: 2022-08-16

## 2022-08-16 DIAGNOSIS — J45.50 SEVERE PERSISTENT ASTHMA WITHOUT COMPLICATION: Primary | ICD-10-CM

## 2022-08-16 PROCEDURE — 96372 THER/PROPH/DIAG INJ SC/IM: CPT | Performed by: PHYSICIAN ASSISTANT

## 2022-08-26 ENCOUNTER — SPECIALTY PHARMACY (OUTPATIENT)
Dept: PHARMACY | Facility: HOSPITAL | Age: 52
End: 2022-08-26

## 2022-08-26 RX ORDER — TEZEPELUMAB-EKKO 210 MG/1.9ML
210 INJECTION, SOLUTION SUBCUTANEOUS
Qty: 1.91 ML | Refills: 5 | Status: SHIPPED | OUTPATIENT
Start: 2022-08-26 | End: 2023-03-30 | Stop reason: SDUPTHER

## 2022-08-26 NOTE — PROGRESS NOTES
[facility]  Asthma Management       Reji Linares is a 51 y.o. male referred by Pulmonology to the [facility] for severe  asthma.  The patient's current asthma regimen includes: Tezspire, Symbicort, Spiriva and Singulair and Pt reports good adherence to oral medications but admits he only takes Symbicort and Spiriva when he thinks he needs it . Pt reports he is not a smoker or exposed to second hand smoke.     Reji Linares reports asthma kept them from getting as much done some of the time and having shortness of breath 3-6 times/week.   Pt reports nighttime awakening 2-3 times/week due to asthma symptoms and using a rescue inhaler 2-3 times a week.      Pt self rates asthma control as Well Controlled.         He has been taking Tezspire injections given by an MA at the pulmonology clinic.  He denies any adverse effects and denies missing any doses.        Past Medical History:   Diagnosis Date   • Headache    • Low back pain    • Sleep apnea      Social History     Socioeconomic History   • Marital status:    Tobacco Use   • Smoking status: Never Smoker   • Smokeless tobacco: Never Used   Vaping Use   • Vaping Use: Never used   Substance and Sexual Activity   • Alcohol use: No   • Drug use: No   • Sexual activity: Defer     Patient has no known allergies.    Current Outpatient Medications:   •  albuterol sulfate  (90 Base) MCG/ACT inhaler, Inhale 2 puffs Every 4 (Four) Hours As Needed for Wheezing., Disp: 18 g, Rfl: 8  •  baclofen (LIORESAL) 10 MG tablet, , Disp: , Rfl:   •  benzonatate (TESSALON) 100 MG capsule, Take 1 capsule by mouth 3 (Three) Times a Day As Needed for Cough., Disp: 42 capsule, Rfl: 6  •  budesonide-formoterol (Symbicort) 160-4.5 MCG/ACT inhaler, Inhale 2 puffs 2 (Two) Times a Day., Disp: 1 each, Rfl: 8  •  busPIRone (BUSPAR) 30 MG tablet, , Disp: , Rfl:   •  celecoxib (CeleBREX) 200 MG capsule, Take 1 capsule by mouth 2 (Two)  Times a Day., Disp: 60 capsule, Rfl: 1  •  clonazePAM (KlonoPIN) 0.5 MG tablet, , Disp: , Rfl:   •  DULoxetine (CYMBALTA) 60 MG capsule, , Disp: , Rfl:   •  famotidine (PEPCID) 20 MG tablet, , Disp: , Rfl:   •  gabapentin (NEURONTIN) 600 MG tablet, Take 300 mg by mouth 3 (Three) Times a Day., Disp: , Rfl:   •  HYDROcodone-acetaminophen (NORCO)  MG per tablet, Take 1 tablet by mouth 3 (Three) Times a Day., Disp: , Rfl:   •  hydrOXYzine (ATARAX) 50 MG tablet, Take 50 mg by mouth 2 (Two) Times a Day., Disp: , Rfl:   •  ketorolac (TORADOL) 10 MG tablet, , Disp: , Rfl:   •  methocarbamol (ROBAXIN) 750 MG tablet, Take 750 mg by mouth 4 (Four) Times a Day As Needed for Muscle Spasms., Disp: , Rfl:   •  montelukast (SINGULAIR) 10 MG tablet, Take 1 tablet by mouth Every Night., Disp: 90 tablet, Rfl: 8  •  propranolol (INDERAL) 20 MG tablet, propranolol 20 mg tablet, Disp: , Rfl:   •  QUEtiapine (SEROquel) 100 MG tablet, quetiapine 100 mg tablet, Disp: , Rfl:   •  rOPINIRole (REQUIP) 4 MG tablet, , Disp: , Rfl:   •  Tezepelumab-ekko (Tezspire) 210 MG/1.91ML injection, Inject 210 mg under the skin into the appropriate area as directed Every 28 (Twenty-Eight) Days., Disp: 1.91 mL, Rfl: 5  •  Tiotropium Bromide Monohydrate (Spiriva Respimat) 1.25 MCG/ACT aerosol solution inhaler, Inhale 2 puffs Daily., Disp: 4 g, Rfl: 8    Current Facility-Administered Medications:   •  Tezepelumab-ekko (TEZSPIRE) injection 210 mg, 210 mg, Subcutaneous, Q28 Days, Marielle Kunz PA-C, 210 mg at 07/18/22 0904  •  Tezepelumab-ekko solution prefilled syringe 210 mg, 210 mg, Subcutaneous, Q28 Days, Marielle Kunz PA-C, 210 mg at 04/12/22 1121        Asthma Control Test Results:   0-15 Very Poorly Controlled Asthma   15-20 Poorly Controlled Asthma  20-25 Well Controlled Asthma     Date  8/26/22        ACT Results 15        ACT Results Very Poorly  Controlled Asthma             Vaccination Status   COVID 19: reports UTD  Influenza:  reports he gets annually, plans to get when available  Pneumococcal: Needed, Recommended  Zoster: Needed, Recommended    Drug-Drug Interactions: None with Tezspire    Drug-Disease Interactions (non-cardioselective beta blockers, NSAIDs): Propranolol (for anxiety)     Patient Assistance: Not required    Inhaler Technique Observed? No  If yes, notes:     Treatment Goals: Risk Reduction and Symptom Control     Medication Assessment & Plan:     Patient will continue Tezspire 210 mg SubQ every 4 weeks for severe asthma given by Pulmonology MA. Advised Pt to avoid live vaccines while on this medication.     Advised Pt on the importance of continuing maintenance inhaler regimen and the importance of rinsing mouth after ICS use. This injection does not replace your maintenance inhaler and is not used to treat an asthma attack (use a rescue inhaler). He plans to start using Symbicort and Spiriva regularly as prescribed.    Recommended vaccinations: Pneumonia and Shingles.  He plans to get at our pharmacy on the date of his next injection.     Patient is on a non-selective beta blocker, propranolol.  Will inform provider.     Patient will continue regular follow-up with pulmonology. Patient will follow-up with specialty mail out services.  Will follow-up in 6 months, or sooner if needed.     Unique Dumont, PharmD  8/26/2022  09:44 EDT

## 2022-09-07 ENCOUNTER — SPECIALTY PHARMACY (OUTPATIENT)
Dept: PHARMACY | Facility: HOSPITAL | Age: 52
End: 2022-09-07

## 2022-09-07 NOTE — PROGRESS NOTES
Patient reports he has been using maintenance inhalers regularly.  He thinks it is going good.     His Asthma Control Test Score is 17, which has improved since our last conversation and he rates his asthma control as Well Controlled since we last talked.     Unique Dumont, PharmD  09/07/22  11:20 EDT

## 2022-09-08 ENCOUNTER — SPECIALTY PHARMACY (OUTPATIENT)
Dept: PHARMACY | Facility: HOSPITAL | Age: 52
End: 2022-09-08

## 2022-09-08 NOTE — PROGRESS NOTES
Specialty Pharmacy Refill Coordination Note      Name:  Reji Linares  :  1970  Date:  2022         Past Medical History:   Diagnosis Date   • Headache    • Low back pain    • Sleep apnea        Past Surgical History:   Procedure Laterality Date   • [place] MYELOGRAPHY VIA LUMBAR INJECTION RS & I CERVICAL N/A 10/29/2019    Procedure: IR myelogram, cervical;  Surgeon: Jose Daniel Kirby MD;  Location: [facility] INVASIVE LOCATION;  Service: Interventional Radiology       Social History     Socioeconomic History   • Marital status:    Tobacco Use   • Smoking status: Never Smoker   • Smokeless tobacco: Never Used   Vaping Use   • Vaping Use: Never used   Substance and Sexual Activity   • Alcohol use: No   • Drug use: No   • Sexual activity: Defer       Family History   Problem Relation Age of Onset   • Heart disease Mother    • Arthritis Mother    • Diabetes Mother    • Heart disease Father    • Arthritis Father        No Known Allergies    Current Outpatient Medications   Medication Sig Dispense Refill   • albuterol sulfate  (90 Base) MCG/ACT inhaler Inhale 2 puffs Every 4 (Four) Hours As Needed for Wheezing. 18 g 8   • baclofen (LIORESAL) 10 MG tablet Take 10 mg by mouth 2 (Two) Times a Day.     • benzonatate (TESSALON) 100 MG capsule Take 1 capsule by mouth 3 (Three) Times a Day As Needed for Cough. 42 capsule 6   • budesonide-formoterol (Symbicort) 160-4.5 MCG/ACT inhaler Inhale 2 puffs 2 (Two) Times a Day. 1 each 8   • busPIRone (BUSPAR) 30 MG tablet Take 30 mg by mouth 3 (Three) Times a Day.     • celecoxib (CeleBREX) 200 MG capsule Take 1 capsule by mouth 2 (Two) Times a Day. 60 capsule 1   • clonazePAM (KlonoPIN) 0.5 MG tablet Take 0.5 mg by mouth 2 (Two) Times a Day As Needed.     • DULoxetine (CYMBALTA) 60 MG capsule Take 60 mg by mouth 2 (Two) Times a Day.     • famotidine (PEPCID) 20 MG tablet Take 20 mg by mouth Daily.     • gabapentin (NEURONTIN) 600 MG tablet Take 300 mg by  mouth 3 (Three) Times a Day.     • HYDROcodone-acetaminophen (NORCO)  MG per tablet Take 1 tablet by mouth 3 (Three) Times a Day.     • hydrOXYzine (ATARAX) 50 MG tablet Take 50 mg by mouth 2 (Two) Times a Day.     • methocarbamol (ROBAXIN) 750 MG tablet Take 750 mg by mouth 4 (Four) Times a Day As Needed for Muscle Spasms.     • montelukast (SINGULAIR) 10 MG tablet Take 1 tablet by mouth Every Night. 90 tablet 8   • propranolol (INDERAL) 20 MG tablet Take 40 mg by mouth 2 (Two) Times a Day.     • QUEtiapine (SEROquel) 100 MG tablet Take 100 mg by mouth Every Night.     • rOPINIRole (REQUIP) 4 MG tablet Take 4 mg by mouth Every Night.     • Tezepelumab-ekko (Tezspire) 210 MG/1.91ML injection Inject 210 mg under the skin into the appropriate area as directed Every 28 (Twenty-Eight) Days. 1.91 mL 5   • Tiotropium Bromide Monohydrate (Spiriva Respimat) 1.25 MCG/ACT aerosol solution inhaler Inhale 2 puffs Daily. 4 g 8     No current facility-administered medications for this visit.         ASSESSMENT/PLAN:      Reji is a 51 y.o. male contacted today regarding refills of  Tezspire 210mg/1.91 ml specialty medication(s).    Reviewed and verified with patient:       Specialty medication(s) and dose(s) confirmed: yes    Refill Questions    Flowsheet Row Most Recent Value   Changes to allergies? No   Changes to medications? No   New conditions since last clinic visit No   Unplanned office visit, urgent care, ED, or hospital admission in the last 4 weeks  No   How does patient/caregiver feel medication is working? Very good   Financial problems or insurance changes  No   If yes, describe changes in insurance or financial issues. none   Since the previous refill, were any specialty medication doses or scheduled injections missed or delayed?  No   If yes, please provide the amount 0   Why were doses missed? n/a   Does this patient require a clinical escalation to a pharmacist? No                     Follow-up: 28 day(s)      Yelena Dover, Pharmacy Technician  Specialty Pharmacy Technician

## 2022-09-13 ENCOUNTER — CLINICAL SUPPORT (OUTPATIENT)
Dept: PULMONOLOGY | Facility: CLINIC | Age: 52
End: 2022-09-13

## 2022-09-13 DIAGNOSIS — J45.50 SEVERE PERSISTENT ASTHMA WITHOUT COMPLICATION: Primary | ICD-10-CM

## 2022-09-13 PROCEDURE — 96372 THER/PROPH/DIAG INJ SC/IM: CPT | Performed by: PHYSICIAN ASSISTANT

## 2022-10-17 ENCOUNTER — SPECIALTY PHARMACY (OUTPATIENT)
Dept: PHARMACY | Facility: HOSPITAL | Age: 52
End: 2022-10-17

## 2022-10-17 NOTE — PROGRESS NOTES
Specialty Pharmacy Refill Coordination Note      Name:  Reji Linares  :  1970  Date:  10/17/2022         Past Medical History:   Diagnosis Date   • Headache    • Low back pain    • Sleep apnea        Past Surgical History:   Procedure Laterality Date   • [place] MYELOGRAPHY VIA LUMBAR INJECTION RS & I CERVICAL N/A 10/29/2019    Procedure: IR myelogram, cervical;  Surgeon: Jose Daniel Kirby MD;  Location: [facility] INVASIVE LOCATION;  Service: Interventional Radiology       Social History     Socioeconomic History   • Marital status:    Tobacco Use   • Smoking status: Never   • Smokeless tobacco: Never   Vaping Use   • Vaping Use: Never used   Substance and Sexual Activity   • Alcohol use: No   • Drug use: No   • Sexual activity: Defer       Family History   Problem Relation Age of Onset   • Heart disease Mother    • Arthritis Mother    • Diabetes Mother    • Heart disease Father    • Arthritis Father        No Known Allergies    Current Outpatient Medications   Medication Sig Dispense Refill   • albuterol sulfate  (90 Base) MCG/ACT inhaler Inhale 2 puffs Every 4 (Four) Hours As Needed for Wheezing. 18 g 8   • baclofen (LIORESAL) 10 MG tablet Take 10 mg by mouth 2 (Two) Times a Day.     • benzonatate (TESSALON) 100 MG capsule Take 1 capsule by mouth 3 (Three) Times a Day As Needed for Cough. 42 capsule 6   • budesonide-formoterol (Symbicort) 160-4.5 MCG/ACT inhaler Inhale 2 puffs 2 (Two) Times a Day. 1 each 8   • busPIRone (BUSPAR) 30 MG tablet Take 30 mg by mouth 3 (Three) Times a Day.     • celecoxib (CeleBREX) 200 MG capsule Take 1 capsule by mouth 2 (Two) Times a Day. 60 capsule 1   • clonazePAM (KlonoPIN) 0.5 MG tablet Take 0.5 mg by mouth 2 (Two) Times a Day As Needed.     • DULoxetine (CYMBALTA) 60 MG capsule Take 60 mg by mouth 2 (Two) Times a Day.     • famotidine (PEPCID) 20 MG tablet Take 20 mg by mouth Daily.     • gabapentin (NEURONTIN) 600 MG tablet Take 300 mg by mouth 3  (Three) Times a Day.     • HYDROcodone-acetaminophen (NORCO)  MG per tablet Take 1 tablet by mouth 3 (Three) Times a Day.     • hydrOXYzine (ATARAX) 50 MG tablet Take 50 mg by mouth 2 (Two) Times a Day.     • methocarbamol (ROBAXIN) 750 MG tablet Take 750 mg by mouth 4 (Four) Times a Day As Needed for Muscle Spasms.     • montelukast (SINGULAIR) 10 MG tablet Take 1 tablet by mouth Every Night. 90 tablet 8   • Pneumococcal 20-Mica Conj Vacc (Prevnar 20) 0.5 ML suspension prefilled syringe vaccine Inject 0.5 mL into the appropriate muscle as directed by prescriber. 0.5 mL 0   • propranolol (INDERAL) 20 MG tablet Take 40 mg by mouth 2 (Two) Times a Day.     • QUEtiapine (SEROquel) 100 MG tablet Take 100 mg by mouth Every Night.     • rOPINIRole (REQUIP) 4 MG tablet Take 4 mg by mouth Every Night.     • Tezepelumab-ekko (Tezspire) 210 MG/1.91ML injection Inject 210 mg under the skin into the appropriate area as directed Every 28 (Twenty-Eight) Days. 1.91 mL 5   • Tiotropium Bromide Monohydrate (Spiriva Respimat) 1.25 MCG/ACT aerosol solution inhaler Inhale 2 puffs Daily. 4 g 8     No current facility-administered medications for this visit.         ASSESSMENT/PLAN:      Reji is a 51 y.o. male contacted today regarding refills of  Tezspire 210mg/1.91 ml specialty medication(s).    Reviewed and verified with patient:       Specialty medication(s) and dose(s) confirmed: yes    Refill Questions    Flowsheet Row Most Recent Value   Changes to allergies? No   Changes to medications? No   New conditions since last clinic visit No   Unplanned office visit, urgent care, ED, or hospital admission in the last 4 weeks  No   How does patient/caregiver feel medication is working? Very good   Financial problems or insurance changes  No   If yes, describe changes in insurance or financial issues. none   Since the previous refill, were any specialty medication doses or scheduled injections missed or delayed?  No   If yes, please  provide the amount 0   Why were doses missed? n/a   Does this patient require a clinical escalation to a pharmacist? No                     Follow-up: 28 day(s)     Yelena Dover, Pharmacy Technician  Specialty Pharmacy Technician

## 2022-10-20 ENCOUNTER — OFFICE VISIT (OUTPATIENT)
Dept: PULMONOLOGY | Facility: CLINIC | Age: 52
End: 2022-10-20

## 2022-10-20 VITALS
HEIGHT: 71 IN | DIASTOLIC BLOOD PRESSURE: 80 MMHG | OXYGEN SATURATION: 98 % | BODY MASS INDEX: 25.2 KG/M2 | TEMPERATURE: 97.1 F | HEART RATE: 69 BPM | WEIGHT: 180 LBS | SYSTOLIC BLOOD PRESSURE: 102 MMHG

## 2022-10-20 DIAGNOSIS — G47.33 OBSTRUCTIVE SLEEP APNEA: ICD-10-CM

## 2022-10-20 DIAGNOSIS — J45.50 SEVERE PERSISTENT ASTHMA WITHOUT COMPLICATION: Primary | ICD-10-CM

## 2022-10-20 PROCEDURE — 99214 OFFICE O/P EST MOD 30 MIN: CPT | Performed by: PHYSICIAN ASSISTANT

## 2022-10-20 PROCEDURE — 96372 THER/PROPH/DIAG INJ SC/IM: CPT | Performed by: PHYSICIAN ASSISTANT

## 2022-10-20 RX ORDER — ALBUTEROL SULFATE 90 UG/1
2 AEROSOL, METERED RESPIRATORY (INHALATION) EVERY 4 HOURS PRN
Qty: 18 G | Refills: 8 | Status: SHIPPED | OUTPATIENT
Start: 2022-10-20

## 2022-10-20 RX ORDER — TIOTROPIUM BROMIDE INHALATION SPRAY 1.56 UG/1
2 SPRAY, METERED RESPIRATORY (INHALATION)
Qty: 4 G | Refills: 8 | Status: SHIPPED | OUTPATIENT
Start: 2022-10-20

## 2022-10-20 NOTE — PROGRESS NOTES
"Chief Complaint  Asthma    Subjective        Reji Jean Linares presents to [facility] PULMONARY & CRITICAL CARE MEDICINE  History of Present Illness     Patient presents today for follow-up of asthma, obstructive sleep apnea.  He still coughing intermittently but tolerable.  He has had some overall symptomatic improvement after starting the biologic injections.  This is led him to discontinue use of Symbicort, but continues on Spiriva currently and as needed albuterol.  Remains compliant with his AutoPap device without significant issues.      Objective   Vital Signs:  /80   Pulse 69   Temp 97.1 °F (36.2 °C) (Temporal)   Ht 180.3 cm (71\")   Wt 81.6 kg (180 lb)   SpO2 98%   BMI 25.10 kg/m²   Estimated body mass index is 25.1 kg/m² as calculated from the following:    Height as of this encounter: 180.3 cm (71\").    Weight as of this encounter: 81.6 kg (180 lb).      Physical Exam  Vitals reviewed.   Constitutional:       General: He is not in acute distress.     Appearance: He is well-developed. He is not diaphoretic.   HENT:      Head: Normocephalic and atraumatic.   Cardiovascular:      Rate and Rhythm: Normal rate and regular rhythm.      Heart sounds: Normal heart sounds, S1 normal and S2 normal.   Pulmonary:      Effort: Pulmonary effort is normal.      Breath sounds: No wheezing, rhonchi or rales.   Neurological:      Mental Status: He is alert and oriented to person, place, and time.   Psychiatric:         Behavior: Behavior normal.        Result Review :  The following data was reviewed by: Marielle Kunz PA-C on 10/20/2022:      Reviewed the previous high-resolution CT chest.  Reviewed previous PFT.      Assessment and Plan   Diagnoses and all orders for this visit:    1. Severe persistent asthma without complication (Primary)  -     Tezepelumab-ekko (TEZSPIRE) injection 210 mg    2. Obstructive sleep apnea    Other orders  -     Tiotropium Bromide Monohydrate (Spiriva " Respimat) 1.25 MCG/ACT aerosol solution inhaler; Inhale 2 puffs Daily.  Dispense: 4 g; Refill: 8  -     albuterol sulfate  (90 Base) MCG/ACT inhaler; Inhale 2 puffs Every 4 (Four) Hours As Needed for Wheezing.  Dispense: 18 g; Refill: 8        Severe persistent asthma:  • Continue albuterol inhaler as needed  • Continue Spiriva respimat 1.25 mcg twice daily sample  • Continue Tezspire injections as scheduled  • Discussed in the case of symptomatic worsening to resume use of Symbicort 162 puffs twice daily (or less than dose as appropriate)       Obstructive sleep apnea:  • Remains compliant with AutoPap.    Management obstructive sleep apnea also includes lifestyle modifications including weight loss, avoidance of alcohol, sedated, caffeine especially before bedtime, allowing adequate sleep time, and body position during sleep such as side versus back. 10% weight loss can result in a 50% improvement of the apnea-hypopnea index.  Untreated sleep apnea can lead to cardiovascular/metabolic disorder, neurocognitive deficit, daytime sleepiness, motor vehicle accidents, depression, mood disorders and reduced quality of life.  Patient understands the risk of untreated obstructive sleep apnea and benefit benefits of the treatment. Recommended at least 4 hours of use per night for 70% of every month (approximately 21 out of 30 days) per CMS guidelines.           Follow Up   Return in about 6 months (around 4/20/2023), or if symptoms worsen or fail to improve, for Next scheduled follow up.  Patient was given instructions and counseling regarding his condition or for health maintenance advice. Please see specific information pulled into the AVS if appropriate.

## 2022-10-26 DIAGNOSIS — R05.3 PERSISTENT DRY COUGH: ICD-10-CM

## 2022-10-26 DIAGNOSIS — J45.50 SEVERE PERSISTENT ASTHMA WITHOUT COMPLICATION: ICD-10-CM

## 2022-10-26 RX ORDER — MONTELUKAST SODIUM 10 MG/1
10 TABLET ORAL NIGHTLY
Qty: 30 TABLET | Refills: 5 | Status: SHIPPED | OUTPATIENT
Start: 2022-10-26

## 2022-11-17 ENCOUNTER — SPECIALTY PHARMACY (OUTPATIENT)
Dept: PHARMACY | Facility: HOSPITAL | Age: 52
End: 2022-11-17

## 2022-11-17 NOTE — PROGRESS NOTES
Specialty Pharmacy Refill Coordination Note      Name:  Reji Linares  :  1970  Date:  2022         Past Medical History:   Diagnosis Date   • Headache    • Low back pain    • Sleep apnea        Past Surgical History:   Procedure Laterality Date   • OK MYELOGRAPHY VIA LUMBAR INJECTION RS & I CERVICAL N/A 10/29/2019    Procedure: IR myelogram, cervical;  Surgeon: Jose aDniel Kirby MD;  Location: [facility] INVASIVE LOCATION;  Service: Interventional Radiology       Social History     Socioeconomic History   • Marital status:    Tobacco Use   • Smoking status: Never   • Smokeless tobacco: Never   Vaping Use   • Vaping Use: Never used   Substance and Sexual Activity   • Alcohol use: No   • Drug use: No   • Sexual activity: Defer       Family History   Problem Relation Age of Onset   • Heart disease Mother    • Arthritis Mother    • Diabetes Mother    • Heart disease Father    • Arthritis Father        No Known Allergies    Current Outpatient Medications   Medication Sig Dispense Refill   • albuterol sulfate  (90 Base) MCG/ACT inhaler Inhale 2 puffs Every 4 (Four) Hours As Needed for Wheezing. 18 g 8   • baclofen (LIORESAL) 10 MG tablet Take 10 mg by mouth 2 (Two) Times a Day.     • benzonatate (TESSALON) 100 MG capsule Take 1 capsule by mouth 3 (Three) Times a Day As Needed for Cough. 42 capsule 6   • budesonide-formoterol (Symbicort) 160-4.5 MCG/ACT inhaler Inhale 2 puffs 2 (Two) Times a Day. 1 each 8   • busPIRone (BUSPAR) 30 MG tablet Take 30 mg by mouth 3 (Three) Times a Day.     • celecoxib (CeleBREX) 200 MG capsule Take 1 capsule by mouth 2 (Two) Times a Day. 60 capsule 1   • clonazePAM (KlonoPIN) 0.5 MG tablet Take 0.5 mg by mouth 2 (Two) Times a Day As Needed.     • DULoxetine (CYMBALTA) 60 MG capsule Take 60 mg by mouth 2 (Two) Times a Day.     • famotidine (PEPCID) 20 MG tablet Take 20 mg by mouth Daily.     • gabapentin (NEURONTIN) 600 MG tablet Take 300 mg by mouth 3  (Three) Times a Day.     • HYDROcodone-acetaminophen (NORCO)  MG per tablet Take 1 tablet by mouth 3 (Three) Times a Day.     • hydrOXYzine (ATARAX) 50 MG tablet Take 50 mg by mouth 2 (Two) Times a Day.     • influenza vac split quad (Flulaval Quadrivalent) 0.5 ML suspension prefilled syringe injection Inject  into the appropriate muscle as directed by prescriber. 0.5 mL 0   • methocarbamol (ROBAXIN) 750 MG tablet Take 750 mg by mouth 4 (Four) Times a Day As Needed for Muscle Spasms.     • montelukast (SINGULAIR) 10 MG tablet TAKE 1 TABLET BY MOUTH EVERY NIGHT 30 tablet 5   • Pneumococcal 20-Mica Conj Vacc (Prevnar 20) 0.5 ML suspension prefilled syringe vaccine Inject 0.5 mL into the appropriate muscle as directed by prescriber. 0.5 mL 0   • propranolol (INDERAL) 20 MG tablet Take 40 mg by mouth 2 (Two) Times a Day.     • QUEtiapine (SEROquel) 100 MG tablet Take 100 mg by mouth Every Night.     • rOPINIRole (REQUIP) 4 MG tablet Take 4 mg by mouth Every Night.     • Tezepelumab-ekko (Tezspire) 210 MG/1.91ML injection Inject 210 mg under the skin into the appropriate area as directed Every 28 (Twenty-Eight) Days. 1.91 mL 5   • Tiotropium Bromide Monohydrate (Spiriva Respimat) 1.25 MCG/ACT aerosol solution inhaler Inhale 2 puffs Daily. 4 g 8   • Zoster Vac Recomb Adjuvanted (Shingrix) 50 MCG/0.5ML reconstituted suspension Inject  into the appropriate muscle as directed by prescriber. 1 each 0     No current facility-administered medications for this visit.         ASSESSMENT/PLAN:      Reji is a 52 y.o. male contacted today regarding refills of  Tezspire 210mg/ml specialty medication(s).    Reviewed and verified with patient:       Specialty medication(s) and dose(s) confirmed: yes    Refill Questions    Flowsheet Row Most Recent Value   Changes to allergies? No   Changes to medications? No   New conditions since last clinic visit No   Unplanned office visit, urgent care, ED, or hospital admission in the last 4  weeks  No   How does patient/caregiver feel medication is working? Very good   Financial problems or insurance changes  No   If yes, describe changes in insurance or financial issues. none   Since the previous refill, were any specialty medication doses or scheduled injections missed or delayed?  No   If yes, please provide the amount 0   Why were doses missed? n/a   Does this patient require a clinical escalation to a pharmacist? No                     Follow-up: 28 day(s)     Yelena Dover, Pharmacy Technician  Specialty Pharmacy Technician

## 2022-12-13 ENCOUNTER — CLINICAL SUPPORT (OUTPATIENT)
Dept: PULMONOLOGY | Facility: CLINIC | Age: 52
End: 2022-12-13

## 2022-12-13 DIAGNOSIS — J45.50 SEVERE PERSISTENT ASTHMA WITHOUT COMPLICATION: Primary | ICD-10-CM

## 2022-12-13 PROCEDURE — 96372 THER/PROPH/DIAG INJ SC/IM: CPT | Performed by: PHYSICIAN ASSISTANT

## 2023-01-06 ENCOUNTER — SPECIALTY PHARMACY (OUTPATIENT)
Dept: PHARMACY | Facility: HOSPITAL | Age: 53
End: 2023-01-06
Payer: MEDICARE

## 2023-01-06 NOTE — PROGRESS NOTES
Specialty Pharmacy Refill Coordination Note     Reji is a 52 y.o. male contacted today regarding refills of  Tezspire 210mg/1.91ml injection    specialty medication(s).    Reviewed and verified with patient:       Specialty medication(s) and dose(s) confirmed: yes    Refill Questions    Flowsheet Row Most Recent Value   Changes to allergies? No   Changes to medications? No   New conditions since last clinic visit No   Unplanned office visit, urgent care, ED, or hospital admission in the last 4 weeks  No   How does patient/caregiver feel medication is working? Very good   Financial problems or insurance changes  No   Since the previous refill, were any specialty medication doses or scheduled injections missed or delayed?  No   Does this patient require a clinical escalation to a pharmacist? No                     Follow-up: 28 day(s)     Yoly Aiken, Pharmacy Technician  Specialty Pharmacy Technician

## 2023-01-11 ENCOUNTER — DOCUMENTATION (OUTPATIENT)
Dept: PULMONOLOGY | Facility: CLINIC | Age: 53
End: 2023-01-11
Payer: MEDICARE

## 2023-01-11 NOTE — PROGRESS NOTES
Biologic therapy authorization request (Tezspire):     Patient was initiated on biologic therapy for uncontrolled asthma around April 2022.  Symptoms have been uncontrolled overall despite use of maximum inhaler therapy, and required intermittent use of oral steroids.  Oral steroids are effective but avoided long-term due to side effects with long-term use including osteoporosis, immunocompromisation, skin thinning, glucose intolerance, adrenal dysregulation, etc.     Since starting this prior, he has noted a significant degree of symptomatic improvement/maintenance.  At the last visit from October 2022, he had been able to discontinue regular use of maintenance inhalers and still noted reduction in symptomatic exacerbations, thus leading to reduced steroids (both inhaled and systemic).     Request is being made to continue the medication at an affordable, reduced cost as this has been significantly beneficial to his chronic respiratory impairment.  At the current cost, he would not be able to continue this medication and his underlying asthma will worsen.   This could result in more frequent use of steroids, and could even lead to ED visits/hospitalizations.

## 2023-01-18 DIAGNOSIS — J45.50 SEVERE PERSISTENT ASTHMA WITHOUT COMPLICATION: Primary | ICD-10-CM

## 2023-01-27 NOTE — PROGRESS NOTES
Rx is no longer covered. We are currently in the process of getting it approved via medical billing and had to send an appeal. Will pause program until we get the results of the appeal.     Vicky Johnson [facility]  01/27/23  13:11 EST

## 2023-02-21 ENCOUNTER — OFFICE VISIT (OUTPATIENT)
Dept: UROLOGY | Facility: CLINIC | Age: 53
End: 2023-02-21
Payer: MEDICARE

## 2023-02-21 VITALS — BODY MASS INDEX: 27.58 KG/M2 | HEIGHT: 71 IN | WEIGHT: 197 LBS

## 2023-02-21 DIAGNOSIS — R35.0 FREQUENCY OF URINATION: ICD-10-CM

## 2023-02-21 DIAGNOSIS — R39.11 BENIGN PROSTATIC HYPERPLASIA WITH URINARY HESITANCY: Primary | ICD-10-CM

## 2023-02-21 DIAGNOSIS — R11.0 NAUSEA WITHOUT VOMITING: ICD-10-CM

## 2023-02-21 DIAGNOSIS — K21.9 GASTROESOPHAGEAL REFLUX DISEASE WITHOUT ESOPHAGITIS: ICD-10-CM

## 2023-02-21 DIAGNOSIS — N40.1 BENIGN PROSTATIC HYPERPLASIA WITH URINARY HESITANCY: Primary | ICD-10-CM

## 2023-02-21 LAB
BILIRUB BLD-MCNC: ABNORMAL MG/DL
CLARITY, POC: CLEAR
COLOR UR: YELLOW
EXPIRATION DATE: ABNORMAL
GLUCOSE UR STRIP-MCNC: NEGATIVE MG/DL
KETONES UR QL: NEGATIVE
LEUKOCYTE EST, POC: NEGATIVE
Lab: ABNORMAL
NITRITE UR-MCNC: NEGATIVE MG/ML
PH UR: 6 [PH] (ref 5–8)
PROT UR STRIP-MCNC: NEGATIVE MG/DL
PSA SERPL-MCNC: 0.74 NG/ML (ref 0–4)
RBC # UR STRIP: NEGATIVE /UL
SP GR UR: 1.02 (ref 1–1.03)
UROBILINOGEN UR QL: NORMAL

## 2023-02-21 PROCEDURE — 81003 URINALYSIS AUTO W/O SCOPE: CPT

## 2023-02-21 PROCEDURE — 84153 ASSAY OF PSA TOTAL: CPT

## 2023-02-21 PROCEDURE — 51798 US URINE CAPACITY MEASURE: CPT

## 2023-02-21 PROCEDURE — 99203 OFFICE O/P NEW LOW 30 MIN: CPT

## 2023-02-21 RX ORDER — TAMSULOSIN HYDROCHLORIDE 0.4 MG/1
1 CAPSULE ORAL DAILY
Qty: 30 CAPSULE | Refills: 0 | Status: SHIPPED | OUTPATIENT
Start: 2023-02-21 | End: 2023-03-22

## 2023-02-21 RX ORDER — GABAPENTIN 600 MG/1
600 TABLET ORAL 3 TIMES DAILY
COMMUNITY
Start: 2023-01-19

## 2023-02-21 RX ORDER — BUSPIRONE HYDROCHLORIDE 30 MG/1
30 TABLET ORAL DAILY
COMMUNITY
Start: 2022-09-16

## 2023-02-21 NOTE — PROGRESS NOTES
"Chief Complaint:    Chief Complaint   Patient presents with   • Urinary Urgency   • hesitancy     New patient        Vital Signs:   Ht 180.3 cm (71\")   Wt 89.4 kg (197 lb)   BMI 27.48 kg/m²   Body mass index is 27.48 kg/m².      HPI:  Reji Linares is a 52 y.o. male who presents today for initial evaluation     History of Present Illness  Mr. Linares presents to the clinic for initial evaluation of urinary hesitancy and weak stream.  Patient reports that symptoms have been persistent for roughly 1 year now.  He is not currently taking any medication for his prostate.  He is unsure when his last PSA was checked.  He denies any significant family history for prostate cancer.  He has an IPSS score of 17 at this time.  He gets up roughly 1-2 times throughout the night.  He complains of pushing or straining to begin urination, weak urinary stream, stopping and starting again several times when urinating, frequency, and sensation of incomplete bladder emptying.  Prostate on physical exam today is slightly enlarged with no tenderness, bogginess, or nodules noted.  I am recommending a trial of Flomax at this time.  Patient also has a medical history significant for GERD and nausea.  He also has had to have an EGD done with esophageal stretch.  His last colonoscopy was normal.  He would like a referral to GI for work-up.      Past Medical History:  Past Medical History:   Diagnosis Date   • Headache    • Low back pain    • Sleep apnea        Current Meds:  Current Outpatient Medications   Medication Sig Dispense Refill   • albuterol sulfate  (90 Base) MCG/ACT inhaler Inhale 2 puffs Every 4 (Four) Hours As Needed for Wheezing. 18 g 8   • baclofen (LIORESAL) 10 MG tablet Take 10 mg by mouth 2 (Two) Times a Day.     • benzonatate (TESSALON) 100 MG capsule Take 1 capsule by mouth 3 (Three) Times a Day As Needed for Cough. 42 capsule 6   • budesonide-formoterol (Symbicort) 160-4.5 MCG/ACT inhaler Inhale 2 puffs 2 (Two) " Times a Day. 1 each 8   • busPIRone (BUSPAR) 30 MG tablet Take 30 mg by mouth 3 (Three) Times a Day.     • busPIRone (BUSPAR) 30 MG tablet Take 30 mg by mouth.     • celecoxib (CeleBREX) 200 MG capsule Take 1 capsule by mouth 2 (Two) Times a Day. 60 capsule 1   • clonazePAM (KlonoPIN) 0.5 MG tablet Take 0.5 mg by mouth 2 (Two) Times a Day As Needed.     • DULoxetine (CYMBALTA) 60 MG capsule Take 60 mg by mouth 2 (Two) Times a Day.     • famotidine (PEPCID) 20 MG tablet Take 20 mg by mouth Daily.     • gabapentin (NEURONTIN) 600 MG tablet Take 600 mg by mouth 3 (Three) Times a Day.     • HYDROcodone-acetaminophen (NORCO)  MG per tablet Take 1 tablet by mouth 3 (Three) Times a Day.     • hydrOXYzine (ATARAX) 50 MG tablet Take 50 mg by mouth 2 (Two) Times a Day.     • influenza vac split quad (Flulaval Quadrivalent) 0.5 ML suspension prefilled syringe injection Inject  into the appropriate muscle as directed by prescriber. 0.5 mL 0   • methocarbamol (ROBAXIN) 750 MG tablet Take 750 mg by mouth 4 (Four) Times a Day As Needed for Muscle Spasms.     • montelukast (SINGULAIR) 10 MG tablet TAKE 1 TABLET BY MOUTH EVERY NIGHT 30 tablet 5   • Pneumococcal 20-Mica Conj Vacc (Prevnar 20) 0.5 ML suspension prefilled syringe vaccine Inject 0.5 mL into the appropriate muscle as directed by prescriber. 0.5 mL 0   • propranolol (INDERAL) 20 MG tablet Take 40 mg by mouth 2 (Two) Times a Day.     • QUEtiapine (SEROquel) 100 MG tablet Take 100 mg by mouth Every Night.     • rOPINIRole (REQUIP) 4 MG tablet Take 4 mg by mouth Every Night.     • Tezepelumab-ekko (Tezspire) 210 MG/1.91ML injection Inject 210 mg under the skin into the appropriate area as directed Every 28 (Twenty-Eight) Days. 1.91 mL 5   • Tiotropium Bromide Monohydrate (Spiriva Respimat) 1.25 MCG/ACT aerosol solution inhaler Inhale 2 puffs Daily. 4 g 8   • Zoster Vac Recomb Adjuvanted (Shingrix) 50 MCG/0.5ML reconstituted suspension Inject 0.5 mL into the appropriate  muscle as directed by prescriber. 0.5 mL 0   • tamsulosin (FLOMAX) 0.4 MG capsule 24 hr capsule Take 1 capsule by mouth Daily. 30 capsule 0     No current facility-administered medications for this visit.        Allergies:   No Known Allergies     Past Surgical History:  Past Surgical History:   Procedure Laterality Date   • [place] MYELOGRAPHY VIA LUMBAR INJECTION RS&I CERVICAL N/A 10/29/2019    Procedure: IR myelogram, cervical;  Surgeon: Jose Daniel Kirby MD;  Location: [facility] INVASIVE LOCATION;  Service: Interventional Radiology       Social History:  Social History     Socioeconomic History   • Marital status:    Tobacco Use   • Smoking status: Never   • Smokeless tobacco: Never   Vaping Use   • Vaping Use: Never used   Substance and Sexual Activity   • Alcohol use: No   • Drug use: No   • Sexual activity: Defer       Family History:  Family History   Problem Relation Age of Onset   • Heart disease Mother    • Arthritis Mother    • Diabetes Mother    • Heart disease Father    • Arthritis Father        Review of Systems:  Review of Systems   Constitutional: Positive for fatigue. Negative for chills, fever and unexpected weight change.   HENT: Negative for congestion and sinus pressure.    Respiratory: Negative for chest tightness and shortness of breath.    Cardiovascular: Negative for chest pain.   Gastrointestinal: Positive for abdominal distention and abdominal pain. Negative for constipation, diarrhea, nausea and vomiting.   Genitourinary: Positive for difficulty urinating and urgency. Negative for dysuria, flank pain, frequency, hematuria, penile pain and scrotal swelling.   Musculoskeletal: Positive for back pain. Negative for neck pain.   Skin: Negative for rash.   Neurological: Negative for dizziness and headaches.   Hematological: Bruises/bleeds easily.   Psychiatric/Behavioral: Negative for confusion and suicidal ideas. The patient is not nervous/anxious.        Physical Exam:  Physical  Exam  Constitutional:       General: He is not in acute distress.     Appearance: Normal appearance.   HENT:      Head: Normocephalic and atraumatic.      Nose: Nose normal.      Mouth/Throat:      Mouth: Mucous membranes are moist.   Eyes:      Conjunctiva/sclera: Conjunctivae normal.   Cardiovascular:      Rate and Rhythm: Normal rate and regular rhythm.      Pulses: Normal pulses.      Heart sounds: Normal heart sounds.   Pulmonary:      Effort: Pulmonary effort is normal.      Breath sounds: Normal breath sounds.   Abdominal:      General: Bowel sounds are normal.      Palpations: Abdomen is soft.      Tenderness: There is no right CVA tenderness or left CVA tenderness.   Genitourinary:     Prostate: Normal.   Musculoskeletal:         General: Normal range of motion.      Cervical back: Normal range of motion.   Skin:     General: Skin is warm.   Neurological:      General: No focal deficit present.      Mental Status: He is alert and oriented to person, place, and time.   Psychiatric:         Mood and Affect: Mood normal.         Behavior: Behavior normal.         Thought Content: Thought content normal.         Judgment: Judgment normal.         IPSS Questionnaire (AUA-7):  IPSS Questionnaire (AUA-7):                  IPSS Questionnaire (AUA-7):  Over the past month…    1)  Incomplete Emptying  How often have you had a sensation of not emptying your bladder?  3 - About half the time   2)  Frequency  How often have you had to urinate less than every two hours? 2 - Less than half the time   3)  Intermittency  How often have you found you stopped and started again several times when you urinated?  3 - About half the time   4) Urgency  How often have you found it difficult to postpone urination?  2 - Less than half the time   5) Weak Stream  How often have you had a weak urinary stream?  3 - About half the time   6) Straining  How often have you had to push or strain to begin urination?  2 - Less than half the  time   7) Nocturia  How many times did you typically get up at night to urinate?  2 - 2 times   Total Score:  17   The International Prostate Symptom Score (IPSS) is used to screen, diagnose, track symptoms of benign prostatic hyperplasia (BPH).    0-7 pts (Mild Symptoms)  / 8-19 pts (Moderate) / 20-35 (Severe)    Quality of life due to urinary symptoms:  If you were to spend the rest of your life with your urinary condition the way it is now, how would you feel about that? 3-Mixed   Urine Leakage (Incontinence) 0-No Leakage       Recent Image (CT and/or KUB):   CT Abdomen and Pelvis: No results found for this or any previous visit.     CT Stone Protocol: No results found for this or any previous visit.     KUB: No results found for this or any previous visit.       Labs:  Brief Urine Lab Results  (Last result in the past 365 days)      Color   Clarity   Blood   Leuk Est   Nitrite   Protein   CREAT   Urine HCG        02/21/23 1015 Yellow   Clear   Negative   Negative   Negative   Negative               Office Visit on 02/21/2023   Component Date Value Ref Range Status   • Color 02/21/2023 Yellow  Yellow, Straw, Dark Yellow, Evita Final   • Clarity, UA 02/21/2023 Clear  Clear Final   • Specific Gravity  02/21/2023 1.025  1.005 - 1.030 Final   • pH, Urine 02/21/2023 6.0  5.0 - 8.0 Final   • Leukocytes 02/21/2023 Negative  Negative Final   • Nitrite, UA 02/21/2023 Negative  Negative Final   • Protein, POC 02/21/2023 Negative  Negative mg/dL Final   • Glucose, UA 02/21/2023 Negative  Negative mg/dL Final   • Ketones, UA 02/21/2023 Negative  Negative Final   • Urobilinogen, UA 02/21/2023 Normal  Normal, 0.2 E.U./dL Final   • Bilirubin 02/21/2023 1 mg/dL (A)  Negative Final   • Blood, UA 02/21/2023 Negative  Negative Final   • Lot Number 02/21/2023 n   Final   • Expiration Date 02/21/2023 n   Final        Procedure: None  Procedures     I have reviewed and agree with the above PMH, PSH, FMH, social history, medications,  allergies, and labs.     Assessment/Plan:   Problem List Items Addressed This Visit    None  Visit Diagnoses     Benign prostatic hyperplasia with urinary hesitancy    -  Primary    Relevant Medications    tamsulosin (FLOMAX) 0.4 MG capsule 24 hr capsule    Other Relevant Orders    PSA Diagnostic    Gastroesophageal reflux disease without esophagitis        Relevant Orders    Ambulatory Referral to Gastroenterology    Frequency of urination        Relevant Orders    POC Urinalysis Dipstick, Automated (Completed)    Nausea without vomiting        Relevant Orders    Ambulatory Referral to Gastroenterology          Health Maintenance:   Health Maintenance Due   Topic Date Due   • COLORECTAL CANCER SCREENING  Never done   • TDAP/TD VACCINES (1 - Tdap) Never done   • HEPATITIS C SCREENING  Never done   • ANNUAL WELLNESS VISIT  Never done   • COVID-19 Vaccine (2 - Moderna series) 03/31/2021        Smoking Counseling: Patient is never smoked or use smokeless tobacco.    Urine Incontinence: Patient reports that he is not currently experiencing any symptoms of urinary incontinence.    Patient was given instructions and counseling regarding his condition or for health maintenance advice. Please see specific information pulled into the AVS if appropriate.    Patient Education:   Benign Prostate Hypertrophy (BPH): Discussed the pathophysiology of BPH and obstruction.  We discussed the static and dynamic effects of BPH as well as using 5 alpha reductase inhibitors versus alpha blockade.  We discussed the indications for transurethral surgery as well and/ or other therapeutic options available including all of the newer techniques.  This time recommend a trial of Flomax once nightly.  Discussed the risk and side effects of this medication.  Vies patient to take as directed.  Advised him to discontinue medication if he begins to experience an lightheadedness, dizziness, blurry vision, shortness of breath, syncope, or chest  pain.  PSA testing - I am recommending a prostate specific antigen blood test or PSA.  I discussed the pathophysiology of PSA testing indicating its use in the diagnosis and management of prostate cancer.  I discussed the normal range being 0 to 4, but more appropriately being much closer to 0 to 2 in a normal male.  I discussed the fact that after a certain age it is against recommendation to use PSA testing especially in view of numerous comorbidities.  I discussed many of the things that can artificially raise PSA including put not limited to a recent infection, urinary tract infection, recent sexual intercourse, or even the type of movement such as manipulation of the prostate from riding a bicycle.  It was discussed that the most important use of PSA is the velocity measurement.  This refers to the change of PSA with time. I discussed that we look for greater than 20% rise over a year to help us make the prediction of prostate cancer.  I also discussed that in the case of prostate cancer indicating a radical prostatectomy, the PSA should be 0 and any rise indicates an early biochemical recurrence.  GERD/nausea -patient has a medical history significant for GERD for which he takes famotidine 20 mg daily.  He also complains of nausea and is concerned for possible esophageal problems given his prior history of stricture.  He is interested referral to GI for further work-up.  I will refer him to Magalis Hurt PA-C for further work-up.  I will follow-up with the patient in 1 month for reevaluation of symptoms.  Patient verbalized understanding and agrees to plan of care.    Visit Diagnoses:    ICD-10-CM ICD-9-CM   1. Benign prostatic hyperplasia with urinary hesitancy  N40.1 600.01    R39.11 788.64   2. Gastroesophageal reflux disease without esophagitis  K21.9 530.81   3. Frequency of urination  R35.0 788.41   4. Nausea without vomiting  R11.0 787.02       Meds Ordered During Visit:  New Medications Ordered This  Visit   Medications   • tamsulosin (FLOMAX) 0.4 MG capsule 24 hr capsule     Sig: Take 1 capsule by mouth Daily.     Dispense:  30 capsule     Refill:  0       Follow Up Appointment: 1 month  No follow-ups on file.      This document has been electronically signed by Joe Holman PA-C   February 21, 2023 11:36 EST    Part of this note may be an electronic transcription/translation of spoken language to printed text using the Dragon Dictation System.

## 2023-03-22 ENCOUNTER — OFFICE VISIT (OUTPATIENT)
Dept: UROLOGY | Facility: CLINIC | Age: 53
End: 2023-03-22
Payer: MEDICARE

## 2023-03-22 ENCOUNTER — TELEPHONE (OUTPATIENT)
Dept: UROLOGY | Facility: CLINIC | Age: 53
End: 2023-03-22
Payer: MEDICARE

## 2023-03-22 VITALS — WEIGHT: 197 LBS | BODY MASS INDEX: 27.58 KG/M2 | HEIGHT: 71 IN

## 2023-03-22 DIAGNOSIS — R39.11 BENIGN PROSTATIC HYPERPLASIA WITH URINARY HESITANCY: Primary | ICD-10-CM

## 2023-03-22 DIAGNOSIS — R30.0 DYSURIA: ICD-10-CM

## 2023-03-22 DIAGNOSIS — N40.1 BENIGN PROSTATIC HYPERPLASIA WITH URINARY HESITANCY: Primary | ICD-10-CM

## 2023-03-22 LAB
BILIRUB BLD-MCNC: ABNORMAL MG/DL
CLARITY, POC: CLEAR
COLOR UR: YELLOW
EXPIRATION DATE: ABNORMAL
GLUCOSE UR STRIP-MCNC: NEGATIVE MG/DL
KETONES UR QL: NEGATIVE
LEUKOCYTE EST, POC: ABNORMAL
Lab: ABNORMAL
NITRITE UR-MCNC: NEGATIVE MG/ML
PH UR: 6 [PH] (ref 5–8)
PROT UR STRIP-MCNC: ABNORMAL MG/DL
RBC # UR STRIP: NEGATIVE /UL
SP GR UR: 1.02 (ref 1–1.03)
UROBILINOGEN UR QL: NORMAL

## 2023-03-22 PROCEDURE — 87086 URINE CULTURE/COLONY COUNT: CPT

## 2023-03-22 PROCEDURE — 1159F MED LIST DOCD IN RCRD: CPT

## 2023-03-22 PROCEDURE — 1160F RVW MEDS BY RX/DR IN RCRD: CPT

## 2023-03-22 PROCEDURE — 81003 URINALYSIS AUTO W/O SCOPE: CPT

## 2023-03-22 PROCEDURE — 99213 OFFICE O/P EST LOW 20 MIN: CPT

## 2023-03-22 RX ORDER — NITROFURANTOIN 25; 75 MG/1; MG/1
100 CAPSULE ORAL 2 TIMES DAILY
Qty: 10 CAPSULE | Refills: 0 | Status: SHIPPED | OUTPATIENT
Start: 2023-03-22 | End: 2023-03-30

## 2023-03-22 RX ORDER — TADALAFIL 5 MG/1
5 TABLET ORAL DAILY
Qty: 30 TABLET | Refills: 0 | Status: SHIPPED | OUTPATIENT
Start: 2023-03-22 | End: 2023-03-23

## 2023-03-22 NOTE — PROGRESS NOTES
"Chief Complaint:    Chief Complaint   Patient presents with   • Benign prostatic hyperplasia with urinary hesitancy     1 month follow up       Vital Signs:   Ht 180.3 cm (70.98\")   Wt 89.4 kg (197 lb)   BMI 27.49 kg/m²   Body mass index is 27.49 kg/m².      HPI:  Reji Linares is a 52 y.o. male who presents today for follow up    History of Present Illness  Mr. Linares presents to the clinic for a 1 month follow-up.  I started the patient on a trial of Flomax for lower urinary tract symptoms secondary to BPH.  Patient reports that he was doing okay but began to notice significant burning with urination.  States that he started taking Azo over-the-counter with some improvement.  He eventually stopped taking Flomax and has had no burning since.  UA today shows trace leukocytes with no other signs of infection or microscopic blood.  He denies any fever, chills, dyspareunia, gross hematuria, hematospermia, perineum pain, pain with sitting, or pain with defecation.  Digital rectal exam at last office visit was normal.  His PSA came back normal at roughly 0.7.  I will send his urine off for culture analysis.  I will start him on a short 5-day course of Macrobid and give him a trial of doxazosin once daily.      Past Medical History:  Past Medical History:   Diagnosis Date   • Headache    • Low back pain    • Sleep apnea        Current Meds:  Current Outpatient Medications   Medication Sig Dispense Refill   • albuterol sulfate  (90 Base) MCG/ACT inhaler Inhale 2 puffs Every 4 (Four) Hours As Needed for Wheezing. 18 g 8   • baclofen (LIORESAL) 10 MG tablet Take 1 tablet by mouth 2 (Two) Times a Day.     • benzonatate (TESSALON) 100 MG capsule Take 1 capsule by mouth 3 (Three) Times a Day As Needed for Cough. 42 capsule 6   • budesonide-formoterol (Symbicort) 160-4.5 MCG/ACT inhaler Inhale 2 puffs 2 (Two) Times a Day. 1 each 8   • busPIRone (BUSPAR) 30 MG tablet Take 1 tablet by mouth.     • celecoxib (CeleBREX) " 200 MG capsule Take 1 capsule by mouth 2 (Two) Times a Day. 60 capsule 1   • clonazePAM (KlonoPIN) 0.5 MG tablet Take 1 tablet by mouth 2 (Two) Times a Day As Needed.     • DULoxetine (CYMBALTA) 60 MG capsule Take 1 capsule by mouth 2 (Two) Times a Day.     • famotidine (PEPCID) 20 MG tablet Take 1 tablet by mouth Daily.     • gabapentin (NEURONTIN) 600 MG tablet Take 1 tablet by mouth 3 (Three) Times a Day.     • HYDROcodone-acetaminophen (NORCO)  MG per tablet Take 1 tablet by mouth 3 (Three) Times a Day.     • hydrOXYzine (ATARAX) 50 MG tablet Take 1 tablet by mouth 2 (Two) Times a Day.     • influenza vac split quad (Flulaval Quadrivalent) 0.5 ML suspension prefilled syringe injection Inject  into the appropriate muscle as directed by prescriber. 0.5 mL 0   • methocarbamol (ROBAXIN) 750 MG tablet Take 1 tablet by mouth 4 (Four) Times a Day As Needed for Muscle Spasms.     • montelukast (SINGULAIR) 10 MG tablet TAKE 1 TABLET BY MOUTH EVERY NIGHT 30 tablet 5   • Pneumococcal 20-Mica Conj Vacc (Prevnar 20) 0.5 ML suspension prefilled syringe vaccine Inject 0.5 mL into the appropriate muscle as directed by prescriber. 0.5 mL 0   • propranolol (INDERAL) 20 MG tablet Take 2 tablets by mouth 2 (Two) Times a Day.     • QUEtiapine (SEROquel) 100 MG tablet Take 1 tablet by mouth Every Night.     • rOPINIRole (REQUIP) 4 MG tablet Take 1 tablet by mouth Every Night.     • Tezepelumab-ekko (Tezspire) 210 MG/1.91ML injection Inject 210 mg under the skin into the appropriate area as directed Every 28 (Twenty-Eight) Days. 1.91 mL 5   • Tiotropium Bromide Monohydrate (Spiriva Respimat) 1.25 MCG/ACT aerosol solution inhaler Inhale 2 puffs Daily. 4 g 8   • Zoster Vac Recomb Adjuvanted (Shingrix) 50 MCG/0.5ML reconstituted suspension Inject 0.5 mL into the appropriate muscle as directed by prescriber. 0.5 mL 0   • busPIRone (BUSPAR) 30 MG tablet Take 30 mg by mouth 3 (Three) Times a Day.     • nitrofurantoin,  macrocrystal-monohydrate, (Macrobid) 100 MG capsule Take 1 capsule by mouth 2 (Two) Times a Day. 10 capsule 0   • tadalafil (CIALIS) 5 MG tablet Take 1 tablet by mouth Daily. 30 tablet 0     No current facility-administered medications for this visit.        Allergies:   No Known Allergies     Past Surgical History:  Past Surgical History:   Procedure Laterality Date   • NV MYELOGRAPHY VIA LUMBAR INJECTION RS&I CERVICAL N/A 10/29/2019    Procedure: IR myelogram, cervical;  Surgeon: Jose Daniel Kirby MD;  Location: Located within [facility] INVASIVE LOCATION;  Service: Interventional Radiology       Social History:  Social History     Socioeconomic History   • Marital status:    Tobacco Use   • Smoking status: Never   • Smokeless tobacco: Never   Vaping Use   • Vaping Use: Never used   Substance and Sexual Activity   • Alcohol use: No   • Drug use: No   • Sexual activity: Defer       Family History:  Family History   Problem Relation Age of Onset   • Heart disease Mother    • Arthritis Mother    • Diabetes Mother    • Heart disease Father    • Arthritis Father        Review of Systems:  Review of Systems   Constitutional: Positive for fatigue. Negative for chills, fever and unexpected weight change.   HENT: Negative for congestion and sinus pressure.    Respiratory: Negative for chest tightness and shortness of breath.    Cardiovascular: Negative for chest pain.   Gastrointestinal: Positive for abdominal distention and abdominal pain. Negative for constipation, diarrhea, nausea and vomiting.   Genitourinary: Positive for difficulty urinating and urgency. Negative for dysuria, flank pain, frequency, hematuria, penile pain and scrotal swelling.   Musculoskeletal: Negative for back pain and neck pain.   Skin: Negative for rash.   Neurological: Negative for dizziness and headaches.   Hematological: Bruises/bleeds easily.   Psychiatric/Behavioral: Negative for confusion and suicidal ideas. The patient is not nervous/anxious.         Physical Exam:  Physical Exam  Constitutional:       General: He is not in acute distress.     Appearance: Normal appearance.   HENT:      Head: Normocephalic and atraumatic.      Nose: Nose normal.      Mouth/Throat:      Mouth: Mucous membranes are moist.   Eyes:      Conjunctiva/sclera: Conjunctivae normal.   Cardiovascular:      Rate and Rhythm: Normal rate and regular rhythm.      Pulses: Normal pulses.      Heart sounds: Normal heart sounds.   Pulmonary:      Effort: Pulmonary effort is normal.      Breath sounds: Normal breath sounds.   Abdominal:      General: Bowel sounds are normal.      Palpations: Abdomen is soft.      Tenderness: There is no right CVA tenderness or left CVA tenderness.   Musculoskeletal:         General: Normal range of motion.      Cervical back: Normal range of motion.   Skin:     General: Skin is warm.   Neurological:      General: No focal deficit present.      Mental Status: He is alert and oriented to person, place, and time.   Psychiatric:         Mood and Affect: Mood normal.         Behavior: Behavior normal.         Thought Content: Thought content normal.         Judgment: Judgment normal.         IPSS Questionnaire (AUA-7):  IPSS Questionnaire (AUA-7):                  IPSS Questionnaire (AUA-7):  Over the past month…    1)  Incomplete Emptying  How often have you had a sensation of not emptying your bladder?  0 - Not at all   2)  Frequency  How often have you had to urinate less than every two hours? 0 - Not at all   3)  Intermittency  How often have you found you stopped and started again several times when you urinated?  0 - Not at all   4) Urgency  How often have you found it difficult to postpone urination?  0 - Not at all   5) Weak Stream  How often have you had a weak urinary stream?  5 - Almost always   6) Straining  How often have you had to push or strain to begin urination?  2 - Less than half the time   7) Nocturia  How many times did you typically get up  at night to urinate?  1 - 1 time   Total Score:  8   The International Prostate Symptom Score (IPSS) is used to screen, diagnose, track symptoms of benign prostatic hyperplasia (BPH).    0-7 pts (Mild Symptoms)  / 8-19 pts (Moderate) / 20-35 (Severe)    Quality of life due to urinary symptoms:  If you were to spend the rest of your life with your urinary condition the way it is now, how would you feel about that? 3-Mixed   Urine Leakage (Incontinence) 0-No Leakage       Recent Image (CT and/or KUB):   CT Abdomen and Pelvis: No results found for this or any previous visit.     CT Stone Protocol: No results found for this or any previous visit.     KUB: No results found for this or any previous visit.       Labs:  Brief Urine Lab Results  (Last result in the past 365 days)      Color   Clarity   Blood   Leuk Est   Nitrite   Protein   CREAT   Urine HCG        03/22/23 0948 Yellow   Clear   Negative   Trace   Negative   Trace               Office Visit on 03/22/2023   Component Date Value Ref Range Status   • Color 03/22/2023 Yellow  Yellow, Straw, Dark Yellow, Evita Final   • Clarity, UA 03/22/2023 Clear  Clear Final   • Specific Gravity  03/22/2023 1.020  1.005 - 1.030 Final   • pH, Urine 03/22/2023 6.0  5.0 - 8.0 Final   • Leukocytes 03/22/2023 Trace (A)  Negative Final   • Nitrite, UA 03/22/2023 Negative  Negative Final   • Protein, POC 03/22/2023 Trace (A)  Negative mg/dL Final   • Glucose, UA 03/22/2023 Negative  Negative mg/dL Final   • Ketones, UA 03/22/2023 Negative  Negative Final   • Urobilinogen, UA 03/22/2023 Normal  Normal, 0.2 E.U./dL Final   • Bilirubin 03/22/2023 Small (1+) (A)  Negative Final   • Blood, UA 03/22/2023 Negative  Negative Final   • Lot Number 03/22/2023 [id number]   Final   • Expiration Date 03/22/2023 2/8/24   Final   Office Visit on 02/21/2023   Component Date Value Ref Range Status   • Color 02/21/2023 Yellow  Yellow, Straw, Dark Yellow, Evita Final   • Clarity, UA 02/21/2023 Clear   Clear Final   • Specific Gravity  02/21/2023 1.025  1.005 - 1.030 Final   • pH, Urine 02/21/2023 6.0  5.0 - 8.0 Final   • Leukocytes 02/21/2023 Negative  Negative Final   • Nitrite, UA 02/21/2023 Negative  Negative Final   • Protein, POC 02/21/2023 Negative  Negative mg/dL Final   • Glucose, UA 02/21/2023 Negative  Negative mg/dL Final   • Ketones, UA 02/21/2023 Negative  Negative Final   • Urobilinogen, UA 02/21/2023 Normal  Normal, 0.2 E.U./dL Final   • Bilirubin 02/21/2023 1 mg/dL (A)  Negative Final   • Blood, UA 02/21/2023 Negative  Negative Final   • Lot Number 02/21/2023 n   Final   • Expiration Date 02/21/2023 n   Final   • PSA 02/21/2023 0.738  0.000 - 4.000 ng/mL Final        Procedure: None  Procedures     I have reviewed and agree with the above PMH, PSH, FMH, social history, medications, allergies, and labs.     Assessment/Plan:   Problem List Items Addressed This Visit        Genitourinary and Reproductive     Benign prostatic hyperplasia with urinary hesitancy - Primary    Relevant Medications    tadalafil (CIALIS) 5 MG tablet    Other Relevant Orders    POC Urinalysis Dipstick, Automated (Completed)    Urine Culture - Urine, Urine, Clean Catch   Other Visit Diagnoses     Dysuria        Relevant Medications    nitrofurantoin, macrocrystal-monohydrate, (Macrobid) 100 MG capsule          Health Maintenance:   Health Maintenance Due   Topic Date Due   • COLORECTAL CANCER SCREENING  Never done   • TDAP/TD VACCINES (1 - Tdap) Never done   • HEPATITIS C SCREENING  Never done   • ANNUAL WELLNESS VISIT  Never done   • COVID-19 Vaccine (2 - Moderna series) 03/31/2021        Smoking Counseling: Never smoked or used smokeless tobacco    Urine Incontinence: Patient reports that he is not currently experiencing any symptoms of urinary incontinence.    Patient was given instructions and counseling regarding his condition or for health maintenance advice. Please see specific information pulled into the AVS if  appropriate.    Patient Education:   Benign Prostate Hypertrophy (BPH): Discussed the pathophysiology of BPH and obstruction.  We discussed the static and dynamic effects of BPH as well as using 5 alpha reductase inhibitors versus alpha blockade.  We discussed the indications for transurethral surgery as well and/ or other therapeutic options available including all of the newer techniques.  Patient was taking Flomax but had significant side effects so discontinued it.  At this time and recommended trial of doxazosin once daily.  Discussed the risk and side effects of this medication.  Advised patient to call if he has any concerns would like to switch medication.  Patient verbalized understanding.  PSA testing -last PSA was normal at 0.7. I discussed the pathophysiology of PSA testing indicating its use in the diagnosis and management of prostate cancer.  I discussed the normal range being 0 to 4, but more appropriately being much closer to 0 to 2 in a normal male.  I discussed the fact that after a certain age it is against recommendation to use PSA testing especially in view of numerous comorbidities.  I discussed many of the things that can artificially raise PSA including put not limited to a recent infection, urinary tract infection, recent sexual intercourse, or even the type of movement such as manipulation of the prostate from riding a bicycle.  It was discussed that the most important use of PSA is the velocity measurement.  This refers to the change of PSA with time. I discussed that we look for greater than 20% rise over a year to help us make the prediction of prostate cancer.  I also discussed that in the case of prostate cancer indicating a radical prostatectomy, the PSA should be 0 and any rise indicates an early biochemical recurrence.    Dysuria given that the patient is having some dysuria I will start him on a short course of Macrobid twice daily for 5 days.  I will send his urine off for culture  analysis.  I will call him if culture results are positive.  Advised patient to use warm soaks and compresses twice daily in the perineum area.  Otherwise I will follow-up with him in 1 month for reevaluation of symptoms.  Patient verbalized understanding agrees to plan of care.    Visit Diagnoses:    ICD-10-CM ICD-9-CM   1. Benign prostatic hyperplasia with urinary hesitancy  N40.1 600.01    R39.11 788.64   2. Dysuria  R30.0 788.1       Meds Ordered During Visit:  New Medications Ordered This Visit   Medications   • nitrofurantoin, macrocrystal-monohydrate, (Macrobid) 100 MG capsule     Sig: Take 1 capsule by mouth 2 (Two) Times a Day.     Dispense:  10 capsule     Refill:  0   • tadalafil (CIALIS) 5 MG tablet     Sig: Take 1 tablet by mouth Daily.     Dispense:  30 tablet     Refill:  0       Follow Up Appointment: 1 month  No follow-ups on file.      This document has been electronically signed by Joe Holman PA-C   March 22, 2023 10:35 EDT    Part of this note may be an electronic transcription/translation of spoken language to printed text using the Dragon Dictation System.

## 2023-03-22 NOTE — TELEPHONE ENCOUNTER
Prior Authorization for Tadalafil was sent to patient's insurance company. Currently waiting for a response.                 Sent to [facility]  Tadalafil 5MG tablets  Form  [employer] Part D Electronic Prior Authorization Form (2017 [employer])  Original Claim Info  [id number] Provide Notice: Medicare Prescription Drug Coverage and Your Rights

## 2023-03-23 ENCOUNTER — TELEPHONE (OUTPATIENT)
Dept: UROLOGY | Facility: CLINIC | Age: 53
End: 2023-03-23
Payer: MEDICARE

## 2023-03-23 LAB — BACTERIA SPEC AEROBE CULT: NORMAL

## 2023-03-23 RX ORDER — DOXAZOSIN MESYLATE 4 MG/1
2 TABLET ORAL NIGHTLY
Qty: 30 TABLET | Refills: 2 | Status: SHIPPED | OUTPATIENT
Start: 2023-03-23

## 2023-03-23 NOTE — TELEPHONE ENCOUNTER
The patient called to let us know that his insurance want cover the Cialis. He wanted to know if there is something else we can call him in.    He saw He but I accidentally put the phone call encounter under [place].

## 2023-03-30 ENCOUNTER — SPECIALTY PHARMACY (OUTPATIENT)
Dept: PHARMACY | Facility: HOSPITAL | Age: 53
End: 2023-03-30
Payer: MEDICARE

## 2023-03-30 RX ORDER — TEZEPELUMAB-EKKO 210 MG/1.9ML
210 INJECTION, SOLUTION SUBCUTANEOUS
Qty: 1.91 ML | Refills: 5 | Status: SHIPPED | OUTPATIENT
Start: 2023-03-30

## 2023-03-30 NOTE — PROGRESS NOTES
[facility]  Asthma Management       Reji Linares is a 52 y.o. male referred by Pulmonology to the [facility] for severe  asthma.  The patient's current asthma regimen includes: Symbicort, Spiriva and Singulair and Pt reports good adherence to oral medications. He has not had his Tezspire injection since November of 2022 due to cost issues and can tell the difference of not having this medication on board. He is now receiving this medication through the [facility] program as of 3/23 and it is being shipped to the Pulmonology office and he will start getting the injection at that office. Pt reports he is not a smoker or exposed to second hand smoke.     Reji Linares reports asthma kept them from getting as much done some of the time and having shortness of breath more than once a day.   Pt reports nighttime awakening 2-3 times/week due to asthma symptoms and using a rescue inhaler 1/2 times a day.      Pt self rates asthma control as Somewhat Controlled.            Past Medical History:   Diagnosis Date   • Headache    • Low back pain    • Sleep apnea      Social History     Socioeconomic History   • Marital status:    Tobacco Use   • Smoking status: Never   • Smokeless tobacco: Never   Vaping Use   • Vaping Use: Never used   Substance and Sexual Activity   • Alcohol use: No   • Drug use: No   • Sexual activity: Defer     Patient has no known allergies.    Current Outpatient Medications:   •  albuterol sulfate  (90 Base) MCG/ACT inhaler, Inhale 2 puffs Every 4 (Four) Hours As Needed for Wheezing., Disp: 18 g, Rfl: 8  •  baclofen (LIORESAL) 10 MG tablet, Take 1 tablet by mouth 2 (Two) Times a Day., Disp: , Rfl:   •  benzonatate (TESSALON) 100 MG capsule, Take 1 capsule by mouth 3 (Three) Times a Day As Needed for Cough., Disp: 42 capsule, Rfl: 6  •  budesonide-formoterol (Symbicort) 160-4.5 MCG/ACT inhaler, Inhale 2 puffs 2 (Two) Times a Day., Disp: 1  each, Rfl: 8  •  busPIRone (BUSPAR) 30 MG tablet, Take 1 tablet by mouth., Disp: , Rfl:   •  celecoxib (CeleBREX) 200 MG capsule, Take 1 capsule by mouth 2 (Two) Times a Day., Disp: 60 capsule, Rfl: 1  •  clonazePAM (KlonoPIN) 0.5 MG tablet, Take 1 tablet by mouth 2 (Two) Times a Day As Needed., Disp: , Rfl:   •  doxazosin (CARDURA) 4 MG tablet, Take 0.5 tablets by mouth Every Night., Disp: 30 tablet, Rfl: 2  •  DULoxetine (CYMBALTA) 60 MG capsule, Take 1 capsule by mouth 2 (Two) Times a Day., Disp: , Rfl:   •  famotidine (PEPCID) 20 MG tablet, Take 1 tablet by mouth Daily., Disp: , Rfl:   •  gabapentin (NEURONTIN) 600 MG tablet, Take 1 tablet by mouth 3 (Three) Times a Day., Disp: , Rfl:   •  HYDROcodone-acetaminophen (NORCO)  MG per tablet, Take 1 tablet by mouth 3 (Three) Times a Day., Disp: , Rfl:   •  hydrOXYzine (ATARAX) 50 MG tablet, Take 1 tablet by mouth 2 (Two) Times a Day., Disp: , Rfl:   •  methocarbamol (ROBAXIN) 750 MG tablet, Take 1 tablet by mouth 4 (Four) Times a Day As Needed for Muscle Spasms., Disp: , Rfl:   •  montelukast (SINGULAIR) 10 MG tablet, TAKE 1 TABLET BY MOUTH EVERY NIGHT, Disp: 30 tablet, Rfl: 5  •  propranolol (INDERAL) 20 MG tablet, Take 2 tablets by mouth 2 (Two) Times a Day., Disp: , Rfl:   •  QUEtiapine (SEROquel) 100 MG tablet, Take 1 tablet by mouth Every Night., Disp: , Rfl:   •  rOPINIRole (REQUIP) 4 MG tablet, Take 1 tablet by mouth Every Night., Disp: , Rfl:   •  Tezepelumab-ekko (Tezspire) 210 MG/1.91ML injection, Inject 210 mg under the skin into the appropriate area as directed Every 28 (Twenty-Eight) Days., Disp: 1.91 mL, Rfl: 5  •  Tiotropium Bromide Monohydrate (Spiriva Respimat) 1.25 MCG/ACT aerosol solution inhaler, Inhale 2 puffs Daily., Disp: 4 g, Rfl: 8  •  Zoster Vac Recomb Adjuvanted (Shingrix) 50 MCG/0.5ML reconstituted suspension, Inject 0.5 mL into the appropriate muscle as directed by prescriber., Disp: 0.5 mL, Rfl: 0        Asthma Control Test  Results:   0-15 Very Poorly Controlled Asthma   15-20 Poorly Controlled Asthma  20-25 Well Controlled Asthma     Date  8/26/22 3/30/23       ACT Results 15 11       ACT Results Very Poorly  Controlled Asthma  Very Poorly  Controlled Asthma            Vaccination Status   COVID 19: wants bivalent  Influenza: UTD  Pneumococcal: Needed: UTD  Zoster: Needed: UTD    Drug-Drug Interactions: None with Tezspire    Drug-Disease Interactions (non-cardioselective beta blockers, NSAIDs): Propranolol (for anxiety)     Patient Assistance: Not required    Inhaler Technique Observed? No  If yes, notes:     Treatment Goals: Risk Reduction and Symptom Control     Medication Assessment & Plan:     Patient will restart Tezspire 210 mg SubQ every 4 weeks for severe asthma given by Pulmonology MA. Advised Pt to avoid live vaccines while on this medication.     Advised Pt on the importance of continuing maintenance inhaler regimen and the importance of rinsing mouth after ICS use. This injection does not replace your maintenance inhaler and is not used to treat an asthma attack (use a rescue inhaler).     Recommended vaccinations: Bivalent booster for Covid.  He plans to get at our pharmacy on the date of his next injection.     Patient is on a non-selective beta blocker, propranolol.  Will inform provider.     Patient will continue regular follow-up with pulmonology. Patient will follow-up with specialty mail out services via [employer].  Will follow-up in 6 months, or sooner if needed.     Vicky Johnson RPH  3/30/2023  14:09 EDT

## 2023-04-20 ENCOUNTER — OFFICE VISIT (OUTPATIENT)
Dept: PULMONOLOGY | Facility: CLINIC | Age: 53
End: 2023-04-20
Payer: MEDICARE

## 2023-04-20 VITALS
HEART RATE: 68 BPM | WEIGHT: 197.5 LBS | RESPIRATION RATE: 18 BRPM | SYSTOLIC BLOOD PRESSURE: 110 MMHG | TEMPERATURE: 98 F | OXYGEN SATURATION: 98 % | DIASTOLIC BLOOD PRESSURE: 80 MMHG | BODY MASS INDEX: 27.65 KG/M2 | HEIGHT: 71 IN

## 2023-04-20 DIAGNOSIS — J45.50 SEVERE PERSISTENT ASTHMA WITHOUT COMPLICATION: ICD-10-CM

## 2023-04-20 DIAGNOSIS — G47.33 OBSTRUCTIVE SLEEP APNEA: Primary | ICD-10-CM

## 2023-04-20 RX ORDER — TIOTROPIUM BROMIDE INHALATION SPRAY 1.56 UG/1
2 SPRAY, METERED RESPIRATORY (INHALATION)
Qty: 4 G | Refills: 8 | Status: SHIPPED | OUTPATIENT
Start: 2023-04-20

## 2023-04-20 RX ORDER — DICLOFENAC SODIUM 75 MG/1
TABLET, DELAYED RELEASE ORAL
COMMUNITY
Start: 2023-03-28

## 2023-04-20 RX ORDER — ESOMEPRAZOLE MAGNESIUM 40 MG/1
CAPSULE, DELAYED RELEASE ORAL
COMMUNITY
Start: 2023-03-28

## 2023-04-20 RX ORDER — BENZONATATE 100 MG/1
100 CAPSULE ORAL 3 TIMES DAILY PRN
Qty: 42 CAPSULE | Refills: 6 | Status: SHIPPED | OUTPATIENT
Start: 2023-04-20

## 2023-04-20 RX ORDER — ALBUTEROL SULFATE 90 UG/1
2 AEROSOL, METERED RESPIRATORY (INHALATION) EVERY 4 HOURS PRN
Qty: 18 G | Refills: 8 | Status: SHIPPED | OUTPATIENT
Start: 2023-04-20

## 2023-04-20 RX ORDER — NALOXONE HYDROCHLORIDE 4 MG/.1ML
1 SPRAY NASAL
COMMUNITY
Start: 2023-04-13

## 2023-04-20 RX ORDER — OLANZAPINE 7.5 MG/1
TABLET ORAL
COMMUNITY

## 2023-04-20 RX ORDER — MONTELUKAST SODIUM 10 MG/1
10 TABLET ORAL NIGHTLY
Qty: 30 TABLET | Refills: 5 | Status: SHIPPED | OUTPATIENT
Start: 2023-04-20

## 2023-04-20 RX ORDER — BUDESONIDE AND FORMOTEROL FUMARATE DIHYDRATE 160; 4.5 UG/1; UG/1
2 AEROSOL RESPIRATORY (INHALATION)
Qty: 1 EACH | Refills: 8 | Status: SHIPPED | OUTPATIENT
Start: 2023-04-20

## 2023-04-20 NOTE — PROGRESS NOTES
"Chief Complaint  Asthma       Subjective        Reji Jean Linares presents to [facility] PULMONARY & CRITICAL CARE MEDICINE  History of Present Illness     Mr. Linares presents today for follow up of asthma. States that symptoms remain overall stable at this time on the current regimen. He still notes frequent coughing, which is relieved by use of the tessalon capsules PRN. Other symptoms including shortness of breath, wheezing remain stable.   Continues on Symbicort (using 1-2 puffs daily) and Spiriva respimat.   No previous smoking history, but notable for second hand exposure.   Remains compliant with autopap device. No acute complaints with use.       Objective   Vital Signs:  /80   Pulse 68   Temp 98 °F (36.7 °C) (Temporal)   Resp 18   Ht 180.3 cm (70.98\")   Wt 89.6 kg (197 lb 8 oz)   SpO2 98%   BMI 27.56 kg/m²   Estimated body mass index is 27.56 kg/m² as calculated from the following:    Height as of this encounter: 180.3 cm (70.98\").    Weight as of this encounter: 89.6 kg (197 lb 8 oz).         Physical Exam  Vitals reviewed.   Constitutional:       General: He is not in acute distress.     Appearance: He is well-developed. He is not diaphoretic.   HENT:      Head: Normocephalic and atraumatic.   Cardiovascular:      Rate and Rhythm: Normal rate and regular rhythm.      Heart sounds: Normal heart sounds, S1 normal and S2 normal.   Pulmonary:      Effort: Pulmonary effort is normal.      Breath sounds: No wheezing, rhonchi or rales.   Neurological:      Mental Status: He is alert and oriented to person, place, and time.   Psychiatric:         Behavior: Behavior normal.        Result Review :  The following data was reviewed by: Marielle Kunz PA-C on 04/20/2023:    Reviewed previous CT Chest.   Reviewed previous PFT      Assessment and Plan   Diagnoses and all orders for this visit:    1. Severe persistent asthma without complication  -     budesonide-formoterol (Symbicort) " 160-4.5 MCG/ACT inhaler; Inhale 2 puffs 2 (Two) Times a Day.  Dispense: 1 each; Refill: 8  -     albuterol sulfate  (90 Base) MCG/ACT inhaler; Inhale 2 puffs Every 4 (Four) Hours As Needed for Wheezing.  Dispense: 18 g; Refill: 8  -     montelukast (SINGULAIR) 10 MG tablet; Take 1 tablet by mouth Every Night.  Dispense: 30 tablet; Refill: 5  -     Tiotropium Bromide Monohydrate (Spiriva Respimat) 1.25 MCG/ACT aerosol solution inhaler; Inhale 2 puffs Daily.  Dispense: 4 g; Refill: 8  -     benzonatate (TESSALON) 100 MG capsule; Take 1 capsule by mouth 3 (Three) Times a Day As Needed for Cough.  Dispense: 42 capsule; Refill: 6  -     Tezepelumab-ekko (TEZSPIRE) injection 210 mg    2. Persistent dry cough        Severe persistent asthma:   · Continue albuterol inhaler as needed  · Continue symbicort 160 mcg (requiring less than the max dose of 4 puffs daily)  · Continue spiriva 1.25 mcg 1-2 puffs daily  · Continue singulair nightly  · Continue tezspire infection  · Continue tessalon capsules as needed for frequent, chronic cough (no other alleviating factors thus far.   · Previously completed CT imaging. As symptoms are stable, agreed that we will consider imaging int he future up symptomatic changes.       Obstructive sleep apnea:   · Still compliant with autopap device.     Management obstructive sleep apnea also includes lifestyle modifications including weight loss, avoidance of alcohol, sedated, caffeine especially before bedtime, allowing adequate sleep time, and body position during sleep such as side versus back. 10% weight loss can result in a 50% improvement of the apnea-hypopnea index.  Untreated sleep apnea can lead to cardiovascular/metabolic disorder, neurocognitive deficit, daytime sleepiness, motor vehicle accidents, depression, mood disorders and reduced quality of life.  Patient understands the risk of untreated obstructive sleep apnea and benefit benefits of the treatment. Recommended at least  4 hours of use per night for 70% of every month (approximately 21 out of 30 days) per CMS guidelines.         Follow Up   Return in about 6 months (around 10/20/2023), or if symptoms worsen or fail to improve, for Next scheduled follow up.  Patient was given instructions and counseling regarding his condition or for health maintenance advice. Please see specific information pulled into the AVS if appropriate.

## 2023-04-25 ENCOUNTER — OFFICE VISIT (OUTPATIENT)
Dept: UROLOGY | Facility: CLINIC | Age: 53
End: 2023-04-25
Payer: MEDICARE

## 2023-04-25 VITALS
HEIGHT: 71 IN | SYSTOLIC BLOOD PRESSURE: 107 MMHG | BODY MASS INDEX: 27.58 KG/M2 | DIASTOLIC BLOOD PRESSURE: 72 MMHG | WEIGHT: 197 LBS | HEART RATE: 68 BPM

## 2023-04-25 DIAGNOSIS — R39.11 BENIGN PROSTATIC HYPERPLASIA WITH URINARY HESITANCY: ICD-10-CM

## 2023-04-25 DIAGNOSIS — R30.0 DYSURIA: Primary | ICD-10-CM

## 2023-04-25 DIAGNOSIS — N40.1 BENIGN PROSTATIC HYPERPLASIA WITH URINARY HESITANCY: ICD-10-CM

## 2023-04-25 LAB
BILIRUB BLD-MCNC: ABNORMAL MG/DL
CLARITY, POC: CLEAR
COLOR UR: YELLOW
EXPIRATION DATE: ABNORMAL
GLUCOSE UR STRIP-MCNC: NEGATIVE MG/DL
KETONES UR QL: NEGATIVE
LEUKOCYTE EST, POC: NEGATIVE
Lab: ABNORMAL
NITRITE UR-MCNC: NEGATIVE MG/ML
PH UR: 5 [PH] (ref 5–8)
PROT UR STRIP-MCNC: NEGATIVE MG/DL
RBC # UR STRIP: NEGATIVE /UL
SP GR UR: 1.02 (ref 1–1.03)
UROBILINOGEN UR QL: NORMAL

## 2023-04-25 PROCEDURE — 1159F MED LIST DOCD IN RCRD: CPT

## 2023-04-25 PROCEDURE — 81003 URINALYSIS AUTO W/O SCOPE: CPT

## 2023-04-25 PROCEDURE — 99213 OFFICE O/P EST LOW 20 MIN: CPT

## 2023-04-25 PROCEDURE — 1160F RVW MEDS BY RX/DR IN RCRD: CPT

## 2023-04-25 NOTE — PROGRESS NOTES
"Chief Complaint:    Chief Complaint   Patient presents with   • Benign Prostatic Hypertrophy       Vital Signs:   /72   Pulse 68   Ht 180.3 cm (70.98\")   Wt 89.4 kg (197 lb)   BMI 27.49 kg/m²   Body mass index is 27.49 kg/m².      HPI:  Reji Linares is a 52 y.o. male who presents today for follow up    History of Present Illness  Mr. Linares presents to the clinic for follow-up for BPH.  He was started on doxazosin 2 mg once nightly with minimal improvement of symptoms.  States that he still has dysuria with urination roughly half the time.  His IPSS score remains unchanged.  States he gets up roughly 1-2 times throughout the night.  He does have a weak urinary stream with sensation of incomplete bladder emptying.  He is scheduled to follow-up Martinez Hurt PA-C on May 1, 2023.  He had a CT scan completed at the end of March that showed no significant abnormalities other than fatty liver.  His last urine culture only showed mixed kiko growth.  Today's UA still shows some trace white blood cells.  I will send this off to [facility] for a urine culture analysis.      Past Medical History:  Past Medical History:   Diagnosis Date   • Headache    • Low back pain    • Sleep apnea        Current Meds:  Current Outpatient Medications   Medication Sig Dispense Refill   • albuterol sulfate  (90 Base) MCG/ACT inhaler Inhale 2 puffs Every 4 (Four) Hours As Needed for Wheezing. 18 g 8   • baclofen (LIORESAL) 10 MG tablet Take 1 tablet by mouth 2 (Two) Times a Day.     • benzonatate (TESSALON) 100 MG capsule Take 1 capsule by mouth 3 (Three) Times a Day As Needed for Cough. 42 capsule 6   • budesonide-formoterol (Symbicort) 160-4.5 MCG/ACT inhaler Inhale 2 puffs 2 (Two) Times a Day. 1 each 8   • busPIRone (BUSPAR) 30 MG tablet Take 1 tablet by mouth Daily.     • celecoxib (CeleBREX) 200 MG capsule Take 1 capsule by mouth 2 (Two) Times a Day. 60 capsule 1   • clonazePAM (KlonoPIN) 0.5 MG tablet Take 1 tablet " by mouth 2 (Two) Times a Day As Needed.     • diclofenac (VOLTAREN) 75 MG EC tablet      • doxazosin (CARDURA) 4 MG tablet Take 0.5 tablets by mouth Every Night. 30 tablet 2   • DULoxetine (CYMBALTA) 60 MG capsule Take 1 capsule by mouth 2 (Two) Times a Day.     • esomeprazole (nexIUM) 40 MG capsule      • famotidine (PEPCID) 20 MG tablet Take 1 tablet by mouth Daily.     • gabapentin (NEURONTIN) 600 MG tablet Take 1 tablet by mouth 3 (Three) Times a Day.     • HYDROcodone-acetaminophen (NORCO)  MG per tablet Take 1 tablet by mouth 3 (Three) Times a Day.     • hydrOXYzine (ATARAX) 50 MG tablet Take 1 tablet by mouth 2 (Two) Times a Day.     • methocarbamol (ROBAXIN) 750 MG tablet Take 1 tablet by mouth 4 (Four) Times a Day As Needed for Muscle Spasms.     • montelukast (SINGULAIR) 10 MG tablet Take 1 tablet by mouth Every Night. 30 tablet 5   • naloxone (NARCAN) 4 MG/0.1ML nasal spray 1 spray into the nostril(s) as directed by provider.     • OLANZapine (zyPREXA) 7.5 MG tablet olanzapine 7.5 mg tablet     • propranolol (INDERAL) 20 MG tablet Take 2 tablets by mouth 2 (Two) Times a Day.     • QUEtiapine (SEROquel) 100 MG tablet Take 1 tablet by mouth Every Night.     • rOPINIRole (REQUIP) 4 MG tablet Take 1 tablet by mouth Every Night.     • Tezepelumab-ekko (Tezspire) 210 MG/1.91ML injection Inject 210 mg under the skin into the appropriate area as directed Every 28 (Twenty-Eight) Days. 1.91 mL 5   • Tiotropium Bromide Monohydrate (Spiriva Respimat) 1.25 MCG/ACT aerosol solution inhaler Inhale 2 puffs Daily. 4 g 8     No current facility-administered medications for this visit.        Allergies:   No Known Allergies     Past Surgical History:  Past Surgical History:   Procedure Laterality Date   • [place] MYELOGRAPHY VIA LUMBAR INJECTION RS&I CERVICAL N/A 10/29/2019    Procedure: IR myelogram, cervical;  Surgeon: Jose Daniel Kirby MD;  Location: [facility] INVASIVE LOCATION;  Service: Interventional Radiology        Social History:  Social History     Socioeconomic History   • Marital status:    Tobacco Use   • Smoking status: Never   • Smokeless tobacco: Never   Vaping Use   • Vaping Use: Never used   Substance and Sexual Activity   • Alcohol use: No   • Drug use: No   • Sexual activity: Defer       Family History:  Family History   Problem Relation Age of Onset   • Heart disease Mother    • Arthritis Mother    • Diabetes Mother    • Heart disease Father    • Arthritis Father        Review of Systems:  Review of Systems   Constitutional: Negative for chills, fatigue, fever and unexpected weight change.   HENT: Negative for congestion and sinus pressure.    Respiratory: Negative for chest tightness and shortness of breath.    Cardiovascular: Negative for chest pain.   Gastrointestinal: Negative for abdominal pain, constipation, diarrhea, nausea and vomiting.   Genitourinary: Positive for dysuria. Negative for difficulty urinating, flank pain, frequency, hematuria and urgency.   Musculoskeletal: Negative for back pain and neck pain.   Skin: Negative for rash.   Neurological: Negative for dizziness and headaches.   Hematological: Does not bruise/bleed easily.   Psychiatric/Behavioral: Negative for confusion and suicidal ideas. The patient is not nervous/anxious.        Physical Exam:  Physical Exam    IPSS Questionnaire (AUA-7):  IPSS Questionnaire (AUA-7):                  IPSS Questionnaire (AUA-7):  Over the past month…    1)  Incomplete Emptying  How often have you had a sensation of not emptying your bladder?  2 - Less than half the time   2)  Frequency  How often have you had to urinate less than every two hours? 0 - Not at all   3)  Intermittency  How often have you found you stopped and started again several times when you urinated?  2 - Less than half the time   4) Urgency  How often have you found it difficult to postpone urination?  1 - Less than 1 time in 5   5) Weak Stream  How often have you had a weak  urinary stream?  1 - Less than 1 time in 5   6) Straining  How often have you had to push or strain to begin urination?  1 - Less than 1 time in 5   7) Nocturia  How many times did you typically get up at night to urinate?  2 - 2 times   Total Score:  9   The International Prostate Symptom Score (IPSS) is used to screen, diagnose, track symptoms of benign prostatic hyperplasia (BPH).    0-7 pts (Mild Symptoms)  / 8-19 pts (Moderate) / 20-35 (Severe)    Quality of life due to urinary symptoms:  If you were to spend the rest of your life with your urinary condition the way it is now, how would you feel about that? 2-Mostly Satisfied and 3-Mixed   Urine Leakage (Incontinence) 0-No Leakage       Recent Image (CT and/or KUB):   CT Abdomen and Pelvis: No results found for this or any previous visit.     CT Stone Protocol: No results found for this or any previous visit.     KUB: No results found for this or any previous visit.       Labs:  Brief Urine Lab Results  (Last result in the past 365 days)      Color   Clarity   Blood   Leuk Est   Nitrite   Protein   CREAT   Urine HCG        04/25/23 0954 Yellow   Clear   Negative   Negative   Negative   Negative               Office Visit on 04/25/2023   Component Date Value Ref Range Status   • Color 04/25/2023 Yellow  Yellow, Straw, Dark Yellow, Evita Final   • Clarity, UA 04/25/2023 Clear  Clear Final   • Specific Gravity  04/25/2023 1.025  1.005 - 1.030 Final   • pH, Urine 04/25/2023 5.0  5.0 - 8.0 Final   • Leukocytes 04/25/2023 Negative  Negative Final   • Nitrite, UA 04/25/2023 Negative  Negative Final   • Protein, POC 04/25/2023 Negative  Negative mg/dL Final   • Glucose, UA 04/25/2023 Negative  Negative mg/dL Final   • Ketones, UA 04/25/2023 Negative  Negative Final   • Urobilinogen, UA 04/25/2023 Normal  Normal, 0.2 E.U./dL Final   • Bilirubin 04/25/2023 1 mg/dL (A)  Negative Final   • Blood, UA 04/25/2023 Negative  Negative Final   • Lot Number 04/25/2023 n   Final    • Expiration Date 04/25/2023 n   Final   Office Visit on 03/22/2023   Component Date Value Ref Range Status   • Color 03/22/2023 Yellow  Yellow, Straw, Dark Yellow, Evita Final-Edited   • Clarity, UA 03/22/2023 Clear  Clear Final-Edited   • Specific Gravity  03/22/2023 1.020  1.005 - 1.030 Final-Edited   • pH, Urine 03/22/2023 6.0  5.0 - 8.0 Final-Edited   • Leukocytes 03/22/2023 Trace (A)  Negative Final-Edited   • Nitrite, UA 03/22/2023 Negative  Negative Final-Edited   • Protein, POC 03/22/2023 Trace (A)  Negative mg/dL Final-Edited   • Glucose, UA 03/22/2023 Negative  Negative mg/dL Final-Edited   • Ketones, UA 03/22/2023 Negative  Negative Final-Edited   • Urobilinogen, UA 03/22/2023 Normal  Normal, 0.2 E.U./dL Final-Edited   • Bilirubin 03/22/2023 Small (1+) (A)  Negative Final-Edited   • Blood, UA 03/22/2023 Negative  Negative Final-Edited   • Lot Number 03/22/2023 [id number]   Final-Edited   • Expiration Date 03/22/2023 2/8/24   Final-Edited   • Urine Culture 03/22/2023 25,000 CFU/mL Mixed Hanna Isolated   Final   Office Visit on 02/21/2023   Component Date Value Ref Range Status   • Color 02/21/2023 Yellow  Yellow, Straw, Dark Yellow, Evita Final   • Clarity, UA 02/21/2023 Clear  Clear Final   • Specific Gravity  02/21/2023 1.025  1.005 - 1.030 Final   • pH, Urine 02/21/2023 6.0  5.0 - 8.0 Final   • Leukocytes 02/21/2023 Negative  Negative Final   • Nitrite, UA 02/21/2023 Negative  Negative Final   • Protein, POC 02/21/2023 Negative  Negative mg/dL Final   • Glucose, UA 02/21/2023 Negative  Negative mg/dL Final   • Ketones, UA 02/21/2023 Negative  Negative Final   • Urobilinogen, UA 02/21/2023 Normal  Normal, 0.2 E.U./dL Final   • Bilirubin 02/21/2023 1 mg/dL (A)  Negative Final   • Blood, UA 02/21/2023 Negative  Negative Final   • Lot Number 02/21/2023 n   Final   • Expiration Date 02/21/2023 n   Final   • PSA 02/21/2023 0.738  0.000 - 4.000 ng/mL Final        Procedure: None  Procedures     I have  reviewed and agree with the above PMH, PSH, FMH, social history, medications, allergies, and labs.     Assessment/Plan:   Problem List Items Addressed This Visit        Genitourinary and Reproductive     Benign prostatic hyperplasia with urinary hesitancy   Other Visit Diagnoses     Dysuria    -  Primary    Relevant Orders    POC Urinalysis Dipstick, Automated (Completed)          Health Maintenance:   Health Maintenance Due   Topic Date Due   • COLORECTAL CANCER SCREENING  Never done   • TDAP/TD VACCINES (1 - Tdap) Never done   • HEPATITIS C SCREENING  Never done   • ANNUAL WELLNESS VISIT  Never done   • COVID-19 Vaccine (2 - Moderna series) 03/31/2021        Smoking Counseling: Never smoked or use smokeless tobacco    Urine Incontinence: Patient reports that he is not currently experiencing any symptoms of urinary incontinence.    Patient was given instructions and counseling regarding his condition or for health maintenance advice. Please see specific information pulled into the AVS if appropriate.    Patient Education:   Dysuria -patient is having persistent dysuria.  Last urine culture showed mixed kiko growth only.  I will send this off for [facility] to confirm urinary culture results.  Benign Prostate Hypertrophy (BPH): Discussed the pathophysiology of BPH and obstruction.  We discussed the static and dynamic effects of BPH as well as using 5 alpha reductase inhibitors versus alpha blockade.  We discussed the indications for transurethral surgery as well and/ or other therapeutic options available including all of the newer techniques.  Patient was been on doxazosin 2 mg once nightly with minimal improvement in symptoms.  He is unable to take Flomax due to significant side effects.  Recommending to increase doxazosin to 4 mg daily.  Did advise patient if this is beneficial however does not meet satisfaction he can increase to 6 mg daily as well.  I will follow-up with him in a few months for reevaluation of  lower urinary tract symptoms.  Patient verbalized understanding agreed to plan of care.  PSA testing -last PSA was well within normal limits.    Visit Diagnoses:    ICD-10-CM ICD-9-CM   1. Dysuria  R30.0 788.1   2. Benign prostatic hyperplasia with urinary hesitancy  N40.1 600.01    R39.11 788.64       Meds Ordered During Visit:  No orders of the defined types were placed in this encounter.      Follow Up Appointment: 2 months  No follow-ups on file.      This document has been electronically signed by Joe Holman PA-C   April 25, 2023 13:25 EDT    Part of this note may be an electronic transcription/translation of spoken language to printed text using the Dragon Dictation System.

## 2023-04-25 NOTE — PROGRESS NOTES
Chief Complaint:    No chief complaint on file.      HPI:   52 y.o. male.    Past Medical History:     Past Medical History:   Diagnosis Date   • Headache    • Low back pain    • Sleep apnea        Current Meds:     Current Outpatient Medications   Medication Sig Dispense Refill   • albuterol sulfate  (90 Base) MCG/ACT inhaler Inhale 2 puffs Every 4 (Four) Hours As Needed for Wheezing. 18 g 8   • baclofen (LIORESAL) 10 MG tablet Take 1 tablet by mouth 2 (Two) Times a Day.     • benzonatate (TESSALON) 100 MG capsule Take 1 capsule by mouth 3 (Three) Times a Day As Needed for Cough. 42 capsule 6   • budesonide-formoterol (Symbicort) 160-4.5 MCG/ACT inhaler Inhale 2 puffs 2 (Two) Times a Day. 1 each 8   • busPIRone (BUSPAR) 30 MG tablet Take 1 tablet by mouth Daily.     • celecoxib (CeleBREX) 200 MG capsule Take 1 capsule by mouth 2 (Two) Times a Day. 60 capsule 1   • clonazePAM (KlonoPIN) 0.5 MG tablet Take 1 tablet by mouth 2 (Two) Times a Day As Needed.     • diclofenac (VOLTAREN) 75 MG EC tablet      • doxazosin (CARDURA) 4 MG tablet Take 0.5 tablets by mouth Every Night. 30 tablet 2   • DULoxetine (CYMBALTA) 60 MG capsule Take 1 capsule by mouth 2 (Two) Times a Day.     • esomeprazole (nexIUM) 40 MG capsule      • famotidine (PEPCID) 20 MG tablet Take 1 tablet by mouth Daily.     • gabapentin (NEURONTIN) 600 MG tablet Take 1 tablet by mouth 3 (Three) Times a Day.     • HYDROcodone-acetaminophen (NORCO)  MG per tablet Take 1 tablet by mouth 3 (Three) Times a Day.     • hydrOXYzine (ATARAX) 50 MG tablet Take 1 tablet by mouth 2 (Two) Times a Day.     • methocarbamol (ROBAXIN) 750 MG tablet Take 1 tablet by mouth 4 (Four) Times a Day As Needed for Muscle Spasms.     • montelukast (SINGULAIR) 10 MG tablet Take 1 tablet by mouth Every Night. 30 tablet 5   • naloxone (NARCAN) 4 MG/0.1ML nasal spray 1 spray into the nostril(s) as directed by provider.     • OLANZapine (zyPREXA) 7.5 MG tablet olanzapine 7.5  mg tablet     • propranolol (INDERAL) 20 MG tablet Take 2 tablets by mouth 2 (Two) Times a Day.     • QUEtiapine (SEROquel) 100 MG tablet Take 1 tablet by mouth Every Night.     • rOPINIRole (REQUIP) 4 MG tablet Take 1 tablet by mouth Every Night.     • Tezepelumab-ekko (Tezspire) 210 MG/1.91ML injection Inject 210 mg under the skin into the appropriate area as directed Every 28 (Twenty-Eight) Days. 1.91 mL 5   • Tiotropium Bromide Monohydrate (Spiriva Respimat) 1.25 MCG/ACT aerosol solution inhaler Inhale 2 puffs Daily. 4 g 8     No current facility-administered medications for this visit.        Allergies:      No Known Allergies     Past Surgical History:     Past Surgical History:   Procedure Laterality Date   • [place] MYELOGRAPHY VIA LUMBAR INJECTION RS&I CERVICAL N/A 10/29/2019    Procedure: IR myelogram, cervical;  Surgeon: Jose Daniel Kirby MD;  Location: [facility] INVASIVE LOCATION;  Service: Interventional Radiology       Social History:     Social History     Socioeconomic History   • Marital status:    Tobacco Use   • Smoking status: Never   • Smokeless tobacco: Never   Vaping Use   • Vaping Use: Never used   Substance and Sexual Activity   • Alcohol use: No   • Drug use: No   • Sexual activity: Defer       Family History:     Family History   Problem Relation Age of Onset   • Heart disease Mother    • Arthritis Mother    • Diabetes Mother    • Heart disease Father    • Arthritis Father        Review of Systems:     Review of Systems    Physical Exam:     Physical Exam    ***  Recent Image (CT and/or KUB):      CT Abdomen and Pelvis: No results found for this or any previous visit.       CT Stone Protocol: No results found for this or any previous visit.       KUB: No results found for this or any previous visit.       Labs (past 3 months):      Office Visit on 03/22/2023   Component Date Value Ref Range Status   • Color 03/22/2023 Yellow  Yellow, Straw, Dark Yellow, Evita Final   • Clarity, UA  03/22/2023 Clear  Clear Final   • Specific Gravity  03/22/2023 1.020  1.005 - 1.030 Final   • pH, Urine 03/22/2023 6.0  5.0 - 8.0 Final   • Leukocytes 03/22/2023 Trace (A)  Negative Final   • Nitrite, UA 03/22/2023 Negative  Negative Final   • Protein, POC 03/22/2023 Trace (A)  Negative mg/dL Final   • Glucose, UA 03/22/2023 Negative  Negative mg/dL Final   • Ketones, UA 03/22/2023 Negative  Negative Final   • Urobilinogen, UA 03/22/2023 Normal  Normal, 0.2 E.U./dL Final   • Bilirubin 03/22/2023 Small (1+) (A)  Negative Final   • Blood, UA 03/22/2023 Negative  Negative Final   • Lot Number 03/22/2023 [id number]   Final   • Expiration Date 03/22/2023 2/8/24   Final   • Urine Culture 03/22/2023 25,000 CFU/mL Mixed Hanna Isolated   Final   Office Visit on 02/21/2023   Component Date Value Ref Range Status   • Color 02/21/2023 Yellow  Yellow, Straw, Dark Yellow, Evita Final   • Clarity, UA 02/21/2023 Clear  Clear Final   • Specific Gravity  02/21/2023 1.025  1.005 - 1.030 Final   • pH, Urine 02/21/2023 6.0  5.0 - 8.0 Final   • Leukocytes 02/21/2023 Negative  Negative Final   • Nitrite, UA 02/21/2023 Negative  Negative Final   • Protein, POC 02/21/2023 Negative  Negative mg/dL Final   • Glucose, UA 02/21/2023 Negative  Negative mg/dL Final   • Ketones, UA 02/21/2023 Negative  Negative Final   • Urobilinogen, UA 02/21/2023 Normal  Normal, 0.2 E.U./dL Final   • Bilirubin 02/21/2023 1 mg/dL (A)  Negative Final   • Blood, UA 02/21/2023 Negative  Negative Final   • Lot Number 02/21/2023 n   Final   • Expiration Date 02/21/2023 n   Final   • PSA 02/21/2023 0.738  0.000 - 4.000 ng/mL Final        Procedure:       Assessment/Plan:   ***     Patient reports that he is not currently experiencing any symptoms of urinary incontinence.      {BMI is >= 25 and <30. (Overweight) The following options were offered after discussion;:[id number]}              This document has been electronically signed by HATTIE JUDD MD April  25, 2023 09:36 EDT    Dictated Utilizing Dragon Dictation: Part of this note may be an electronic transcription/translation of spoken language to printed text using the Dragon Dictation System.

## 2023-05-01 ENCOUNTER — OFFICE VISIT (OUTPATIENT)
Dept: GASTROENTEROLOGY | Facility: CLINIC | Age: 53
End: 2023-05-01
Payer: MEDICARE

## 2023-05-01 VITALS — BODY MASS INDEX: 27.69 KG/M2 | HEIGHT: 71 IN | WEIGHT: 197.8 LBS

## 2023-05-01 DIAGNOSIS — K21.9 GASTROESOPHAGEAL REFLUX DISEASE WITHOUT ESOPHAGITIS: Primary | ICD-10-CM

## 2023-05-01 RX ORDER — DEXLANSOPRAZOLE 60 MG/1
60 CAPSULE, DELAYED RELEASE ORAL
Qty: 90 CAPSULE | Refills: 3 | Status: SHIPPED | OUTPATIENT
Start: 2023-05-01

## 2023-05-01 NOTE — PROGRESS NOTES
DATE OF CONSULTATION:  5/1/2023    REFERRING PHYSICIAN:  No ref. provider found    CHIEF COMPLAINT:  Chief Complaint   Patient presents with   • GERD       HISTORY OF PRESENT ILLNESS:   Reji Linares is a very pleasant 52 y.o. male who is being seen today at the request of No ref. provider found for evaluation and treatment of GERD.  Patient states that his issues with heartburn/reflux has been an ongoing issue.  He is currently taking Nexium 40 mg once daily and Pepcid 20 mg once nightly.  Patient states he deals with a lot of upset stomach and nausea.  He tends to do well in the morning time however as the day goes on symptoms seem to last.  He deals with a lot of upset stomach and nausea however denies any vomiting episodes.  He recently underwent an EGD/colonoscopy roughly 1 year ago with Dr. Dai which she states both were normal.  He has been on his current medication regimen for some time    REVIEW OF SYSTEMS:   Review of Systems   Constitutional: Negative for fever.   HENT: Negative for trouble swallowing.    Eyes: Negative.    Respiratory: Negative.    Cardiovascular: Negative.    Gastrointestinal: Positive for nausea. Negative for abdominal distention, abdominal pain, anal bleeding, blood in stool, constipation, diarrhea and vomiting.   Endocrine: Negative.    Genitourinary: Negative.    Musculoskeletal: Negative.    Skin: Negative.    Allergic/Immunologic: Negative.    Neurological: Negative.    Hematological: Negative.    Psychiatric/Behavioral: Negative.        PAST MEDICAL HISTORY:  Past Medical History:   Diagnosis Date   • Headache    • Low back pain    • Sleep apnea        PAST SURGICAL HISTORY:  Past Surgical History:   Procedure Laterality Date   • [place] MYELOGRAPHY VIA LUMBAR INJECTION RS&I CERVICAL N/A 10/29/2019    Procedure: IR myelogram, cervical;  Surgeon: Jose Daniel Kirby MD;  Location: [facility] INVASIVE LOCATION;  Service: Interventional Radiology       FAMILY HISTORY:  Family  History   Problem Relation Age of Onset   • Heart disease Mother    • Arthritis Mother    • Diabetes Mother    • Heart disease Father    • Arthritis Father        SOCIAL HISTORY:  Social History     Socioeconomic History   • Marital status:    Tobacco Use   • Smoking status: Never   • Smokeless tobacco: Never   Vaping Use   • Vaping Use: Never used   Substance and Sexual Activity   • Alcohol use: No   • Drug use: No   • Sexual activity: Defer       MEDICATIONS:      Current Outpatient Medications:   •  albuterol sulfate  (90 Base) MCG/ACT inhaler, Inhale 2 puffs Every 4 (Four) Hours As Needed for Wheezing., Disp: 18 g, Rfl: 8  •  baclofen (LIORESAL) 10 MG tablet, Take 1 tablet by mouth 2 (Two) Times a Day., Disp: , Rfl:   •  benzonatate (TESSALON) 100 MG capsule, Take 1 capsule by mouth 3 (Three) Times a Day As Needed for Cough., Disp: 42 capsule, Rfl: 6  •  budesonide-formoterol (Symbicort) 160-4.5 MCG/ACT inhaler, Inhale 2 puffs 2 (Two) Times a Day., Disp: 1 each, Rfl: 8  •  busPIRone (BUSPAR) 30 MG tablet, Take 1 tablet by mouth Daily., Disp: , Rfl:   •  celecoxib (CeleBREX) 200 MG capsule, Take 1 capsule by mouth 2 (Two) Times a Day., Disp: 60 capsule, Rfl: 1  •  clonazePAM (KlonoPIN) 0.5 MG tablet, Take 1 tablet by mouth 2 (Two) Times a Day As Needed., Disp: , Rfl:   •  diclofenac (VOLTAREN) 75 MG EC tablet, , Disp: , Rfl:   •  doxazosin (CARDURA) 4 MG tablet, Take 0.5 tablets by mouth Every Night., Disp: 30 tablet, Rfl: 2  •  DULoxetine (CYMBALTA) 60 MG capsule, Take 1 capsule by mouth 2 (Two) Times a Day., Disp: , Rfl:   •  famotidine (PEPCID) 20 MG tablet, Take 1 tablet by mouth Daily., Disp: , Rfl:   •  gabapentin (NEURONTIN) 600 MG tablet, Take 1 tablet by mouth 3 (Three) Times a Day., Disp: , Rfl:   •  HYDROcodone-acetaminophen (NORCO)  MG per tablet, Take 1 tablet by mouth 3 (Three) Times a Day., Disp: , Rfl:   •  hydrOXYzine (ATARAX) 50 MG tablet, Take 1 tablet by mouth 2 (Two) Times  "a Day., Disp: , Rfl:   •  methocarbamol (ROBAXIN) 750 MG tablet, Take 1 tablet by mouth 4 (Four) Times a Day As Needed for Muscle Spasms., Disp: , Rfl:   •  montelukast (SINGULAIR) 10 MG tablet, Take 1 tablet by mouth Every Night., Disp: 30 tablet, Rfl: 5  •  naloxone (NARCAN) 4 MG/0.1ML nasal spray, 1 spray into the nostril(s) as directed by provider., Disp: , Rfl:   •  OLANZapine (zyPREXA) 7.5 MG tablet, olanzapine 7.5 mg tablet, Disp: , Rfl:   •  propranolol (INDERAL) 20 MG tablet, Take 2 tablets by mouth 2 (Two) Times a Day., Disp: , Rfl:   •  QUEtiapine (SEROquel) 100 MG tablet, Take 1 tablet by mouth Every Night., Disp: , Rfl:   •  rOPINIRole (REQUIP) 4 MG tablet, Take 1 tablet by mouth Every Night., Disp: , Rfl:   •  Tezepelumab-ekko (Tezspire) 210 MG/1.91ML injection, Inject 210 mg under the skin into the appropriate area as directed Every 28 (Twenty-Eight) Days., Disp: 1.91 mL, Rfl: 5  •  Tiotropium Bromide Monohydrate (Spiriva Respimat) 1.25 MCG/ACT aerosol solution inhaler, Inhale 2 puffs Daily., Disp: 4 g, Rfl: 8  •  dexlansoprazole (DEXILANT) 60 MG capsule, Take 1 capsule by mouth Every Morning Before Breakfast. Take one capsule 30 minutes prior to eating breakfast, Disp: 90 capsule, Rfl: 3    ALLERGIES:  No Known Allergies    VISIT VITALS/PHYSICAL EXAM:  Ht 180.3 cm (71\")   Wt 89.7 kg (197 lb 12.8 oz)   BMI 27.59 kg/m²   Physical Exam  Constitutional:       General: He is not in acute distress.     Appearance: Normal appearance. He is well-developed.   HENT:      Head: Normocephalic and atraumatic.   Eyes:      Pupils: Pupils are equal, round, and reactive to light.   Cardiovascular:      Rate and Rhythm: Normal rate and regular rhythm.   Pulmonary:      Effort: Pulmonary effort is normal. No respiratory distress.      Breath sounds: No wheezing, rhonchi or rales.   Abdominal:      General: Abdomen is flat. There is no distension.      Palpations: Abdomen is soft. There is no mass.      Tenderness: " There is no abdominal tenderness. There is no guarding or rebound.      Hernia: No hernia is present.   Musculoskeletal:         General: No swelling. Normal range of motion.      Cervical back: Normal range of motion and neck supple.      Right lower leg: No edema.      Left lower leg: No edema.   Skin:     General: Skin is warm and dry.   Neurological:      Mental Status: He is alert and oriented to person, place, and time.   Psychiatric:         Attention and Perception: Attention normal.         Mood and Affect: Mood normal.         Speech: Speech normal.         Behavior: Behavior normal. Behavior is cooperative.         Thought Content: Thought content normal.           PATHOLOGY:   NONE      ENDOSCOPY:  NONE      IMAGING:     No Images in the past 120 days found..     RECENT LABS:  Lab Results   Component Value Date    WBC 4.68 03/10/2022    HGB 15.5 03/10/2022    HCT 45.9 03/10/2022    MCV 90.5 03/10/2022    RDW 13.2 03/10/2022     03/10/2022    NEUTRORELPCT 33.7 (L) 03/10/2022    LYMPHORELPCT 38.7 03/10/2022    MONORELPCT 7.7 03/10/2022    EOSRELPCT 18.2 (H) 03/10/2022    BASORELPCT 1.5 03/10/2022    NEUTROABS 1.58 (L) 03/10/2022    LYMPHSABS 1.81 03/10/2022       No results found for: NA, K, CO2, CL, BUN, CREATININE, EGFRIFNONA, EGFRIFAFRI, GLUCOSE, CALCIUM, ALKPHOS, AST, ALT, BILITOT, ALBUMIN, PROTEINTOT, MG, PHOS      ASSESSMENT & PLAN:  Patient will patient's symptoms- I will go ahead and discontinue him off of Nexium 40 mg daily as well as famotidine 20 mg nightly.  He will begin Dexilant 60 mg once daily   Diagnosis Plan   1. Gastroesophageal reflux disease without esophagitis  dexlansoprazole (DEXILANT) 60 MG capsule          Return in about 8 weeks (around 6/26/2023).          Electronically Signed by: Martinez Hurt PA-C , May 1, 2023 13:06 EDT       CC:   No ref. provider found  Gerson Goodman APRN    Thank you so much for allowing us to participate in the care of Reji Pena  Linares . Please do not hesitate to contact us with any questions or concerns.

## 2023-05-03 DIAGNOSIS — J45.50 SEVERE PERSISTENT ASTHMA WITHOUT COMPLICATION: ICD-10-CM

## 2023-05-03 RX ORDER — BENZONATATE 100 MG/1
CAPSULE ORAL
Qty: 42 CAPSULE | Refills: 6 | OUTPATIENT
Start: 2023-05-03

## 2023-05-30 ENCOUNTER — CLINICAL SUPPORT (OUTPATIENT)
Dept: PULMONOLOGY | Facility: CLINIC | Age: 53
End: 2023-05-30

## 2023-05-30 DIAGNOSIS — J45.50 SEVERE PERSISTENT ASTHMA WITHOUT COMPLICATION: Primary | ICD-10-CM

## 2023-06-12 RX ORDER — TEZEPELUMAB-EKKO 210 MG/1.9ML
210 INJECTION, SOLUTION SUBCUTANEOUS
COMMUNITY

## 2023-07-28 ENCOUNTER — CLINICAL SUPPORT (OUTPATIENT)
Dept: PULMONOLOGY | Facility: CLINIC | Age: 53
End: 2023-07-28
Payer: MEDICARE

## 2023-07-28 DIAGNOSIS — J45.50 SEVERE PERSISTENT ASTHMA WITHOUT COMPLICATION: Primary | ICD-10-CM

## 2023-08-08 NOTE — PROGRESS NOTES
DATE:  8/9/2023    DIAGNOSIS: GERD    CHIEF COMPLAINT:  GERD, nausea     HPI:  Mr. Linares has been following with Martinez Hurt PA-C and was last seen in May. At that time, planned to start Dexilant 60 mg PO daily. However, his insurance would not cover this. He has therefore been taking nexium 40 mg PO daily and pepcid 40 mg PO twice daily. With this regimen, he continues to have ~2-3 flares/week. He has occasional nausea without vomiting. He also has intermittent epigastric pain. As above, he has had previous EGD with Dr. Dai ~1 year ago. He has no other complaints today.     INTERVAL HISTORY:  Mr. Linares presents today for follow up. Since his last visit, he has been taking Lansoprazole 30 mg PO twice daily and Pepcid 40 mg twice daily. However, he continues to have ~3-4 flares per week with burning in his chest and throat. He also reports frequent nausea without vomiting. Denies epigastric pain. Denies dysphagia. He retains his gallbladder. As above, he had previous EGD with Dr. Dai ~ 1 year ago (records unavailable). We previously tried to start Dexilant but his insurance will not cover this medication. He has no other complaints today.     PAST MEDICAL HISTORY:  Past Medical History:   Diagnosis Date    Headache     Low back pain     Sleep apnea        PAST SURGICAL HISTORY:  Past Surgical History:   Procedure Laterality Date    [place] MYELOGRAPHY VIA LUMBAR INJECTION RS&I CERVICAL N/A 10/29/2019    Procedure: IR myelogram, cervical;  Surgeon: Jose Daniel Kirby MD;  Location: [facility] INVASIVE LOCATION;  Service: Interventional Radiology       SOCIAL HISTORY:  Social History     Socioeconomic History    Marital status:    Tobacco Use    Smoking status: Never    Smokeless tobacco: Never   Vaping Use    Vaping Use: Never used   Substance and Sexual Activity    Alcohol use: No    Drug use: No    Sexual activity: Defer       FAMILY HISTORY:  Family History   Problem Relation Age of Onset    Heart disease  Mother     Arthritis Mother     Diabetes Mother     Heart disease Father     Arthritis Father        MEDICATIONS:  The current medication list was reviewed in the EMR    Current Outpatient Medications:     albuterol sulfate  (90 Base) MCG/ACT inhaler, Inhale 2 puffs Every 4 (Four) Hours As Needed for Wheezing., Disp: 18 g, Rfl: 8    baclofen (LIORESAL) 10 MG tablet, Take 1 tablet by mouth 2 (Two) Times a Day., Disp: , Rfl:     benzonatate (TESSALON) 100 MG capsule, Take 1 capsule by mouth 3 (Three) Times a Day As Needed for Cough., Disp: 42 capsule, Rfl: 6    budesonide-formoterol (Symbicort) 160-4.5 MCG/ACT inhaler, Inhale 2 puffs 2 (Two) Times a Day., Disp: 1 each, Rfl: 8    busPIRone (BUSPAR) 30 MG tablet, Take 1 tablet by mouth Daily., Disp: , Rfl:     celecoxib (CeleBREX) 200 MG capsule, Take 1 capsule by mouth 2 (Two) Times a Day., Disp: 60 capsule, Rfl: 1    clonazePAM (KlonoPIN) 0.5 MG tablet, Take 1 tablet by mouth 2 (Two) Times a Day As Needed., Disp: , Rfl:     diclofenac (VOLTAREN) 75 MG EC tablet, , Disp: , Rfl:     DULoxetine (CYMBALTA) 60 MG capsule, Take 1 capsule by mouth 2 (Two) Times a Day., Disp: , Rfl:     famotidine (Pepcid) 40 MG tablet, Take 1 tablet by mouth At Night As Needed for Heartburn., Disp: 30 tablet, Rfl: 5    gabapentin (NEURONTIN) 600 MG tablet, Take 1 tablet by mouth 3 (Three) Times a Day., Disp: , Rfl:     HYDROcodone-acetaminophen (NORCO)  MG per tablet, Take 1 tablet by mouth 3 (Three) Times a Day., Disp: , Rfl:     hydrOXYzine (ATARAX) 50 MG tablet, Take 1 tablet by mouth 2 (Two) Times a Day., Disp: , Rfl:     lansoprazole (PREVACID) 30 MG capsule, Take 1 capsule by mouth 2 (Two) Times a Day Before Meals. Take one capsule 30 minutes prior to eating breakfast, Disp: 60 capsule, Rfl: 5    methocarbamol (ROBAXIN) 750 MG tablet, Take 1 tablet by mouth 4 (Four) Times a Day As Needed for Muscle Spasms., Disp: , Rfl:     montelukast (SINGULAIR) 10 MG tablet, Take 1  tablet by mouth Every Night., Disp: 30 tablet, Rfl: 5    naloxone (NARCAN) 4 MG/0.1ML nasal spray, 1 spray into the nostril(s) as directed by provider., Disp: , Rfl:     OLANZapine (zyPREXA) 7.5 MG tablet, olanzapine 7.5 mg tablet, Disp: , Rfl:     propranolol (INDERAL) 20 MG tablet, Take 2 tablets by mouth 2 (Two) Times a Day., Disp: , Rfl:     QUEtiapine (SEROquel) 100 MG tablet, Take 1 tablet by mouth Every Night., Disp: , Rfl:     rOPINIRole (REQUIP) 4 MG tablet, Take 1 tablet by mouth Every Night., Disp: , Rfl:     terazosin (HYTRIN) 5 MG capsule, Take 1 capsule by mouth Every Night., Disp: 30 capsule, Rfl: 3    Tezepelumab-ekko (Tezspire) 210 MG/1.91ML injection, Inject 210 mg under the skin into the appropriate area as directed Every 28 (Twenty-Eight) Days., Disp: 1.91 mL, Rfl: 5    Tiotropium Bromide Monohydrate (Spiriva Respimat) 1.25 MCG/ACT aerosol solution inhaler, Inhale 2 puffs Daily., Disp: 4 g, Rfl: 8    ALLERGIES:  No Known Allergies      REVIEW OF SYSTEMS:    A comprehensive 14 point review of systems was performed.  Significant findings as mentioned above.  All other systems reviewed and are negative.        Physical Exam   Vital Signs:   Vitals:    08/09/23 0805   BP: 121/81   Pulse: 73     General: Well developed, well nourished, alert and oriented x 3, in no acute distress.   Head: ATNC   Eyes: PERRL, No evidence of conjunctivitis.   Nose: No nasal discharge.   Mouth: Oral mucosal membranes moist. No oral ulceration or hemorrhages.   Neck: Neck supple. No thyromegaly. No JVD.   Lungs: Clear in all fields to A&P without rales, rhonchi or wheezing.   Heart: RRR. No murmurs, rubs, or gallops.   Abdomen: Soft. Bowel sounds are normoactive. Nontender with palpation.   Extremities: No cyanosis or edema.   Neurologic: MS as above. Grossly non focal exam.      RECENT LABS:  Lab Results   Component Value Date    WBC 4.68 03/10/2022    HGB 15.5 03/10/2022    HCT 45.9 03/10/2022    MCV 90.5 03/10/2022     RDW 13.2 03/10/2022     03/10/2022    NEUTRORELPCT 33.7 (L) 03/10/2022    LYMPHORELPCT 38.7 03/10/2022    MONORELPCT 7.7 03/10/2022    EOSRELPCT 18.2 (H) 03/10/2022    BASORELPCT 1.5 03/10/2022    NEUTROABS 1.58 (L) 03/10/2022    LYMPHSABS 1.81 03/10/2022       ASSESSMENT & PLAN:  Reji Linares is a very pleasant 52 y.o. male with    1.  GERD:  2. Nausea:  -He has previously tried Nexium and omeprazole with poorly controlled symptoms.   -Rx for Dexilant 60 mg PO daily was previously provided. However, his insurance will not cover this.   -Therefore, most recently, we started him on Lansoprazole 30 mg PO twice daily in addition to Pepcid 40 mg PO twice daily. With this regimen, he continues to have ~3-4 flares/week. He retains his gallbladder.  -Recommended repeat EGD to further evaluate. After discussing the risks/benefits, patient was agreeable.   - RTC following the above procedure. In the meantime, we also discussed dietary modifications.     3. Fatty liver:  -This was noted on CT imaging in March 2021. Will obtain workup today including CBC, CMP, Ceruloplasmin, anti smooth muscle ab, antimicrosomal Ab, JULES, SU fibrosure, mitochondrial Ab, Iron panel, ferritin, acute hepatitis panel and Alpha 1 antitrypsin. Will also repeat US liver.   -Recommended tight control of the patient's blood sugar, cholesterol, triglycerides and weight as this is the best way to intervene and prevent ongoing liver damage as much as possible. Discussed patient's potential risk factors and that, if not well controlled, hepatic steatosis could progress to cirrhosis. He does not drink.   -Discussed the importance of following a healthy low-fat diet and was encouraged to incorporate exercise into lifestyle. He will have ultrasound of the liver and hepatic panel every 6 months for monitoring.       Electronically Signed by: MERLIN Anderson , August 8, 2023 13:47 EDT       CC:   Gerson Goodman APRN

## 2023-08-09 ENCOUNTER — OFFICE VISIT (OUTPATIENT)
Dept: GASTROENTEROLOGY | Facility: CLINIC | Age: 53
End: 2023-08-09
Payer: MEDICARE

## 2023-08-09 VITALS
SYSTOLIC BLOOD PRESSURE: 121 MMHG | BODY MASS INDEX: 27.16 KG/M2 | DIASTOLIC BLOOD PRESSURE: 81 MMHG | HEART RATE: 73 BPM | HEIGHT: 71 IN | WEIGHT: 194 LBS

## 2023-08-09 DIAGNOSIS — R53.83 OTHER FATIGUE: ICD-10-CM

## 2023-08-09 DIAGNOSIS — R11.0 NAUSEA: ICD-10-CM

## 2023-08-09 DIAGNOSIS — K76.0 FATTY LIVER: Primary | ICD-10-CM

## 2023-08-09 DIAGNOSIS — K21.9 GASTROESOPHAGEAL REFLUX DISEASE, UNSPECIFIED WHETHER ESOPHAGITIS PRESENT: ICD-10-CM

## 2023-08-09 LAB
ALBUMIN SERPL-MCNC: 4.5 G/DL (ref 3.5–5.2)
ALBUMIN/GLOB SERPL: 2.3 G/DL
ALP SERPL-CCNC: 88 U/L (ref 39–117)
ALPHA1 GLOB MFR UR ELPH: 136 MG/DL (ref 90–200)
ALT SERPL W P-5'-P-CCNC: 19 U/L (ref 1–41)
ANION GAP SERPL CALCULATED.3IONS-SCNC: 13.6 MMOL/L (ref 5–15)
AST SERPL-CCNC: 25 U/L (ref 1–40)
BASOPHILS # BLD AUTO: 0.06 10*3/MM3 (ref 0–0.2)
BASOPHILS NFR BLD AUTO: 1.6 % (ref 0–1.5)
BILIRUB SERPL-MCNC: 0.4 MG/DL (ref 0–1.2)
BUN SERPL-MCNC: 20 MG/DL (ref 6–20)
BUN/CREAT SERPL: 17.1 (ref 7–25)
CALCIUM SPEC-SCNC: 9 MG/DL (ref 8.6–10.5)
CERULOPLASMIN SERPL-MCNC: 20 MG/DL (ref 16–31)
CHLORIDE SERPL-SCNC: 102 MMOL/L (ref 98–107)
CO2 SERPL-SCNC: 23.4 MMOL/L (ref 22–29)
CREAT SERPL-MCNC: 1.17 MG/DL (ref 0.76–1.27)
DEPRECATED RDW RBC AUTO: 44.6 FL (ref 37–54)
EGFRCR SERPLBLD CKD-EPI 2021: 74.1 ML/MIN/1.73
EOSINOPHIL # BLD AUTO: 0.19 10*3/MM3 (ref 0–0.4)
EOSINOPHIL NFR BLD AUTO: 5 % (ref 0.3–6.2)
ERYTHROCYTE [DISTWIDTH] IN BLOOD BY AUTOMATED COUNT: 14 % (ref 12.3–15.4)
FERRITIN SERPL-MCNC: 54.8 NG/ML (ref 30–400)
GLOBULIN UR ELPH-MCNC: 2 GM/DL
GLUCOSE SERPL-MCNC: 108 MG/DL (ref 65–99)
HAV IGM SERPL QL IA: NORMAL
HBV CORE IGM SERPL QL IA: NORMAL
HBV SURFACE AG SERPL QL IA: NORMAL
HCT VFR BLD AUTO: 45.2 % (ref 37.5–51)
HCV AB SER DONR QL: NORMAL
HGB BLD-MCNC: 15.3 G/DL (ref 13–17.7)
IMM GRANULOCYTES # BLD AUTO: 0.01 10*3/MM3 (ref 0–0.05)
IMM GRANULOCYTES NFR BLD AUTO: 0.3 % (ref 0–0.5)
IRON 24H UR-MRATE: 95 MCG/DL (ref 59–158)
IRON SATN MFR SERPL: 23 % (ref 20–50)
LYMPHOCYTES # BLD AUTO: 1.45 10*3/MM3 (ref 0.7–3.1)
LYMPHOCYTES NFR BLD AUTO: 38.5 % (ref 19.6–45.3)
MCH RBC QN AUTO: 29.9 PG (ref 26.6–33)
MCHC RBC AUTO-ENTMCNC: 33.8 G/DL (ref 31.5–35.7)
MCV RBC AUTO: 88.5 FL (ref 79–97)
MONOCYTES # BLD AUTO: 0.3 10*3/MM3 (ref 0.1–0.9)
MONOCYTES NFR BLD AUTO: 8 % (ref 5–12)
NEUTROPHILS NFR BLD AUTO: 1.76 10*3/MM3 (ref 1.7–7)
NEUTROPHILS NFR BLD AUTO: 46.6 % (ref 42.7–76)
NRBC BLD AUTO-RTO: 0 /100 WBC (ref 0–0.2)
PLATELET # BLD AUTO: 235 10*3/MM3 (ref 140–450)
PMV BLD AUTO: 10.3 FL (ref 6–12)
POTASSIUM SERPL-SCNC: 4.6 MMOL/L (ref 3.5–5.2)
PROT SERPL-MCNC: 6.5 G/DL (ref 6–8.5)
RBC # BLD AUTO: 5.11 10*6/MM3 (ref 4.14–5.8)
SODIUM SERPL-SCNC: 139 MMOL/L (ref 136–145)
TIBC SERPL-MCNC: 414 MCG/DL (ref 298–536)
TRANSFERRIN SERPL-MCNC: 278 MG/DL (ref 200–360)
WBC NRBC COR # BLD: 3.77 10*3/MM3 (ref 3.4–10.8)

## 2023-08-09 PROCEDURE — 82977 ASSAY OF GGT: CPT | Performed by: NURSE PRACTITIONER

## 2023-08-09 PROCEDURE — 86376 MICROSOMAL ANTIBODY EACH: CPT | Performed by: NURSE PRACTITIONER

## 2023-08-09 PROCEDURE — 80074 ACUTE HEPATITIS PANEL: CPT | Performed by: NURSE PRACTITIONER

## 2023-08-09 PROCEDURE — 84466 ASSAY OF TRANSFERRIN: CPT | Performed by: NURSE PRACTITIONER

## 2023-08-09 PROCEDURE — 82247 BILIRUBIN TOTAL: CPT | Performed by: NURSE PRACTITIONER

## 2023-08-09 PROCEDURE — 82172 ASSAY OF APOLIPOPROTEIN: CPT | Performed by: NURSE PRACTITIONER

## 2023-08-09 PROCEDURE — 83540 ASSAY OF IRON: CPT | Performed by: NURSE PRACTITIONER

## 2023-08-09 PROCEDURE — 82947 ASSAY GLUCOSE BLOOD QUANT: CPT | Performed by: NURSE PRACTITIONER

## 2023-08-09 PROCEDURE — 1159F MED LIST DOCD IN RCRD: CPT | Performed by: NURSE PRACTITIONER

## 2023-08-09 PROCEDURE — 82728 ASSAY OF FERRITIN: CPT | Performed by: NURSE PRACTITIONER

## 2023-08-09 PROCEDURE — 83883 ASSAY NEPHELOMETRY NOT SPEC: CPT | Performed by: NURSE PRACTITIONER

## 2023-08-09 PROCEDURE — 1160F RVW MEDS BY RX/DR IN RCRD: CPT | Performed by: NURSE PRACTITIONER

## 2023-08-09 PROCEDURE — 84478 ASSAY OF TRIGLYCERIDES: CPT | Performed by: NURSE PRACTITIONER

## 2023-08-09 PROCEDURE — 86015 ACTIN ANTIBODY EACH: CPT | Performed by: NURSE PRACTITIONER

## 2023-08-09 PROCEDURE — 80053 COMPREHEN METABOLIC PANEL: CPT | Performed by: NURSE PRACTITIONER

## 2023-08-09 PROCEDURE — 82465 ASSAY BLD/SERUM CHOLESTEROL: CPT | Performed by: NURSE PRACTITIONER

## 2023-08-09 PROCEDURE — 36415 COLL VENOUS BLD VENIPUNCTURE: CPT | Performed by: NURSE PRACTITIONER

## 2023-08-09 PROCEDURE — 85025 COMPLETE CBC W/AUTO DIFF WBC: CPT | Performed by: NURSE PRACTITIONER

## 2023-08-09 PROCEDURE — 99214 OFFICE O/P EST MOD 30 MIN: CPT | Performed by: NURSE PRACTITIONER

## 2023-08-09 PROCEDURE — 86038 ANTINUCLEAR ANTIBODIES: CPT | Performed by: NURSE PRACTITIONER

## 2023-08-09 PROCEDURE — 82390 ASSAY OF CERULOPLASMIN: CPT | Performed by: NURSE PRACTITIONER

## 2023-08-09 PROCEDURE — 86381 MITOCHONDRIAL ANTIBODY EACH: CPT | Performed by: NURSE PRACTITIONER

## 2023-08-09 PROCEDURE — 82103 ALPHA-1-ANTITRYPSIN TOTAL: CPT | Performed by: NURSE PRACTITIONER

## 2023-08-09 PROCEDURE — 83010 ASSAY OF HAPTOGLOBIN QUANT: CPT | Performed by: NURSE PRACTITIONER

## 2023-08-10 LAB
A2 MACROGLOB SERPL-MCNC: 198 MG/DL (ref 110–276)
ALT SERPL W P-5'-P-CCNC: 24 IU/L (ref 0–55)
ANA SER QL: NEGATIVE
APO A-I SERPL-MCNC: 137 MG/DL (ref 101–178)
AST SERPL W P-5'-P-CCNC: 26 IU/L (ref 0–40)
BILIRUB SERPL-MCNC: 0.4 MG/DL (ref 0–1.2)
CHOLEST SERPL-MCNC: 161 MG/DL (ref 100–199)
FIBROSIS SCORING:: ABNORMAL
FIBROSIS STAGE SERPL QL: ABNORMAL
GGT SERPL-CCNC: 24 IU/L (ref 0–65)
GLUCOSE SERPL-MCNC: 112 MG/DL (ref 70–99)
HAPTOGLOB SERPL-MCNC: 70 MG/DL (ref 29–370)
LABORATORY COMMENT REPORT: ABNORMAL
LIVER FIBR SCORE SERPL CALC.FIBROSURE: 0.28 (ref 0–0.21)
LIVER STEATOSIS GRADE SERPL QL: ABNORMAL
LIVER STEATOSIS SCORE SERPL: 0.39 (ref 0–0.4)
LKM-1 AB SER-ACNC: 1.3 UNITS (ref 0–20)
MITOCHONDRIA M2 IGG SER-ACNC: <20 UNITS (ref 0–20)
NASH GRADE SERPL QL: ABNORMAL
NASH INTERPRETATION SERPL-IMP: ABNORMAL
NASH SCORE SERPL: 0 (ref 0–0.25)
NASH SCORING: ABNORMAL
SMA IGG SER-ACNC: 9 UNITS (ref 0–19)
STEATOSIS SCORING: ABNORMAL
TEST PERFORMANCE INFO SPEC: ABNORMAL
TEST PERFORMANCE INFO SPEC: ABNORMAL
TRIGL SERPL-MCNC: 133 MG/DL (ref 0–149)

## 2023-08-21 PROBLEM — R11.0 NAUSEA: Status: ACTIVE | Noted: 2023-08-21

## 2023-08-25 ENCOUNTER — CLINICAL SUPPORT (OUTPATIENT)
Dept: PULMONOLOGY | Facility: CLINIC | Age: 53
End: 2023-08-25
Payer: MEDICARE

## 2023-08-25 DIAGNOSIS — J45.50 SEVERE PERSISTENT ASTHMA WITHOUT COMPLICATION: Primary | ICD-10-CM

## 2023-09-08 ENCOUNTER — HOSPITAL ENCOUNTER (OUTPATIENT)
Dept: ULTRASOUND IMAGING | Facility: HOSPITAL | Age: 53
Discharge: HOME | End: 2023-09-08
Admitting: NURSE PRACTITIONER
Payer: MEDICARE

## 2023-09-08 DIAGNOSIS — K76.0 FATTY LIVER: ICD-10-CM

## 2023-09-08 PROCEDURE — 76705 ECHO EXAM OF ABDOMEN: CPT

## 2023-09-12 ENCOUNTER — SPECIALTY PHARMACY (OUTPATIENT)
Dept: PHARMACY | Facility: HOSPITAL | Age: 53
End: 2023-09-12
Payer: MEDICARE

## 2023-09-12 NOTE — PROGRESS NOTES
[facility]  Asthma Management       Reji Linares is a 52 y.o. male referred by Pulmonology to the [facility] for severe  asthma.  The patient's current asthma regimen includes: Symbicort, Spiriva, Tezspire and Singulair and Pt reports good adherence to oral medications. He receives this medication through the [facility] as of 3/23 and it is being shipped to the Pulmonology office to be given by the MA there. Pt reports he is not a smoker or exposed to second hand smoke.     Reji Linares reports asthma kept them from getting as much done some of the time and having shortness of breath once a day.   Pt reports nighttime awakening once a week due to asthma symptoms and using a rescue inhaler 2-3 times per week.      Pt self rates asthma control as Well Controlled.     He reports that he can tell a difference with Tezspire on board and denies any side effects from the medication. He gets the injections administered by the MA at the Pulmonology clinic and has not missed any doses.        Past Medical History:   Diagnosis Date    Headache     Low back pain     Sleep apnea      Social History     Socioeconomic History    Marital status:    Tobacco Use    Smoking status: Never    Smokeless tobacco: Never   Vaping Use    Vaping Use: Never used   Substance and Sexual Activity    Alcohol use: No    Drug use: No    Sexual activity: Defer     Patient has no known allergies.    Current Outpatient Medications:     albuterol sulfate  (90 Base) MCG/ACT inhaler, Inhale 2 puffs Every 4 (Four) Hours As Needed for Wheezing., Disp: 18 g, Rfl: 8    baclofen (LIORESAL) 10 MG tablet, Take 1 tablet by mouth 2 (Two) Times a Day., Disp: , Rfl:     benzonatate (TESSALON) 100 MG capsule, Take 1 capsule by mouth 3 (Three) Times a Day As Needed for Cough., Disp: 42 capsule, Rfl: 6    budesonide-formoterol (Symbicort) 160-4.5 MCG/ACT inhaler, Inhale 2 puffs 2 (Two) Times a  Day., Disp: 1 each, Rfl: 8    busPIRone (BUSPAR) 30 MG tablet, Take 1 tablet by mouth Daily., Disp: , Rfl:     clonazePAM (KlonoPIN) 0.5 MG tablet, Take 1 tablet by mouth 2 (Two) Times a Day As Needed., Disp: , Rfl:     diclofenac (VOLTAREN) 75 MG EC tablet, Take 1 tablet by mouth 2 (Two) Times a Day As Needed., Disp: , Rfl:     DULoxetine (CYMBALTA) 60 MG capsule, Take 1 capsule by mouth 2 (Two) Times a Day., Disp: , Rfl:     famotidine (Pepcid) 40 MG tablet, Take 1 tablet by mouth At Night As Needed for Heartburn., Disp: 30 tablet, Rfl: 5    gabapentin (NEURONTIN) 600 MG tablet, Take 1 tablet by mouth 3 (Three) Times a Day., Disp: , Rfl:     HYDROcodone-acetaminophen (NORCO)  MG per tablet, Take 1 tablet by mouth 3 (Three) Times a Day., Disp: , Rfl:     hydrOXYzine (ATARAX) 50 MG tablet, Take 1 tablet by mouth 2 (Two) Times a Day., Disp: , Rfl:     methocarbamol (ROBAXIN) 750 MG tablet, Take 1 tablet by mouth 4 (Four) Times a Day As Needed for Muscle Spasms., Disp: , Rfl:     montelukast (SINGULAIR) 10 MG tablet, Take 1 tablet by mouth Every Night., Disp: 30 tablet, Rfl: 5    naloxone (NARCAN) 4 MG/0.1ML nasal spray, 1 spray into the nostril(s) as directed by provider., Disp: , Rfl:     OLANZapine (zyPREXA) 7.5 MG tablet, Take 1 tablet by mouth Every Night., Disp: , Rfl:     propranolol (INDERAL) 20 MG tablet, Take 2 tablets by mouth 2 (Two) Times a Day., Disp: , Rfl:     QUEtiapine (SEROquel) 100 MG tablet, Take 1 tablet by mouth Every Night., Disp: , Rfl:     rOPINIRole (REQUIP) 4 MG tablet, Take 1 tablet by mouth Every Night., Disp: , Rfl:     terazosin (HYTRIN) 5 MG capsule, Take 1 capsule by mouth Every Night., Disp: 30 capsule, Rfl: 3    Tezepelumab-ekko (Tezspire) 210 MG/1.91ML injection, Inject 210 mg under the skin into the appropriate area as directed Every 28 (Twenty-Eight) Days., Disp: 1.91 mL, Rfl: 5    Tiotropium Bromide Monohydrate (Spiriva Respimat) 1.25 MCG/ACT aerosol solution inhaler,  Inhale 2 puffs Daily., Disp: 4 g, Rfl: 8  No current facility-administered medications for this visit.        Asthma Control Test Results:   0-15 Very Poorly Controlled Asthma   15-20 Poorly Controlled Asthma  20-25 Well Controlled Asthma     Date  8/26/22 3/30/23 9/12/23      ACT Results 15 11 15      ACT Results Very Poorly  Controlled Asthma  Very Poorly  Controlled Asthma  Poorly  Controlled Asthma          Vaccination Status   COVID 19: wants bivalent  Influenza: UTD  Pneumococcal: UTD  Zoster: Needed: UTD    Drug-Drug Interactions: None with Tezspire    Drug-Disease Interactions (non-cardioselective beta blockers, NSAIDs): Propranolol (for anxiety); Diclofenac    Patient Assistance: Not required    Inhaler Technique Observed? No  If yes, notes:     Treatment Goals: Risk Reduction and Symptom Control     Medication Assessment & Plan:     Patient will continue Tezspire 210 mg SubQ every 4 weeks for severe asthma given by Pulmonology MA. Advised Pt to avoid live vaccines while on this medication.     Advised Pt on the importance of continuing maintenance inhaler regimen and the importance of rinsing mouth after ICS use. This injection does not replace your maintenance inhaler and is not used to treat an asthma attack (use a rescue inhaler).     Patient is on a non-selective beta blocker, propranolol, but reports relief from his albuterol inhaler when he does have to use it.  Will inform provider.     Patient is on duplicate therapy with Olanzapine and Quetiapine- will make PCP aware.     Patient will continue regular follow-up with pulmonology. Patient will follow-up with specialty mail out services via [facility] [employer].      Will follow-up in 6 months, or sooner if needed.     Vicky Johnson RPH  9/12/2023  14:46 EDT

## 2023-09-14 ENCOUNTER — TELEPHONE (OUTPATIENT)
Dept: GASTROENTEROLOGY | Facility: CLINIC | Age: 53
End: 2023-09-14
Payer: MEDICARE

## 2023-09-14 DIAGNOSIS — K76.0 FATTY LIVER: Primary | ICD-10-CM

## 2023-09-14 NOTE — PROGRESS NOTES
Called and discussed liver workup with Mr. Linares. CBC showed normal blood counts. His LFTs were normal. SU fibrosure showed no steatosis but fibrosis stage F1. Repeat US liver showed diffuse fatty infiltration of liver. The remaining workup was unremarkable. Patient denies history of diabetes, HLD and is without obesity. He does not drink alcohol. When asked about his typical diet, he does report eating out frequently and consuming of high fat diet. Discussed with Dr. Amezcua who recommended US guided liver biopsy given the above workup to confirm fatty liver. After discussing with patient, he is agreeable. Will schedule. Will have him RTC following liver biopsy.

## 2023-09-14 NOTE — TELEPHONE ENCOUNTER
Per Anna, I have ordered US guided liver biopsy. I discussed his workup with him on phone and he is aware and agreeable. Can we please get this scheduled. Once biopsy is scheduled, he will need follow up with me ~2-3 weeks after biopsy . Thank you both!

## 2023-09-15 NOTE — TELEPHONE ENCOUNTER
I called patient to let him know to make a F/U Appt with Anna 2-3 weeks after the liver biopsy, no answer, I left a message on his VM.

## 2023-09-19 ENCOUNTER — PROCEDURE VISIT (OUTPATIENT)
Dept: UROLOGY | Facility: CLINIC | Age: 53
End: 2023-09-19
Payer: MEDICARE

## 2023-09-19 VITALS
HEART RATE: 69 BPM | BODY MASS INDEX: 26.57 KG/M2 | DIASTOLIC BLOOD PRESSURE: 92 MMHG | HEIGHT: 71 IN | SYSTOLIC BLOOD PRESSURE: 158 MMHG | WEIGHT: 189.8 LBS

## 2023-09-19 DIAGNOSIS — N40.1 BENIGN PROSTATIC HYPERPLASIA WITH URINARY HESITANCY: Primary | ICD-10-CM

## 2023-09-19 DIAGNOSIS — N47.1 PHIMOSIS: ICD-10-CM

## 2023-09-19 DIAGNOSIS — R39.11 BENIGN PROSTATIC HYPERPLASIA WITH URINARY HESITANCY: Primary | ICD-10-CM

## 2023-09-19 RX ORDER — GENTAMICIN SULFATE 40 MG/ML
80 INJECTION, SOLUTION INTRAMUSCULAR; INTRAVENOUS ONCE
Status: COMPLETED | OUTPATIENT
Start: 2023-09-19 | End: 2023-09-19

## 2023-09-19 RX ADMIN — GENTAMICIN SULFATE 80 MG: 40 INJECTION, SOLUTION INTRAMUSCULAR; INTRAVENOUS at 08:59

## 2023-09-19 NOTE — PROGRESS NOTES
Chief Complaint:      Chief Complaint   Patient presents with    Cystoscopy     Benign prostatic hyperplasia         HPI:   52 y.o. male presents for cystoscopy secondary to severe burning.  When he urinates his foreskin balloons.  He is here for cystoscopy but unable to place a scope this is clearly severe phimosis I will set him up for elective circumcision and concomitant cystoscopy to complete the work-up.    Past Medical History:     Past Medical History:   Diagnosis Date    Headache     Low back pain     Sleep apnea        Current Meds:     Current Outpatient Medications   Medication Sig Dispense Refill    albuterol sulfate  (90 Base) MCG/ACT inhaler Inhale 2 puffs Every 4 (Four) Hours As Needed for Wheezing. 18 g 8    baclofen (LIORESAL) 10 MG tablet Take 1 tablet by mouth 2 (Two) Times a Day.      benzonatate (TESSALON) 100 MG capsule Take 1 capsule by mouth 3 (Three) Times a Day As Needed for Cough. 42 capsule 6    budesonide-formoterol (Symbicort) 160-4.5 MCG/ACT inhaler Inhale 2 puffs 2 (Two) Times a Day. 1 each 8    busPIRone (BUSPAR) 30 MG tablet Take 1 tablet by mouth Daily.      clonazePAM (KlonoPIN) 0.5 MG tablet Take 1 tablet by mouth 2 (Two) Times a Day As Needed.      diclofenac (VOLTAREN) 75 MG EC tablet Take 1 tablet by mouth 2 (Two) Times a Day As Needed.      DULoxetine (CYMBALTA) 60 MG capsule Take 1 capsule by mouth 2 (Two) Times a Day.      famotidine (Pepcid) 40 MG tablet Take 1 tablet by mouth At Night As Needed for Heartburn. 30 tablet 5    gabapentin (NEURONTIN) 600 MG tablet Take 1 tablet by mouth 3 (Three) Times a Day.      HYDROcodone-acetaminophen (NORCO)  MG per tablet Take 1 tablet by mouth 3 (Three) Times a Day.      hydrOXYzine (ATARAX) 50 MG tablet Take 1 tablet by mouth 2 (Two) Times a Day.      methocarbamol (ROBAXIN) 750 MG tablet Take 1 tablet by mouth 4 (Four) Times a Day As Needed for Muscle Spasms.      montelukast (SINGULAIR) 10 MG tablet Take 1 tablet by  mouth Every Night. 30 tablet 5    naloxone (NARCAN) 4 MG/0.1ML nasal spray 1 spray into the nostril(s) as directed by provider.      OLANZapine (zyPREXA) 7.5 MG tablet Take 1 tablet by mouth Every Night.      propranolol (INDERAL) 20 MG tablet Take 2 tablets by mouth 2 (Two) Times a Day.      QUEtiapine (SEROquel) 100 MG tablet Take 1 tablet by mouth Every Night.      rOPINIRole (REQUIP) 4 MG tablet Take 1 tablet by mouth Every Night.      terazosin (HYTRIN) 5 MG capsule Take 1 capsule by mouth Every Night. 30 capsule 3    Tezepelumab-ekko (Tezspire) 210 MG/1.91ML injection Inject 210 mg under the skin into the appropriate area as directed Every 28 (Twenty-Eight) Days. 1.91 mL 5    Tiotropium Bromide Monohydrate (Spiriva Respimat) 1.25 MCG/ACT aerosol solution inhaler Inhale 2 puffs Daily. 4 g 8     No current facility-administered medications for this visit.        Allergies:      No Known Allergies     Past Surgical History:     Past Surgical History:   Procedure Laterality Date    [place] MYELOGRAPHY VIA LUMBAR INJECTION RS&I CERVICAL N/A 10/29/2019    Procedure: IR myelogram, cervical;  Surgeon: Jose Daniel Kirby MD;  Location: [facility] INVASIVE LOCATION;  Service: Interventional Radiology       Social History:     Social History     Socioeconomic History    Marital status:    Tobacco Use    Smoking status: Never    Smokeless tobacco: Never   Vaping Use    Vaping Use: Never used   Substance and Sexual Activity    Alcohol use: No    Drug use: No    Sexual activity: Defer       Family History:     Family History   Problem Relation Age of Onset    Heart disease Mother     Arthritis Mother     Diabetes Mother     Heart disease Father     Arthritis Father        Review of Systems:     Review of Systems   Constitutional: Negative.    HENT: Negative.     Eyes: Negative.    Respiratory: Negative.     Cardiovascular: Negative.    Gastrointestinal: Negative.    Endocrine: Negative.    Musculoskeletal: Negative.     Allergic/Immunologic: Negative.    Neurological: Negative.    Hematological: Negative.    Psychiatric/Behavioral: Negative.       Physical Exam:     Physical Exam  Vitals and nursing note reviewed.   Constitutional:       Appearance: He is well-developed.   HENT:      Head: Normocephalic and atraumatic.   Eyes:      Conjunctiva/sclera: Conjunctivae normal.      Pupils: Pupils are equal, round, and reactive to light.   Cardiovascular:      Rate and Rhythm: Normal rate and regular rhythm.      Heart sounds: Normal heart sounds.   Pulmonary:      Effort: Pulmonary effort is normal.      Breath sounds: Normal breath sounds.   Abdominal:      General: Bowel sounds are normal.      Palpations: Abdomen is soft.   Genitourinary:     Comments: Tight phimosis  Musculoskeletal:         General: Normal range of motion.      Cervical back: Normal range of motion.   Skin:     General: Skin is warm and dry.   Neurological:      Mental Status: He is alert and oriented to person, place, and time.      Deep Tendon Reflexes: Reflexes are normal and symmetric.   Psychiatric:         Behavior: Behavior normal.         Thought Content: Thought content normal.         Judgment: Judgment normal.       I have reviewed the following portions of the patient's history: Allergies, current medications, past family history, past medical history, past social history, past surgical history, problem list, and ROS and confirm it is accurate.    Recent Image (CT and/or KUB):      CT Abdomen and Pelvis: No results found for this or any previous visit.       CT Stone Protocol: No results found for this or any previous visit.       KUB: No results found for this or any previous visit.       Labs (past 3 months):      Office Visit on 08/09/2023   Component Date Value Ref Range Status    Glucose 08/09/2023 108 (H)  65 - 99 mg/dL Final    BUN 08/09/2023 20  6 - 20 mg/dL Final    Creatinine 08/09/2023 1.17  0.76 - 1.27 mg/dL Final    Sodium 08/09/2023 139   136 - 145 mmol/L Final    Potassium 08/09/2023 4.6  3.5 - 5.2 mmol/L Final    Chloride 08/09/2023 102  98 - 107 mmol/L Final    CO2 08/09/2023 23.4  22.0 - 29.0 mmol/L Final    Calcium 08/09/2023 9.0  8.6 - 10.5 mg/dL Final    Total Protein 08/09/2023 6.5  6.0 - 8.5 g/dL Final    Albumin 08/09/2023 4.5  3.5 - 5.2 g/dL Final    ALT (SGPT) 08/09/2023 19  1 - 41 U/L Final    AST (SGOT) 08/09/2023 25  1 - 40 U/L Final    Alkaline Phosphatase 08/09/2023 88  39 - 117 U/L Final    Total Bilirubin 08/09/2023 0.4  0.0 - 1.2 mg/dL Final    Globulin 08/09/2023 2.0  gm/dL Final    A/G Ratio 08/09/2023 2.3  g/dL Final    BUN/Creatinine Ratio 08/09/2023 17.1  7.0 - 25.0 Final    Anion Gap 08/09/2023 13.6  5.0 - 15.0 mmol/L Final    eGFR 08/09/2023 74.1  >60.0 mL/min/1.73 Final    Ceruloplasmin 08/09/2023 20  16 - 31 mg/dL Final    Smooth Muscle Ab 08/09/2023 9  0 - 19 Units Final                     Negative                     0 - 19                   Weak positive               20 - 30                   Moderate to strong positive     >30   Actin Antibodies are found in 52-85% of patients with   autoimmune hepatitis or chronic active hepatitis and   in 22% of patients with primary biliary cirrhosis.    Liver Fraction: 08/09/2023 1.3  0.0 - 20.0 Units Final                                    Negative    0.0 - 20.0                                  Equivocal  20.1 - 24.9                                  Positive         >24.9  LKM type 1 antibodies are detected in patients with  autoimmune hepatitis type 2 and in up to 8% of  patients with chronic HCV infection.    JULES Direct 08/09/2023 Negative  Negative Final    Fibrosis Score 08/09/2023 0.28 (H)  0.00 - 0.21 Final    Fibrosis Stage 08/09/2023 Comment   Final                     F1 - Portal fibrosis    Steatosis Score (Reference) 08/09/2023 0.39  0.00 - 0.40 Final    Steatosis Grade (Reference) 08/09/2023 Comment   Final                       S0 - No Steatosis (<5%)    SU  Score (Reference) 08/09/2023 0.00  0.00 - 0.25 Final    Su Grade (Reference) 08/09/2023 Comment   Final                       N0 - No SU    Methodology: 08/09/2023 Comment   Final    The analytes tested are performed by FibroSure-Specific methods.  Not intended for use with other diagnostic considerations.    Alpha 2-Macroglobulins, Qn 08/09/2023 198  110 - 276 mg/dL Final    Haptoglobin 08/09/2023 70  29 - 370 mg/dL Final    Apolipoprotein A-1 08/09/2023 137  101 - 178 mg/dL Final    Total Bilirubin 08/09/2023 0.4  0.0 - 1.2 mg/dL Final    GGT 08/09/2023 24  0 - 65 IU/L Final    ALT (SGPT) 08/09/2023 24  0 - 55 IU/L Final    AST (SGOT) P5P (Reference) 08/09/2023 26  0 - 40 IU/L Final    Cholesterol, Total (Reference) 08/09/2023 161  100 - 199 mg/dL Final    Glucose, Serum (Reference) 08/09/2023 112 (H)  70 - 99 mg/dL Final    Triglycerides 08/09/2023 133  0 - 149 mg/dL Final    Interpretations: (Reference) 08/09/2023 Comment   Final    Comment: Quantitative results of 10 biochemicals in combination with age and  gender, are analyzed using a computational algorithm to provide a  quantitative surrogate marker (0.0-1.0) of liver fibrosis (Metavir  F0-F4), hepatic steatosis (0.0-1.0, S0-S3), and Non-Alcoholic  Steato-Hepatitis (SU) (0.0-1.0, N0-N3). The absence of steatosis  (S<0.40) precludes the diagnosis of SU.  Fibrosis marker: In a study of 171 Non-Alcoholic Fatty Liver Disease  (NAFLD) patients where 23% had significant NAFLD fibrosis (Metavir  F2-F4) and 11% had cirrhosis by liver biopsy, a fibrosis result of  >0.3 yielded a sensitivity of 83% and a specificity of 78% for the  detection of significant fibrosis.[1]  Steatosis marker: In a population of 2997 patients, where 61% had  significant steatosis (>=5%) on a liver biopsy, a steatosis score  >0.4 had a sensitivity of 79% and a specificity of 50% for  identification of significant steatosis.[2]  SU marker: In a population of 1081 NAFLD patients,  where 51% had  at                            least some SU by liver biopsy, a prediction of SU had a  sensitivity of 72% for identifying SU and a specificity of 71%.[3]    Fibrosis Scorin2023 Comment   Final         <=0.21 = Stage F0 - No fibrosis  0.21 - 0.27 = Stage F0 - F1  0.27 - 0.31 = Stage F1 - Portal fibrosis  0.31 - 0.48 = Stage F1 - F2  0.48 - 0.58 = Stage F2 - Bridging fibrosis with few septa  0.58 - 0.72 = Stage F3 - Bridging fibrosis with many septa  0.72 - 0.74 = Stage F3 - F4        >0.74 = Stage F4 - Cirrhosis    Steatosis Scoring 2023 Comment   Final         <=0.40 = S0  - No Steatosis (<5%)  0.40 - 0.55 = S1  - Mild Steatosis                      (but Clinically Significant) (5-33%)        >0.55 = S2S3- Moderate to Severe Steatosis                      (Clinically Significant) (%)    SU Scoring 2023 Comment   Final         <=0.25 = N0 - No SU  0.25 - 0.50 = N1 - Mild SU  0.50 - 0.75 = N2 - Moderate SU        >0.75 = N3 - Severe SU    Limitations: 2023 Comment   Final    SU FibroSure(R) Plus is recommended for patients with suspected  non-alcoholic fatty liver disease. It is not recommended for  patients with other liver diseases. It is also not recommended  in patients with Gilbert Disease, acute hemolysis, acute viral  hepatitis, drug induced hepatitis, genetic liver disease,  autoimmune hepatitis and/or extra-hepatic cholestasis. Any of  these clinical situations may lead to inaccurate quantitative  predictions of fibrosis.    Comment 2023 Comment   Final    This test was developed and its performance characteristics  determined by [facility]. It has not been cleared or approved  by the Food and Drug Administration.  For questions regarding this report please contact customer service  at [phone].  References:  1.  Holland MUNOZ et al. Diagnostic Value of Biochemical Markers  (FibroTest) for the prediction of Liver Fibrosis in patients  with  Non-Alcoholic Fatty Liver Disease. BMC Gastroenterology 2006; 6:6.  2.  Juma PHAN. et al. The Diagnostic Performance of a Simplified  Blood Test (SteatoTest-2) for the Prediction of Liver Steatosis.  Eur J Gastroenterol Hepatol. 2019; 31:393-402.  3.  Juma PHAN. et al. Diagnostic performance of a new noninvasive  test for nonalcoholic steatohepatitis using a simplified histological  reference. Eur J Gastroenterol Hepatol. 2018 May; 30:569-577.    Mitochondrial Ab 08/09/2023 <20.0  0.0 - 20.0 Units Final                                    Negative    0.0 - 20.0                                  Equivocal  20.1 - 24.9                                  Positive         >24.9  Mitochondrial (M2) Antibodies are found in 90-96% of  patients with primary biliary cirrhosis.    Iron 08/09/2023 95  59 - 158 mcg/dL Final    Iron Saturation (TSAT) 08/09/2023 23  20 - 50 % Final    Transferrin 08/09/2023 278  200 - 360 mg/dL Final    TIBC 08/09/2023 414  298 - 536 mcg/dL Final    Hepatitis B Surface Ag 08/09/2023 Non-Reactive  Non-Reactive Final    Hep A IgM 08/09/2023 Non-Reactive  Non-Reactive Final    Hep B C IgM 08/09/2023 Non-Reactive  Non-Reactive Final    Hepatitis C Ab 08/09/2023 Non-Reactive  Non-Reactive Final    Ferritin 08/09/2023 54.80  30.00 - 400.00 ng/mL Final    ALPHA -1 ANTITRYPSIN 08/09/2023 136  90 - 200 mg/dL Final    WBC 08/09/2023 3.77  3.40 - 10.80 10*3/mm3 Final    RBC 08/09/2023 5.11  4.14 - 5.80 10*6/mm3 Final    Hemoglobin 08/09/2023 15.3  13.0 - 17.7 g/dL Final    Hematocrit 08/09/2023 45.2  37.5 - 51.0 % Final    MCV 08/09/2023 88.5  79.0 - 97.0 fL Final    MCH 08/09/2023 29.9  26.6 - 33.0 pg Final    MCHC 08/09/2023 33.8  31.5 - 35.7 g/dL Final    RDW 08/09/2023 14.0  12.3 - 15.4 % Final    RDW-SD 08/09/2023 44.6  37.0 - 54.0 fl Final    MPV 08/09/2023 10.3  6.0 - 12.0 fL Final    Platelets 08/09/2023 235  140 - 450 10*3/mm3 Final    Neutrophil % 08/09/2023 46.6  42.7 - 76.0 % Final     Lymphocyte % 08/09/2023 38.5  19.6 - 45.3 % Final    Monocyte % 08/09/2023 8.0  5.0 - 12.0 % Final    Eosinophil % 08/09/2023 5.0  0.3 - 6.2 % Final    Basophil % 08/09/2023 1.6 (H)  0.0 - 1.5 % Final    Immature Grans % 08/09/2023 0.3  0.0 - 0.5 % Final    Neutrophils, Absolute 08/09/2023 1.76  1.70 - 7.00 10*3/mm3 Final    Lymphocytes, Absolute 08/09/2023 1.45  0.70 - 3.10 10*3/mm3 Final    Monocytes, Absolute 08/09/2023 0.30  0.10 - 0.90 10*3/mm3 Final    Eosinophils, Absolute 08/09/2023 0.19  0.00 - 0.40 10*3/mm3 Final    Basophils, Absolute 08/09/2023 0.06  0.00 - 0.20 10*3/mm3 Final    Immature Grans, Absolute 08/09/2023 0.01  0.00 - 0.05 10*3/mm3 Final    nRBC 08/09/2023 0.0  0.0 - 0.2 /100 WBC Final        Procedure:       Assessment/Plan:   Phimosis-tight unable to do cystoscopy we will set him up for circumcision and cystoscopy in the near future.      This document has been electronically signed by HATTIE JUDD MD September 19, 2023 08:43 EDT    Dictated Utilizing Dragon Dictation: Part of this note may be an electronic transcription/translation of spoken language to printed text using the Dragon Dictation System.

## 2023-09-20 PROBLEM — N47.1 PHIMOSIS: Status: ACTIVE | Noted: 2023-09-20

## 2023-09-21 DIAGNOSIS — N40.1 BENIGN PROSTATIC HYPERPLASIA WITH URINARY HESITANCY: ICD-10-CM

## 2023-09-21 DIAGNOSIS — N47.1 PHIMOSIS: Primary | ICD-10-CM

## 2023-09-21 DIAGNOSIS — R39.11 BENIGN PROSTATIC HYPERPLASIA WITH URINARY HESITANCY: ICD-10-CM

## 2023-09-21 RX ORDER — GENTAMICIN SULFATE 80 MG/100ML
80 INJECTION, SOLUTION INTRAVENOUS ONCE
OUTPATIENT
Start: 2023-09-21 | End: 2023-09-21

## 2023-09-27 ENCOUNTER — APPOINTMENT (OUTPATIENT)
Dept: CT IMAGING | Facility: HOSPITAL | Age: 53
End: 2023-09-27
Payer: MEDICARE

## 2023-09-27 ENCOUNTER — HOSPITAL ENCOUNTER (OUTPATIENT)
Dept: ULTRASOUND IMAGING | Facility: HOSPITAL | Age: 53
Discharge: HOME | End: 2023-09-27
Payer: MEDICARE

## 2023-09-27 ENCOUNTER — TELEPHONE (OUTPATIENT)
Dept: GASTROENTEROLOGY | Facility: CLINIC | Age: 53
End: 2023-09-27
Payer: MEDICARE

## 2023-09-27 ENCOUNTER — HOSPITAL ENCOUNTER (EMERGENCY)
Facility: HOSPITAL | Age: 53
Discharge: HOME | End: 2023-09-27
Attending: STUDENT IN AN ORGANIZED HEALTH CARE EDUCATION/TRAINING PROGRAM | Admitting: STUDENT IN AN ORGANIZED HEALTH CARE EDUCATION/TRAINING PROGRAM
Payer: MEDICARE

## 2023-09-27 VITALS
WEIGHT: 189 LBS | TEMPERATURE: 98.3 F | BODY MASS INDEX: 26.46 KG/M2 | SYSTOLIC BLOOD PRESSURE: 136 MMHG | HEIGHT: 71 IN | OXYGEN SATURATION: 97 % | DIASTOLIC BLOOD PRESSURE: 90 MMHG | RESPIRATION RATE: 18 BRPM | HEART RATE: 77 BPM

## 2023-09-27 VITALS
HEART RATE: 60 BPM | RESPIRATION RATE: 20 BRPM | DIASTOLIC BLOOD PRESSURE: 124 MMHG | SYSTOLIC BLOOD PRESSURE: 177 MMHG | OXYGEN SATURATION: 99 %

## 2023-09-27 DIAGNOSIS — K76.0 FATTY LIVER: ICD-10-CM

## 2023-09-27 DIAGNOSIS — R10.11 RUQ PAIN: Primary | ICD-10-CM

## 2023-09-27 LAB
ALBUMIN SERPL-MCNC: 4.5 G/DL (ref 3.5–5.2)
ALBUMIN/GLOB SERPL: 1.7 G/DL
ALP SERPL-CCNC: 99 U/L (ref 39–117)
ALT SERPL W P-5'-P-CCNC: 22 U/L (ref 1–41)
ANION GAP SERPL CALCULATED.3IONS-SCNC: 10.9 MMOL/L (ref 5–15)
APTT PPP: 25.2 SECONDS (ref 26.5–34.5)
AST SERPL-CCNC: 21 U/L (ref 1–40)
BASOPHILS # BLD AUTO: 0.08 10*3/MM3 (ref 0–0.2)
BASOPHILS NFR BLD AUTO: 1.2 % (ref 0–1.5)
BILIRUB SERPL-MCNC: 0.8 MG/DL (ref 0–1.2)
BUN SERPL-MCNC: 22 MG/DL (ref 6–20)
BUN/CREAT SERPL: 16.1 (ref 7–25)
CALCIUM SPEC-SCNC: 9.5 MG/DL (ref 8.6–10.5)
CHLORIDE SERPL-SCNC: 102 MMOL/L (ref 98–107)
CO2 SERPL-SCNC: 22.1 MMOL/L (ref 22–29)
CREAT SERPL-MCNC: 1.37 MG/DL (ref 0.76–1.27)
DEPRECATED RDW RBC AUTO: 41.8 FL (ref 37–54)
EGFRCR SERPLBLD CKD-EPI 2021: 61.3 ML/MIN/1.73
EOSINOPHIL # BLD AUTO: 0.21 10*3/MM3 (ref 0–0.4)
EOSINOPHIL NFR BLD AUTO: 3.2 % (ref 0.3–6.2)
ERYTHROCYTE [DISTWIDTH] IN BLOOD BY AUTOMATED COUNT: 13 % (ref 12.3–15.4)
GLOBULIN UR ELPH-MCNC: 2.7 GM/DL
GLUCOSE SERPL-MCNC: 154 MG/DL (ref 65–99)
HCT VFR BLD AUTO: 47.2 % (ref 37.5–51)
HGB BLD-MCNC: 15.9 G/DL (ref 13–17.7)
HOLD SPECIMEN: NORMAL
HOLD SPECIMEN: NORMAL
IMM GRANULOCYTES # BLD AUTO: 0.01 10*3/MM3 (ref 0–0.05)
IMM GRANULOCYTES NFR BLD AUTO: 0.2 % (ref 0–0.5)
INR PPP: 0.95 (ref 0.9–1.1)
INR PPP: 0.95 (ref 0.9–1.1)
LYMPHOCYTES # BLD AUTO: 2.53 10*3/MM3 (ref 0.7–3.1)
LYMPHOCYTES NFR BLD AUTO: 38.4 % (ref 19.6–45.3)
MCH RBC QN AUTO: 29.7 PG (ref 26.6–33)
MCHC RBC AUTO-ENTMCNC: 33.7 G/DL (ref 31.5–35.7)
MCV RBC AUTO: 88.2 FL (ref 79–97)
MONOCYTES # BLD AUTO: 0.62 10*3/MM3 (ref 0.1–0.9)
MONOCYTES NFR BLD AUTO: 9.4 % (ref 5–12)
NEUTROPHILS NFR BLD AUTO: 3.14 10*3/MM3 (ref 1.7–7)
NEUTROPHILS NFR BLD AUTO: 47.6 % (ref 42.7–76)
NRBC BLD AUTO-RTO: 0 /100 WBC (ref 0–0.2)
PLATELET # BLD AUTO: 212 10*3/MM3 (ref 140–450)
PLATELET # BLD AUTO: 232 10*3/MM3 (ref 140–450)
PMV BLD AUTO: 9.5 FL (ref 6–12)
POTASSIUM SERPL-SCNC: 4.4 MMOL/L (ref 3.5–5.2)
PROT SERPL-MCNC: 7.2 G/DL (ref 6–8.5)
PROTHROMBIN TIME: 13.1 SECONDS (ref 12.1–14.7)
PROTHROMBIN TIME: 13.1 SECONDS (ref 12.1–14.7)
RBC # BLD AUTO: 5.35 10*6/MM3 (ref 4.14–5.8)
SODIUM SERPL-SCNC: 135 MMOL/L (ref 136–145)
WBC NRBC COR # BLD: 6.59 10*3/MM3 (ref 3.4–10.8)
WHOLE BLOOD HOLD COAG: NORMAL
WHOLE BLOOD HOLD SPECIMEN: NORMAL

## 2023-09-27 PROCEDURE — 96374 THER/PROPH/DIAG INJ IV PUSH: CPT

## 2023-09-27 PROCEDURE — 80053 COMPREHEN METABOLIC PANEL: CPT | Performed by: STUDENT IN AN ORGANIZED HEALTH CARE EDUCATION/TRAINING PROGRAM

## 2023-09-27 PROCEDURE — 25010000002 HYDROMORPHONE 1 MG/ML SOLUTION: Performed by: STUDENT IN AN ORGANIZED HEALTH CARE EDUCATION/TRAINING PROGRAM

## 2023-09-27 PROCEDURE — 85730 THROMBOPLASTIN TIME PARTIAL: CPT | Performed by: STUDENT IN AN ORGANIZED HEALTH CARE EDUCATION/TRAINING PROGRAM

## 2023-09-27 PROCEDURE — 25010000002 HYDROMORPHONE PER 4 MG: Performed by: STUDENT IN AN ORGANIZED HEALTH CARE EDUCATION/TRAINING PROGRAM

## 2023-09-27 PROCEDURE — 25510000001 IOPAMIDOL 61 % SOLUTION: Performed by: STUDENT IN AN ORGANIZED HEALTH CARE EDUCATION/TRAINING PROGRAM

## 2023-09-27 PROCEDURE — 96375 TX/PRO/DX INJ NEW DRUG ADDON: CPT

## 2023-09-27 PROCEDURE — 25010000002 MORPHINE PER 10 MG: Performed by: SURGERY

## 2023-09-27 PROCEDURE — 96376 TX/PRO/DX INJ SAME DRUG ADON: CPT

## 2023-09-27 PROCEDURE — 88307 TISSUE EXAM BY PATHOLOGIST: CPT

## 2023-09-27 PROCEDURE — 85610 PROTHROMBIN TIME: CPT | Performed by: RADIOLOGY

## 2023-09-27 PROCEDURE — 85610 PROTHROMBIN TIME: CPT | Performed by: STUDENT IN AN ORGANIZED HEALTH CARE EDUCATION/TRAINING PROGRAM

## 2023-09-27 PROCEDURE — 25010000002 ONDANSETRON PER 1 MG: Performed by: STUDENT IN AN ORGANIZED HEALTH CARE EDUCATION/TRAINING PROGRAM

## 2023-09-27 PROCEDURE — 85025 COMPLETE CBC W/AUTO DIFF WBC: CPT | Performed by: STUDENT IN AN ORGANIZED HEALTH CARE EDUCATION/TRAINING PROGRAM

## 2023-09-27 PROCEDURE — 36415 COLL VENOUS BLD VENIPUNCTURE: CPT

## 2023-09-27 PROCEDURE — 99285 EMERGENCY DEPT VISIT HI MDM: CPT

## 2023-09-27 PROCEDURE — 76942 ECHO GUIDE FOR BIOPSY: CPT

## 2023-09-27 PROCEDURE — 74177 CT ABD & PELVIS W/CONTRAST: CPT

## 2023-09-27 PROCEDURE — 85049 AUTOMATED PLATELET COUNT: CPT | Performed by: RADIOLOGY

## 2023-09-27 RX ORDER — MORPHINE SULFATE 2 MG/ML
1 INJECTION, SOLUTION INTRAMUSCULAR; INTRAVENOUS ONCE
Status: COMPLETED | OUTPATIENT
Start: 2023-09-27 | End: 2023-09-27

## 2023-09-27 RX ORDER — ONDANSETRON 2 MG/ML
4 INJECTION INTRAMUSCULAR; INTRAVENOUS ONCE
Status: COMPLETED | OUTPATIENT
Start: 2023-09-27 | End: 2023-09-27

## 2023-09-27 RX ORDER — SODIUM CHLORIDE 0.9 % (FLUSH) 0.9 %
10 SYRINGE (ML) INJECTION AS NEEDED
Status: DISCONTINUED | OUTPATIENT
Start: 2023-09-27 | End: 2023-09-27 | Stop reason: HOSPADM

## 2023-09-27 RX ORDER — HYDROMORPHONE HYDROCHLORIDE 1 MG/ML
0.5 INJECTION, SOLUTION INTRAMUSCULAR; INTRAVENOUS; SUBCUTANEOUS ONCE
Status: COMPLETED | OUTPATIENT
Start: 2023-09-27 | End: 2023-09-27

## 2023-09-27 RX ADMIN — MORPHINE SULFATE 1 MG: 2 INJECTION, SOLUTION INTRAMUSCULAR; INTRAVENOUS at 09:52

## 2023-09-27 RX ADMIN — ONDANSETRON 4 MG: 2 INJECTION INTRAMUSCULAR; INTRAVENOUS at 10:15

## 2023-09-27 RX ADMIN — IOPAMIDOL 100 ML: 612 INJECTION, SOLUTION INTRAVENOUS at 12:00

## 2023-09-27 RX ADMIN — ONDANSETRON 4 MG: 2 INJECTION INTRAMUSCULAR; INTRAVENOUS at 10:40

## 2023-09-27 RX ADMIN — HYDROMORPHONE HYDROCHLORIDE 0.5 MG: 1 INJECTION, SOLUTION INTRAMUSCULAR; INTRAVENOUS; SUBCUTANEOUS at 10:41

## 2023-09-27 RX ADMIN — HYDROMORPHONE HYDROCHLORIDE 1 MG: 1 INJECTION, SOLUTION INTRAMUSCULAR; INTRAVENOUS; SUBCUTANEOUS at 10:15

## 2023-09-27 NOTE — NURSING NOTE
"Assisted patient to cat scan table. Patient remains with complaints of pain 10/10. Patient screaming \"please do something\"   "

## 2023-09-27 NOTE — NURSING NOTE
"Dr. Ron at bedside. Patient declines to be admitted to hospital stating \" I dont want to stay overnight\"  patient  remains with complaint of abdominal pain 10/10 screaming \" please do something I can't take this \"  assisted patient to ER via stretcher per Dr. Ron . Bedside report given.   "

## 2023-09-27 NOTE — ED PROVIDER NOTES
Subjective   History of Present Illness  52-year-old male presents secondary to right upper quadrant abdominal pain status post liver biopsy this morning.  Patient states he has been having some pain however no severe pain.  His pain became severe after liver biopsy.  He has also had nausea vomiting which is new.  Patient denies any fever.  He states he was feeling fine prior.  He denies any chest pain pressure tightness squeezing.  No shortness of breath.  No other complaints.  He has past medical history of asthma, potential fatty liver disease, low back pain and sleep apnea.  He presents by private vehicle.    Review of Systems   Constitutional: Negative.  Negative for fever.   HENT: Negative.     Respiratory: Negative.     Cardiovascular: Negative.  Negative for chest pain.   Gastrointestinal:  Positive for abdominal pain, nausea and vomiting.   Endocrine: Negative.    Genitourinary: Negative.  Negative for dysuria.   Skin: Negative.    Neurological: Negative.    Psychiatric/Behavioral: Negative.     All other systems reviewed and are negative.    Past Medical History:   Diagnosis Date    Headache     Low back pain     Sleep apnea        No Known Allergies    Past Surgical History:   Procedure Laterality Date    [place] MYELOGRAPHY VIA LUMBAR INJECTION RS&I CERVICAL N/A 10/29/2019    Procedure: IR myelogram, cervical;  Surgeon: Jose Daniel Kirby MD;  Location: [facility] INVASIVE LOCATION;  Service: Interventional Radiology       Family History   Problem Relation Age of Onset    Heart disease Mother     Arthritis Mother     Diabetes Mother     Heart disease Father     Arthritis Father        Social History     Socioeconomic History    Marital status:    Tobacco Use    Smoking status: Never    Smokeless tobacco: Never   Vaping Use    Vaping Use: Never used   Substance and Sexual Activity    Alcohol use: No    Drug use: No    Sexual activity: Defer           Objective   Physical Exam  Vitals and nursing note  reviewed.   Constitutional:       General: He is not in acute distress.     Appearance: He is well-developed. He is not diaphoretic.   HENT:      Head: Normocephalic and atraumatic.      Right Ear: External ear normal.      Left Ear: External ear normal.      Nose: Nose normal.   Eyes:      Conjunctiva/sclera: Conjunctivae normal.      Pupils: Pupils are equal, round, and reactive to light.   Neck:      Vascular: No JVD.      Trachea: No tracheal deviation.   Cardiovascular:      Rate and Rhythm: Normal rate and regular rhythm.      Heart sounds: Normal heart sounds. No murmur heard.  Pulmonary:      Effort: Pulmonary effort is normal. No respiratory distress.      Breath sounds: Normal breath sounds. No wheezing.   Abdominal:      General: Bowel sounds are normal.      Palpations: Abdomen is soft.      Tenderness: There is no abdominal tenderness in the right upper quadrant.   Musculoskeletal:         General: No deformity. Normal range of motion.      Cervical back: Normal range of motion and neck supple.   Skin:     General: Skin is warm and dry.      Coloration: Skin is not pale.      Findings: No erythema or rash.   Neurological:      Mental Status: He is alert and oriented to person, place, and time.      Cranial Nerves: No cranial nerve deficit.   Psychiatric:         Behavior: Behavior normal.         Thought Content: Thought content normal.       Procedures           ED Course  ED Course as of 09/27/23 1323   Wed Sep 27, 2023   1015 Reviewed with Dr. Ron immediately after initial evaluation.  Patient had routine liver biopsy and started having excruciating pain.  Patient was evaluated by Dr. Vergara.  He had a noncontrasted CT which was normal.  He recommends CT with contrast. [JI]   1230 Patient has remained asymptomatic for at least an hour and a half.  Repeat CT normal.  Patient was given the option of being admitted for observation overnight.  He prefers to go home at this time.  He was counseled out  red flag warnings and the need to return.  He voices understanding.  He states he got pain medicine at home.  He voices no other complaints this time. [JI]      ED Course User Index  [JI] Brad Thompson PA           Results for orders placed or performed during the hospital encounter of 09/27/23   Comprehensive Metabolic Panel    Specimen: Blood   Result Value Ref Range    Glucose 154 (H) 65 - 99 mg/dL    BUN 22 (H) 6 - 20 mg/dL    Creatinine 1.37 (H) 0.76 - 1.27 mg/dL    Sodium 135 (L) 136 - 145 mmol/L    Potassium 4.4 3.5 - 5.2 mmol/L    Chloride 102 98 - 107 mmol/L    CO2 22.1 22.0 - 29.0 mmol/L    Calcium 9.5 8.6 - 10.5 mg/dL    Total Protein 7.2 6.0 - 8.5 g/dL    Albumin 4.5 3.5 - 5.2 g/dL    ALT (SGPT) 22 1 - 41 U/L    AST (SGOT) 21 1 - 40 U/L    Alkaline Phosphatase 99 39 - 117 U/L    Total Bilirubin 0.8 0.0 - 1.2 mg/dL    Globulin 2.7 gm/dL    A/G Ratio 1.7 g/dL    BUN/Creatinine Ratio 16.1 7.0 - 25.0    Anion Gap 10.9 5.0 - 15.0 mmol/L    eGFR 61.3 >60.0 mL/min/1.73   aPTT    Specimen: Blood   Result Value Ref Range    PTT 25.2 (L) 26.5 - 34.5 seconds   Protime-INR    Specimen: Blood   Result Value Ref Range    Protime 13.1 12.1 - 14.7 Seconds    INR 0.95 0.90 - 1.10   CBC Auto Differential    Specimen: Blood   Result Value Ref Range    WBC 6.59 3.40 - 10.80 10*3/mm3    RBC 5.35 4.14 - 5.80 10*6/mm3    Hemoglobin 15.9 13.0 - 17.7 g/dL    Hematocrit 47.2 37.5 - 51.0 %    MCV 88.2 79.0 - 97.0 fL    MCH 29.7 26.6 - 33.0 pg    MCHC 33.7 31.5 - 35.7 g/dL    RDW 13.0 12.3 - 15.4 %    RDW-SD 41.8 37.0 - 54.0 fl    MPV 9.5 6.0 - 12.0 fL    Platelets 232 140 - 450 10*3/mm3    Neutrophil % 47.6 42.7 - 76.0 %    Lymphocyte % 38.4 19.6 - 45.3 %    Monocyte % 9.4 5.0 - 12.0 %    Eosinophil % 3.2 0.3 - 6.2 %    Basophil % 1.2 0.0 - 1.5 %    Immature Grans % 0.2 0.0 - 0.5 %    Neutrophils, Absolute 3.14 1.70 - 7.00 10*3/mm3    Lymphocytes, Absolute 2.53 0.70 - 3.10 10*3/mm3    Monocytes, Absolute 0.62 0.10 - 0.90  10*3/mm3    Eosinophils, Absolute 0.21 0.00 - 0.40 10*3/mm3    Basophils, Absolute 0.08 0.00 - 0.20 10*3/mm3    Immature Grans, Absolute 0.01 0.00 - 0.05 10*3/mm3    nRBC 0.0 0.0 - 0.2 /100 WBC   Green Top (Gel)   Result Value Ref Range    Extra Tube Hold for add-ons.    Lavender Top   Result Value Ref Range    Extra Tube hold for add-on    Gold Top - SST   Result Value Ref Range    Extra Tube Hold for add-ons.    Light Blue Top   Result Value Ref Range    Extra Tube Hold for add-ons.      CT Abdomen Pelvis With Contrast    Result Date: 9/27/2023    No acute findings in the abdomen or pelvis.   This report was finalized on 9/27/2023 12:29 PM by Dr. Osvaldo Ron MD.      US Guided Liver Biopsy    Result Date: 9/27/2023  Attempted liver biopsy.  This report was finalized on 9/27/2023 10:30 AM by Dr. Osvaldo Ron MD.                                  Medical Decision Making  52-year-old male presents secondary to right upper quadrant abdominal pain status post liver biopsy this morning.  Patient states he has been having some pain however no severe pain.  His pain became severe after liver biopsy.  He has also had nausea vomiting which is new.  Patient denies any fever.  He states he was feeling fine prior.  He denies any chest pain pressure tightness squeezing.  No shortness of breath.  No other complaints.  He has past medical history of asthma, potential fatty liver disease, low back pain and sleep apnea.  He presents by private vehicle.    Problems Addressed:  RUQ pain: complicated acute illness or injury    Amount and/or Complexity of Data Reviewed  Labs: ordered. Decision-making details documented in ED Course.  Radiology: ordered. Decision-making details documented in ED Course.    Risk  Prescription drug management.  Risk Details: Patient was given the option of being admitted for observation overnight.  He preferred to go home.  He was feeling much better.  He was counseled on signs and symptoms worsening  appropriate follow-up.  He is counseled on his diagnostic work-up and labs.  He voices understanding.        Final diagnoses:   RUQ pain       ED Disposition  ED Disposition       ED Disposition   Discharge    Condition   Stable    Comment   --               Gerson Goodman, APRN  [address] [address]  [phone]    Schedule an appointment as soon as possible for a visit       [facility] EMERGENCY DEPARTMENT  [address] [place] [id number] [phone]    If symptoms worsen         Medication List      No changes were made to your prescriptions during this visit.            Brad Thompson PA  09/27/23 [id number]

## 2023-09-27 NOTE — NURSING NOTE
Patient with complaint of abdomen pain 10/10,  Dr. Ron at bedside , patient reports cramping pain to abdomen. Procedure completed, Will continue to monitor patient post procedure.

## 2023-09-27 NOTE — NURSING NOTE
Patient remains with complaint of pain 10/10 after morphine given, assisted patient back to IR room per stretcher.

## 2023-09-27 NOTE — TELEPHONE ENCOUNTER
Per Anna, can we make sure this patient has a follow up with me. He is the one I ordered a liver biopsy on and wanted to see him like ~3 weeks after. He had that biopsy today but I don't see he has a follow up. Thanks

## 2023-09-27 NOTE — NURSING NOTE
"Dr. Ron present while patient in cat scan , referral made to Dr. Vergara in surgery to bedside. Cat scan didn't show abnormalities from liver biopsy per Dr. Ron. Patient reports pain at 10/10 patient screaming \" please do something I need a shot give me something\" Dr. Vergara gave verbal order for morphine as ordered .   "

## 2023-09-28 LAB — REF LAB TEST METHOD: NORMAL

## 2023-10-18 ENCOUNTER — OFFICE VISIT (OUTPATIENT)
Dept: GASTROENTEROLOGY | Facility: CLINIC | Age: 53
End: 2023-10-18
Payer: MEDICARE

## 2023-10-18 VITALS
HEIGHT: 71 IN | HEART RATE: 75 BPM | BODY MASS INDEX: 26.32 KG/M2 | DIASTOLIC BLOOD PRESSURE: 80 MMHG | WEIGHT: 188 LBS | SYSTOLIC BLOOD PRESSURE: 114 MMHG

## 2023-10-18 DIAGNOSIS — K76.0 FATTY LIVER: ICD-10-CM

## 2023-10-18 DIAGNOSIS — K21.9 GASTROESOPHAGEAL REFLUX DISEASE WITHOUT ESOPHAGITIS: Primary | ICD-10-CM

## 2023-10-18 DIAGNOSIS — R10.13 EPIGASTRIC PAIN: ICD-10-CM

## 2023-10-18 PROCEDURE — 1159F MED LIST DOCD IN RCRD: CPT | Performed by: NURSE PRACTITIONER

## 2023-10-18 PROCEDURE — 99214 OFFICE O/P EST MOD 30 MIN: CPT | Performed by: NURSE PRACTITIONER

## 2023-10-18 PROCEDURE — 1160F RVW MEDS BY RX/DR IN RCRD: CPT | Performed by: NURSE PRACTITIONER

## 2023-10-18 RX ORDER — FAMOTIDINE 40 MG/1
40 TABLET, FILM COATED ORAL NIGHTLY PRN
Qty: 30 TABLET | Refills: 5 | Status: SHIPPED | OUTPATIENT
Start: 2023-10-18

## 2023-10-18 RX ORDER — LANSOPRAZOLE 30 MG/1
30 CAPSULE, DELAYED RELEASE ORAL 2 TIMES DAILY
Qty: 60 CAPSULE | Refills: 5 | Status: SHIPPED | OUTPATIENT
Start: 2023-10-18

## 2023-10-18 NOTE — DISCHARGE INSTRUCTIONS
HOLD all diabetic medications the morning of surgery as ordered by physician.    Please discontinue all blood thinners and anticoagulants (except aspirin) prior to surgery as per your surgeon and cardiologist instructions.  Aspirin may be continued up to the day prior to surgery.     10/23/23  ARRIVAL TIME PER DR JUDD'S OFFICE    General Instructions:  Do not eat or drink after midnight: 10/22/23 includes water, mints, or gum. You may brush your teeth.  Dental appliances that are removable must be taken out day of surgery.  Do not smoke, chew tobacco, or drink alcohol 24 hours prior to surgery.  Bring medications in original bottles, any inhalers and if applicable your C-PAP/BI-PAP machine.  Bring any papers given to you in the doctor's office.  Wear clean comfortable clothes and socks.  Do not wear contact lenses or make-up. Bring a case for your glasses if applicable.  Bring crutches or walker if applicable.  Leave all other valuables and jewelry at home.    If you were given a blood bank ID arm band remember to bring it with you the day of surgery.    Preventing a Surgical Site Infection:  Shower the night before surgery (unless instructed other wise) using a fresh bar of anti-bacterial soap (such as Dial) and clean washcloth. Dry with a clean towel and dress in clean clothing.  For 2 to 3 days before surgery, avoid shaving with a razor near where you will have surgery because the razor can irritate skin and make it easier to develop an infection. Ask your surgeon if you will be receiving antibiotics prior to surgery.  Make sure you, your family, and all healthcare providers clear their hands with soap and water or an alcohol-based hand  before caring for you or your wound.  If at all possible, quit smoking as many days before surgery as you can.    Day of surgery:  Upon arrival, a Pre-op nurse and Anesthesiologist will review your health history, obtain vital signs, and answer questions you may have.  The only belongings needed at this time will be your home medications and if applicable your C-PAP/BI-PAP machine. If you are staying overnight your family can leave the rest of your belongings in the car and bring them to your room later. A Pre-op nurse will start an IV and you may receive medication in preparation for surgery, including something to help you relax. Your family will be able to see you in the Pre-op area. While you are in surgery your family should notify the waiting room  if they leave the waiting room area and provide a contact phone number.    Please be aware that surgery does come with discomfort. We want to make every effort to control your discomfort so please discuss any uncontrolled symptoms with your nurse. Your doctor will most likely have prescribed pain medications.    If you are going home after surgery you will receive individualized written care instructions before being discharged. A responsible adult must drive you to and from the hospital on the day of surgery and stay with you for 24 hours.    If you are staying overnight following surgery, you will be transported to your hospital room following the recovery period.  [facility] has all private rooms.    If you have any questions please call Pre-Admission Testing at [phone].  Deductibles and co-payments are collected on the day of service. Please be prepared to pay the required co-pay, deductible or deposit on the day of service as defined by your plan.    A RESPONSIBLE PERSON MUST REMAIN IN THE WAITING ROOM DURING YOUR PROCEDURE AND A RESPONSIBLE  MUST BE AVAILABLE UPON YOUR DISCHARGE.

## 2023-10-18 NOTE — PROGRESS NOTES
DATE:  10/18/2023    DIAGNOSIS: GERD, Fatty liver    CHIEF COMPLAINT:  Follow up of liver biopsy    HPI:  Mr. Linares has been following with Martinez Hurt PA-C and was last seen in May. At that time, planned to start Dexilant 60 mg PO daily. However, his insurance would not cover this. He has therefore been taking nexium 40 mg PO daily and pepcid 40 mg PO twice daily. With this regimen, he continues to have ~2-3 flares/week. He has occasional nausea without vomiting. He also has intermittent epigastric pain. As above, he has had previous EGD with Dr. Dai ~1 year ago. He has no other complaints today.     INTERVAL HISTORY:  Mr. Linares presents today for follow up. Since his last visit, in regards to GERD, he has been taking Lansoprazole 30 mg PO twice daily and Pepcid 40 mg HS. Given continued GERD, he also admits to taking Nexium 40 mg PO daily in addition to the above which has worked well. He retains his gallbladder. At his last visit, EGD was ordered but he did not have this done. We have tried to start Dexilant but his insurance will not cover this medication. In regards to Fatty liver, SU fibrosure showed no steatosis but fibrosis stage F1. Repeat US liver showed diffuse fatty infiltration of liver. CMP showed normal LFTs. The remaining workup was unremarkable. Patient denies history of diabetes, HLD and is without obesity. He does not drink alcohol. When asked about his typical diet, he does report eating out frequently and consuming of high fat diet. Discussed with Dr. Amezcua who recommended US guided liver biopsy given the above workup to confirm fatty liver.  Liver biopsy was attempted on 9/27/23. Unfortunately, patient developed abdominal pain with procedure which required brief monitoring in the ED. He says this later resolved. CT A/P on 9/27/23 was unremarkable. Unfortunately, pathology showed scant fragments of fibrovascular connective tissue and was negative for evidence of liver parenchyma,  therefore inconclusive. Patient has no new complaints today.     PAST MEDICAL HISTORY:  Past Medical History:   Diagnosis Date    Headache     Low back pain     Sleep apnea        PAST SURGICAL HISTORY:  Past Surgical History:   Procedure Laterality Date    IL MYELOGRAPHY VIA LUMBAR INJECTION RS&I CERVICAL N/A 10/29/2019    Procedure: IR myelogram, cervical;  Surgeon: Jose Daniel Kirby MD;  Location: [facility] INVASIVE LOCATION;  Service: Interventional Radiology       SOCIAL HISTORY:  Social History     Socioeconomic History    Marital status:    Tobacco Use    Smoking status: Never    Smokeless tobacco: Never   Vaping Use    Vaping Use: Never used   Substance and Sexual Activity    Alcohol use: No    Drug use: No    Sexual activity: Defer       FAMILY HISTORY:  Family History   Problem Relation Age of Onset    Heart disease Mother     Arthritis Mother     Diabetes Mother     Heart disease Father     Arthritis Father        MEDICATIONS:  The current medication list was reviewed in the EMR    Current Outpatient Medications:     albuterol sulfate  (90 Base) MCG/ACT inhaler, Inhale 2 puffs Every 4 (Four) Hours As Needed for Wheezing., Disp: 18 g, Rfl: 8    baclofen (LIORESAL) 10 MG tablet, Take 1 tablet by mouth 2 (Two) Times a Day., Disp: , Rfl:     benzonatate (TESSALON) 100 MG capsule, Take 1 capsule by mouth 3 (Three) Times a Day As Needed for Cough., Disp: 42 capsule, Rfl: 6    budesonide-formoterol (Symbicort) 160-4.5 MCG/ACT inhaler, Inhale 2 puffs 2 (Two) Times a Day., Disp: 1 each, Rfl: 8    busPIRone (BUSPAR) 30 MG tablet, Take 1 tablet by mouth Daily., Disp: , Rfl:     clonazePAM (KlonoPIN) 0.5 MG tablet, Take 1 tablet by mouth 2 (Two) Times a Day As Needed., Disp: , Rfl:     diclofenac (VOLTAREN) 75 MG EC tablet, Take 1 tablet by mouth 2 (Two) Times a Day As Needed., Disp: , Rfl:     DULoxetine (CYMBALTA) 60 MG capsule, Take 1 capsule by mouth 2 (Two) Times a Day., Disp: , Rfl:      famotidine (Pepcid) 40 MG tablet, Take 1 tablet by mouth At Night As Needed for Heartburn., Disp: 30 tablet, Rfl: 5    gabapentin (NEURONTIN) 600 MG tablet, Take 1 tablet by mouth 3 (Three) Times a Day., Disp: , Rfl:     HYDROcodone-acetaminophen (NORCO)  MG per tablet, Take 1 tablet by mouth 3 (Three) Times a Day., Disp: , Rfl:     hydrOXYzine (ATARAX) 50 MG tablet, Take 1 tablet by mouth 2 (Two) Times a Day., Disp: , Rfl:     methocarbamol (ROBAXIN) 750 MG tablet, Take 1 tablet by mouth 4 (Four) Times a Day As Needed for Muscle Spasms., Disp: , Rfl:     montelukast (SINGULAIR) 10 MG tablet, Take 1 tablet by mouth Every Night., Disp: 30 tablet, Rfl: 5    naloxone (NARCAN) 4 MG/0.1ML nasal spray, 1 spray into the nostril(s) as directed by provider., Disp: , Rfl:     OLANZapine (zyPREXA) 7.5 MG tablet, Take 1 tablet by mouth Every Night., Disp: , Rfl:     propranolol (INDERAL) 20 MG tablet, Take 2 tablets by mouth 2 (Two) Times a Day., Disp: , Rfl:     QUEtiapine (SEROquel) 100 MG tablet, Take 1 tablet by mouth Every Night., Disp: , Rfl:     rOPINIRole (REQUIP) 4 MG tablet, Take 1 tablet by mouth Every Night., Disp: , Rfl:     terazosin (HYTRIN) 5 MG capsule, Take 1 capsule by mouth Every Night., Disp: 30 capsule, Rfl: 3    Tezepelumab-ekko (Tezspire) 210 MG/1.91ML injection, Inject 210 mg under the skin into the appropriate area as directed Every 28 (Twenty-Eight) Days., Disp: 1.91 mL, Rfl: 5    Tiotropium Bromide Monohydrate (Spiriva Respimat) 1.25 MCG/ACT aerosol solution inhaler, Inhale 2 puffs Daily., Disp: 4 g, Rfl: 8    ALLERGIES:  No Known Allergies      REVIEW OF SYSTEMS:    A comprehensive 14 point review of systems was performed.  Significant findings as mentioned above.  All other systems reviewed and are negative.        Physical Exam   Vital Signs:   Vitals:    10/18/23 1101   BP: 114/80   Pulse: 75     General: Well developed, well nourished, alert and oriented x 3, in no acute distress.   Head:  ATNC   Eyes: PERRL, No evidence of conjunctivitis.   Nose: No nasal discharge.   Mouth: Oral mucosal membranes moist. No oral ulceration or hemorrhages.   Neck: Neck supple. No thyromegaly. No JVD.   Lungs: Clear in all fields to A&P without rales, rhonchi or wheezing.   Heart: RRR. No murmurs, rubs, or gallops.   Abdomen: Soft. Bowel sounds are normoactive. Nontender with palpation.   Extremities: No cyanosis or edema.   Neurologic: MS as above. Grossly non focal exam.      RECENT LABS:  Lab Results   Component Value Date    WBC 6.59 09/27/2023    HGB 15.9 09/27/2023    HCT 47.2 09/27/2023    MCV 88.2 09/27/2023    RDW 13.0 09/27/2023     09/27/2023    NEUTRORELPCT 47.6 09/27/2023    LYMPHORELPCT 38.4 09/27/2023    MONORELPCT 9.4 09/27/2023    EOSRELPCT 3.2 09/27/2023    BASORELPCT 1.2 09/27/2023    NEUTROABS 3.14 09/27/2023    LYMPHSABS 2.53 09/27/2023   Pathology:          IMAGING:  CT Abdomen Pelvis With Contrast    Result Date: 9/27/2023    No acute findings in the abdomen or pelvis.   This report was finalized on 9/27/2023 12:29 PM by Dr. Osvaldo Ron MD.      US Guided Liver Biopsy    Result Date: 9/27/2023  Attempted liver biopsy.  This report was finalized on 9/27/2023 10:30 AM by Dr. Osvaldo Ron MD.      US Liver    Result Date: 9/8/2023    Diffuse fatty infiltration of liver. Otherwise unremarkable exam.  This report was finalized on 9/8/2023 9:33 AM by Dr. Bradley Carr MD.        3/8/21  Narrative & Impression   CT ABDOMEN WITH CONTRAST-     REASON FOR EXAM: R10.12; R10.12-Left upper quadrant pain.     COMPARISON: None.     TECHNIQUE: Contrast was administered orally and intravenously. Spiral  scans were obtained from the lung bases and extended to the upper  pelvis.     CT FINDINGS: The images that include the bases of the lung showed no  parenchymal infiltrates or pleural effusions in the lung bases. The  liver shows diffuse decreased density consistent with fatty  infiltration. The liver  has a Hounsfield density reading of 65 as  compared to 116 for the spleen. No focal lesions were demonstrated. The  pancreas showed no evidence of mass or inflammation. The adrenal glands  were unremarkable. The kidneys show normal cortical enhancement. There  was no ascites. The aorta is normal in caliber. There were no enlarged  lymph nodes in the retroperitoneum or mesentery. The bowel shows no  evidence of obstruction or inflammation. There were no ventral hernias.     IMPRESSION:  Mild diffuse fatty changes are noted throughout the liver. A  source of the patient's left upper quadrant pain is not demonstrated.     ASSESSMENT & PLAN:  Reji Linares is a very pleasant 52 y.o. male with    1.  GERD:  -He has previously tried Nexium and omeprazole with poorly controlled symptoms.   -Therefore, most recently, we started him on Lansoprazole 30 mg PO twice daily in addition to Pepcid 40 mg PO HS. He retains his gallbladder.  -Recommended repeat EGD to further evaluate. After discussing the risks/benefits, patient was agreeable. This was scheduled but not completed.   -Since his last visit, he has been taking Lansoprazole 30 mg PO twice daily and Pepcid 40 mg HS. He also admits to taking Nexium 40 mg PO daily and currently denies flares.   -Rx for Dexilant 60 mg PO daily was previously provided. However, his insurance will not cover this.   -Recommended he continue lansoprazole 30 mg twice daily and pepcid. He should not take Nexium with lansoprazole. Strongly recommended we obtain EGD and patient is agreeable. Will reschedule. In the meantime, we discussed dietary modifications to help better control symptoms.   -RTC following EGD.     2. Fatty liver:  -This was noted on CT imaging in March 2021.   -Obtained workup including SU fibrosure which showed no steatosis but fibrosis stage F1. Repeat US liver showed diffuse fatty infiltration of liver. CMP showed normal LFTs. The remaining workup was unremarkable.  Patient denies history of diabetes, HLD and is without obesity. He does not drink alcohol. When asked about his typical diet, he does report eating out frequently and consuming of high fat diet. Discussed with Dr. Amezcua who recommended US guided liver biopsy given the above workup to confirm fatty liver.  Liver biopsy was attempted on 9/27/23. Unfortunately, patient developed abdominal pain with procedure which required brief monitoring in the ED. He says this later resolved. CT A/P on 9/27/23 was unremarkable. Unfortunately, pathology showed scant fragments of fibrovascular connective tissue and was negative for evidence of liver parenchyma, therefore inconclusive.   -We discussed the above in clinic today and will monitor as fatty liver.   -Again recommended tight control of the patient's blood sugar, cholesterol, triglycerides and weight as this is the best way to intervene and prevent ongoing liver damage as much as possible.   -Discussed the importance of following a healthy low-fat diet and was encouraged to incorporate exercise into lifestyle. He will have ultrasound of the liver and hepatic panel every 6 months for monitoring.       Electronically Signed by: MERLIN Anderson , October 18, 2023 11:15 EDT       CC:   Gerson Goodman APRN

## 2023-10-19 ENCOUNTER — OFFICE VISIT (OUTPATIENT)
Dept: PULMONOLOGY | Facility: CLINIC | Age: 53
End: 2023-10-19
Payer: MEDICARE

## 2023-10-19 VITALS
SYSTOLIC BLOOD PRESSURE: 102 MMHG | HEART RATE: 78 BPM | OXYGEN SATURATION: 98 % | HEIGHT: 70 IN | DIASTOLIC BLOOD PRESSURE: 78 MMHG | BODY MASS INDEX: 27.2 KG/M2 | TEMPERATURE: 97.3 F | WEIGHT: 190 LBS

## 2023-10-19 DIAGNOSIS — R42 VERTIGO: ICD-10-CM

## 2023-10-19 DIAGNOSIS — G47.33 OBSTRUCTIVE SLEEP APNEA: ICD-10-CM

## 2023-10-19 DIAGNOSIS — J45.50 SEVERE PERSISTENT ASTHMA WITHOUT COMPLICATION: Primary | ICD-10-CM

## 2023-10-19 RX ORDER — AMOXICILLIN 500 MG/1
500 CAPSULE ORAL 2 TIMES DAILY
Qty: 14 CAPSULE | Refills: 0 | Status: SHIPPED | OUTPATIENT
Start: 2023-10-19 | End: 2023-10-20

## 2023-10-19 NOTE — PROGRESS NOTES
"Chief Complaint  Sleep Apnea and Asthma    Subjective        Reji Jean Linares presents to [facility]MON[facility]INE  History of Present Illness      Mr. Linares presents today for follow up of asthma, sleep apnea.   Asthma symptoms have been fairly well controlled with the continued regimen of inhalers, tezspire, and tessalon capsules PRN for coughing (this has been the only alleviating factor thus far. Remains compliant with autopap device, no acute issues with use.   Admits today to some vertigo like episodes recently (intermittently noted) that have even resulted in falling on occasion. No known aggravating factors.   Otherwise feels that he is doing well.       Objective   Vital Signs:  /78   Pulse 78   Temp 97.3 °F (36.3 °C) (Temporal)   Ht 177.8 cm (70\")   Wt 86.2 kg (190 lb)   SpO2 98%   BMI 27.26 kg/m²   Estimated body mass index is 27.26 kg/m² as calculated from the following:    Height as of this encounter: 177.8 cm (70\").    Weight as of this encounter: 86.2 kg (190 lb).         Physical Exam  Vitals reviewed.   Constitutional:       General: He is not in acute distress.     Appearance: He is well-developed. He is not diaphoretic.   HENT:      Head: Normocephalic and atraumatic.      Comments: Cerumen of both ear canals, not completed impacted. Left TM better visualized than the right. No erythema, but cloudy appearance noted.   Cardiovascular:      Rate and Rhythm: Normal rate and regular rhythm.   Pulmonary:      Effort: Pulmonary effort is normal.      Breath sounds: No wheezing, rhonchi or rales.   Neurological:      Mental Status: He is alert and oriented to person, place, and time.   Psychiatric:         Behavior: Behavior normal.        Result Review :  The following data was reviewed by: Marielle Kunz PA-C on 10/19/2023:    Reviewed CT chest high resolution imaging/report from 2019.   Reviewed previous PFT results.          Assessment and Plan "   Diagnoses and all orders for this visit:    1. Severe persistent asthma without complication (Primary)  -     Tezepelumab-ekko (TEZSPIRE) injection 210 mg    2. Obstructive sleep apnea    3. Vertigo  -     Ambulatory Referral to ENT (Otolaryngology)    Other orders  -     Discontinue: amoxicillin (AMOXIL) 500 MG capsule; Take 1 capsule by mouth 2 (Two) Times a Day for 7 days.  Dispense: 14 capsule; Refill: 0        Severe persistent asthma:   Continue albuterol inhaler as needed  Continue symbicort 160 mcg as scheduled (or less as tolerated)  Continue spiriva respimat 1.25 mcg as scheduled  Continue singulair at nighttime.   Continue tezspire injections - less flare ups noted since starting  Still has some persistent cough despite all medication trials.   Uses tessalon capsules as needed  CT high resolution previously completed, non-significant.   Will consider follow up as needed per symptom changes (or reconsider follow up in the next year or two due to occasional cough).         Obstructive sleep apnea:   Remains compliant with autopap device. No acute complications with use.     Management obstructive sleep apnea also includes lifestyle modifications including weight loss, avoidance of alcohol, sedated, caffeine especially before bedtime, allowing adequate sleep time, and body position during sleep such as side versus back. 10% weight loss can result in a 50% improvement of the apnea-hypopnea index.  Untreated sleep apnea can lead to cardiovascular/metabolic disorder, neurocognitive deficit, daytime sleepiness, motor vehicle accidents, depression, mood disorders and reduced quality of life.  Patient understands the risk of untreated obstructive sleep apnea and benefit benefits of the treatment. Recommended at least 4 hours of use per night for 70% of every month (approximately 21 out of 30 days) per CMS guidelines.         Vertigo:   Ordered 7 day course of amoxicillin in case of inner ear infection  Will  refer to ENT (discussed may benefit from evaluation, medication, and/or PT referral)      Follow Up   Return in about 6 months (around 4/19/2024).  Patient was given instructions and counseling regarding his condition or for health maintenance advice. Please see specific information pulled into the AVS if appropriate.

## 2023-10-20 ENCOUNTER — PRE-ADMISSION TESTING (OUTPATIENT)
Dept: PREADMISSION TESTING | Facility: HOSPITAL | Age: 53
End: 2023-10-20
Payer: MEDICARE

## 2023-10-20 PROCEDURE — 93005 ELECTROCARDIOGRAM TRACING: CPT

## 2023-10-22 LAB
QT INTERVAL: 374 MS
QTC INTERVAL: 403 MS

## 2023-10-23 ENCOUNTER — ANESTHESIA EVENT (OUTPATIENT)
Dept: PERIOP | Facility: HOSPITAL | Age: 53
End: 2023-10-23
Payer: MEDICARE

## 2023-10-23 ENCOUNTER — HOSPITAL ENCOUNTER (OUTPATIENT)
Facility: HOSPITAL | Age: 53
Setting detail: HOSPITAL OUTPATIENT SURGERY
Discharge: HOME | End: 2023-10-23
Attending: UROLOGY | Admitting: UROLOGY
Payer: MEDICARE

## 2023-10-23 ENCOUNTER — APPOINTMENT (OUTPATIENT)
Dept: GENERAL RADIOLOGY | Facility: HOSPITAL | Age: 53
End: 2023-10-23
Payer: MEDICARE

## 2023-10-23 ENCOUNTER — ANESTHESIA (OUTPATIENT)
Dept: PERIOP | Facility: HOSPITAL | Age: 53
End: 2023-10-23
Payer: MEDICARE

## 2023-10-23 VITALS
SYSTOLIC BLOOD PRESSURE: 132 MMHG | RESPIRATION RATE: 12 BRPM | BODY MASS INDEX: 26.63 KG/M2 | HEIGHT: 70 IN | WEIGHT: 186 LBS | TEMPERATURE: 97.9 F | HEART RATE: 68 BPM | DIASTOLIC BLOOD PRESSURE: 85 MMHG | OXYGEN SATURATION: 96 %

## 2023-10-23 DIAGNOSIS — R39.11 BENIGN PROSTATIC HYPERPLASIA WITH URINARY HESITANCY: ICD-10-CM

## 2023-10-23 DIAGNOSIS — N40.1 BENIGN PROSTATIC HYPERPLASIA WITH URINARY HESITANCY: ICD-10-CM

## 2023-10-23 DIAGNOSIS — N47.1 PHIMOSIS: ICD-10-CM

## 2023-10-23 PROCEDURE — 25810000003 LACTATED RINGERS PER 1000 ML: Performed by: ANESTHESIOLOGY

## 2023-10-23 PROCEDURE — 25010000002 ONDANSETRON PER 1 MG: Performed by: NURSE ANESTHETIST, CERTIFIED REGISTERED

## 2023-10-23 PROCEDURE — 25010000002 GENTAMICIN PER 80 MG

## 2023-10-23 PROCEDURE — 25010000002 MIDAZOLAM PER 1 MG: Performed by: NURSE ANESTHETIST, CERTIFIED REGISTERED

## 2023-10-23 PROCEDURE — 25010000002 PROPOFOL 200 MG/20ML EMULSION: Performed by: NURSE ANESTHETIST, CERTIFIED REGISTERED

## 2023-10-23 PROCEDURE — 25010000002 FENTANYL CITRATE (PF) 50 MCG/ML SOLUTION: Performed by: NURSE ANESTHETIST, CERTIFIED REGISTERED

## 2023-10-23 RX ORDER — MEPERIDINE HYDROCHLORIDE 25 MG/ML
12.5 INJECTION INTRAMUSCULAR; INTRAVENOUS; SUBCUTANEOUS
Status: DISCONTINUED | OUTPATIENT
Start: 2023-10-23 | End: 2023-10-23 | Stop reason: HOSPADM

## 2023-10-23 RX ORDER — LIDOCAINE HYDROCHLORIDE AND EPINEPHRINE 10; 10 MG/ML; UG/ML
INJECTION, SOLUTION INFILTRATION; PERINEURAL AS NEEDED
Status: DISCONTINUED | OUTPATIENT
Start: 2023-10-23 | End: 2023-10-23 | Stop reason: HOSPADM

## 2023-10-23 RX ORDER — ONDANSETRON 2 MG/ML
4 INJECTION INTRAMUSCULAR; INTRAVENOUS AS NEEDED
Status: DISCONTINUED | OUTPATIENT
Start: 2023-10-23 | End: 2023-10-23 | Stop reason: HOSPADM

## 2023-10-23 RX ORDER — SODIUM CHLORIDE, SODIUM LACTATE, POTASSIUM CHLORIDE, CALCIUM CHLORIDE 600; 310; 30; 20 MG/100ML; MG/100ML; MG/100ML; MG/100ML
125 INJECTION, SOLUTION INTRAVENOUS ONCE
Status: COMPLETED | OUTPATIENT
Start: 2023-10-23 | End: 2023-10-23

## 2023-10-23 RX ORDER — IPRATROPIUM BROMIDE AND ALBUTEROL SULFATE 2.5; .5 MG/3ML; MG/3ML
3 SOLUTION RESPIRATORY (INHALATION) ONCE AS NEEDED
Status: DISCONTINUED | OUTPATIENT
Start: 2023-10-23 | End: 2023-10-23 | Stop reason: HOSPADM

## 2023-10-23 RX ORDER — OXYCODONE HYDROCHLORIDE AND ACETAMINOPHEN 5; 325 MG/1; MG/1
1 TABLET ORAL ONCE AS NEEDED
Status: DISCONTINUED | OUTPATIENT
Start: 2023-10-23 | End: 2023-10-23 | Stop reason: HOSPADM

## 2023-10-23 RX ORDER — SODIUM CHLORIDE 0.9 % (FLUSH) 0.9 %
10 SYRINGE (ML) INJECTION EVERY 12 HOURS SCHEDULED
Status: DISCONTINUED | OUTPATIENT
Start: 2023-10-23 | End: 2023-10-23 | Stop reason: HOSPADM

## 2023-10-23 RX ORDER — SODIUM CHLORIDE 0.9 % (FLUSH) 0.9 %
10 SYRINGE (ML) INJECTION AS NEEDED
Status: DISCONTINUED | OUTPATIENT
Start: 2023-10-23 | End: 2023-10-23 | Stop reason: HOSPADM

## 2023-10-23 RX ORDER — FENTANYL CITRATE 50 UG/ML
INJECTION, SOLUTION INTRAMUSCULAR; INTRAVENOUS AS NEEDED
Status: DISCONTINUED | OUTPATIENT
Start: 2023-10-23 | End: 2023-10-23 | Stop reason: SURG

## 2023-10-23 RX ORDER — CEPHALEXIN 500 MG/1
500 CAPSULE ORAL 2 TIMES DAILY
Qty: 8 CAPSULE | Refills: 0 | Status: SHIPPED | OUTPATIENT
Start: 2023-10-23

## 2023-10-23 RX ORDER — HYDROCODONE BITARTRATE AND ACETAMINOPHEN 10; 325 MG/1; MG/1
1 TABLET ORAL EVERY 6 HOURS PRN
Qty: 16 TABLET | Refills: 0 | Status: SHIPPED | OUTPATIENT
Start: 2023-10-23

## 2023-10-23 RX ORDER — ONDANSETRON 2 MG/ML
INJECTION INTRAMUSCULAR; INTRAVENOUS AS NEEDED
Status: DISCONTINUED | OUTPATIENT
Start: 2023-10-23 | End: 2023-10-23 | Stop reason: SURG

## 2023-10-23 RX ORDER — BACITRACIN ZINC 500 [USP'U]/G
OINTMENT TOPICAL AS NEEDED
Status: DISCONTINUED | OUTPATIENT
Start: 2023-10-23 | End: 2023-10-23 | Stop reason: HOSPADM

## 2023-10-23 RX ORDER — PROPOFOL 10 MG/ML
INJECTION, EMULSION INTRAVENOUS AS NEEDED
Status: DISCONTINUED | OUTPATIENT
Start: 2023-10-23 | End: 2023-10-23 | Stop reason: SURG

## 2023-10-23 RX ORDER — MAGNESIUM HYDROXIDE 1200 MG/15ML
LIQUID ORAL AS NEEDED
Status: DISCONTINUED | OUTPATIENT
Start: 2023-10-23 | End: 2023-10-23 | Stop reason: HOSPADM

## 2023-10-23 RX ORDER — GENTAMICIN SULFATE 80 MG/100ML
80 INJECTION, SOLUTION INTRAVENOUS ONCE
Status: COMPLETED | OUTPATIENT
Start: 2023-10-23 | End: 2023-10-23

## 2023-10-23 RX ORDER — MIDAZOLAM HYDROCHLORIDE 1 MG/ML
INJECTION INTRAMUSCULAR; INTRAVENOUS AS NEEDED
Status: DISCONTINUED | OUTPATIENT
Start: 2023-10-23 | End: 2023-10-23 | Stop reason: SURG

## 2023-10-23 RX ORDER — LIDOCAINE HYDROCHLORIDE 20 MG/ML
JELLY TOPICAL AS NEEDED
Status: DISCONTINUED | OUTPATIENT
Start: 2023-10-23 | End: 2023-10-23 | Stop reason: HOSPADM

## 2023-10-23 RX ORDER — FAMOTIDINE 10 MG/ML
INJECTION, SOLUTION INTRAVENOUS AS NEEDED
Status: DISCONTINUED | OUTPATIENT
Start: 2023-10-23 | End: 2023-10-23 | Stop reason: SURG

## 2023-10-23 RX ORDER — SODIUM CHLORIDE 9 MG/ML
40 INJECTION, SOLUTION INTRAVENOUS AS NEEDED
Status: DISCONTINUED | OUTPATIENT
Start: 2023-10-23 | End: 2023-10-23 | Stop reason: HOSPADM

## 2023-10-23 RX ORDER — FENTANYL CITRATE 50 UG/ML
50 INJECTION, SOLUTION INTRAMUSCULAR; INTRAVENOUS
Status: DISCONTINUED | OUTPATIENT
Start: 2023-10-23 | End: 2023-10-23 | Stop reason: HOSPADM

## 2023-10-23 RX ORDER — SODIUM CHLORIDE, SODIUM LACTATE, POTASSIUM CHLORIDE, CALCIUM CHLORIDE 600; 310; 30; 20 MG/100ML; MG/100ML; MG/100ML; MG/100ML
100 INJECTION, SOLUTION INTRAVENOUS ONCE AS NEEDED
Status: DISCONTINUED | OUTPATIENT
Start: 2023-10-23 | End: 2023-10-23 | Stop reason: HOSPADM

## 2023-10-23 RX ORDER — MIDAZOLAM HYDROCHLORIDE 1 MG/ML
1 INJECTION INTRAMUSCULAR; INTRAVENOUS
Status: DISCONTINUED | OUTPATIENT
Start: 2023-10-23 | End: 2023-10-23 | Stop reason: HOSPADM

## 2023-10-23 RX ADMIN — FAMOTIDINE 20 MG: 10 INJECTION, SOLUTION INTRAVENOUS at 09:53

## 2023-10-23 RX ADMIN — ONDANSETRON 4 MG: 2 INJECTION INTRAMUSCULAR; INTRAVENOUS at 09:53

## 2023-10-23 RX ADMIN — FENTANYL CITRATE 50 MCG: 50 INJECTION, SOLUTION INTRAMUSCULAR; INTRAVENOUS at 10:05

## 2023-10-23 RX ADMIN — SODIUM CHLORIDE, POTASSIUM CHLORIDE, SODIUM LACTATE AND CALCIUM CHLORIDE: 600; 310; 30; 20 INJECTION, SOLUTION INTRAVENOUS at 10:28

## 2023-10-23 RX ADMIN — SODIUM CHLORIDE, POTASSIUM CHLORIDE, SODIUM LACTATE AND CALCIUM CHLORIDE: 600; 310; 30; 20 INJECTION, SOLUTION INTRAVENOUS at 08:50

## 2023-10-23 RX ADMIN — GENTAMICIN SULFATE 80 MG: 80 INJECTION, SOLUTION INTRAVENOUS at 09:53

## 2023-10-23 RX ADMIN — PROPOFOL 170 MG: 10 INJECTION, EMULSION INTRAVENOUS at 09:56

## 2023-10-23 RX ADMIN — MIDAZOLAM HYDROCHLORIDE 2 MG: 1 INJECTION, SOLUTION INTRAMUSCULAR; INTRAVENOUS at 09:53

## 2023-10-23 RX ADMIN — FENTANYL CITRATE 50 MCG: 50 INJECTION, SOLUTION INTRAMUSCULAR; INTRAVENOUS at 10:10

## 2023-10-23 NOTE — ANESTHESIA PROCEDURE NOTES
Airway  Urgency: elective    Date/Time: 10/23/2023 9:57 AM  End Time:10/23/2023 9:57 AM    General Information and Staff    Patient location during procedure: OR  CRNA/CAA: Kayden Fraire CRNA    Indications and Patient Condition  Indications for airway management: airway protection    Preoxygenated: yes  MILS maintained throughout  Mask difficulty assessment: 0 - not attempted    Final Airway Details  Final airway type: supraglottic airway      Successful airway: unique  Size 4  Airway Seal Pressure (cm H2O): 20     Number of attempts at approach: 1  Assessment: lips, teeth, and gum same as pre-op and atraumatic intubation    Additional Comments  Atraumatic

## 2023-10-23 NOTE — H&P
Chief Complaint   Patient presents with    Cystoscopy       Benign prostatic hyperplasia            HPI:   52 y.o. male presents for cystoscopy secondary to severe burning.  When he urinates his foreskin balloons.  He is here for cystoscopy but unable to place a scope this is clearly severe phimosis I will set him up for elective circumcision and concomitant cystoscopy to complete the work-up.     Past Medical History:      Medical History        Past Medical History:   Diagnosis Date    Headache      Low back pain      Sleep apnea              Current Meds:      Current Medications          Current Outpatient Medications   Medication Sig Dispense Refill    albuterol sulfate  (90 Base) MCG/ACT inhaler Inhale 2 puffs Every 4 (Four) Hours As Needed for Wheezing. 18 g 8    baclofen (LIORESAL) 10 MG tablet Take 1 tablet by mouth 2 (Two) Times a Day.        benzonatate (TESSALON) 100 MG capsule Take 1 capsule by mouth 3 (Three) Times a Day As Needed for Cough. 42 capsule 6    budesonide-formoterol (Symbicort) 160-4.5 MCG/ACT inhaler Inhale 2 puffs 2 (Two) Times a Day. 1 each 8    busPIRone (BUSPAR) 30 MG tablet Take 1 tablet by mouth Daily.        clonazePAM (KlonoPIN) 0.5 MG tablet Take 1 tablet by mouth 2 (Two) Times a Day As Needed.        diclofenac (VOLTAREN) 75 MG EC tablet Take 1 tablet by mouth 2 (Two) Times a Day As Needed.        DULoxetine (CYMBALTA) 60 MG capsule Take 1 capsule by mouth 2 (Two) Times a Day.        famotidine (Pepcid) 40 MG tablet Take 1 tablet by mouth At Night As Needed for Heartburn. 30 tablet 5    gabapentin (NEURONTIN) 600 MG tablet Take 1 tablet by mouth 3 (Three) Times a Day.        HYDROcodone-acetaminophen (NORCO)  MG per tablet Take 1 tablet by mouth 3 (Three) Times a Day.        hydrOXYzine (ATARAX) 50 MG tablet Take 1 tablet by mouth 2 (Two) Times a Day.        methocarbamol (ROBAXIN) 750 MG tablet Take 1 tablet by mouth 4 (Four) Times a Day As Needed for Muscle  Spasms.        montelukast (SINGULAIR) 10 MG tablet Take 1 tablet by mouth Every Night. 30 tablet 5    naloxone (NARCAN) 4 MG/0.1ML nasal spray 1 spray into the nostril(s) as directed by provider.        OLANZapine (zyPREXA) 7.5 MG tablet Take 1 tablet by mouth Every Night.        propranolol (INDERAL) 20 MG tablet Take 2 tablets by mouth 2 (Two) Times a Day.        QUEtiapine (SEROquel) 100 MG tablet Take 1 tablet by mouth Every Night.        rOPINIRole (REQUIP) 4 MG tablet Take 1 tablet by mouth Every Night.        terazosin (HYTRIN) 5 MG capsule Take 1 capsule by mouth Every Night. 30 capsule 3    Tezepelumab-ekko (Tezspire) 210 MG/1.91ML injection Inject 210 mg under the skin into the appropriate area as directed Every 28 (Twenty-Eight) Days. 1.91 mL 5    Tiotropium Bromide Monohydrate (Spiriva Respimat) 1.25 MCG/ACT aerosol solution inhaler Inhale 2 puffs Daily. 4 g 8      No current facility-administered medications for this visit.            Allergies:       No Known Allergies     Past Surgical History:      Surgical History         Past Surgical History:   Procedure Laterality Date    AL MYELOGRAPHY VIA LUMBAR INJECTION RS&I CERVICAL N/A 10/29/2019     Procedure: IR myelogram, cervical;  Surgeon: Jose Daniel Kirby MD;  Location: [facility] INVASIVE LOCATION;  Service: Interventional Radiology            Social History:      Social History   Social History           Socioeconomic History    Marital status:    Tobacco Use    Smoking status: Never    Smokeless tobacco: Never   Vaping Use    Vaping Use: Never used   Substance and Sexual Activity    Alcohol use: No    Drug use: No    Sexual activity: Defer            Family History:            Family History   Problem Relation Age of Onset    Heart disease Mother      Arthritis Mother      Diabetes Mother      Heart disease Father      Arthritis Father           Review of Systems:      Review of Systems   Constitutional: Negative.    HENT: Negative.      Eyes: Negative.    Respiratory: Negative.     Cardiovascular: Negative.    Gastrointestinal: Negative.    Endocrine: Negative.    Musculoskeletal: Negative.    Allergic/Immunologic: Negative.    Neurological: Negative.    Hematological: Negative.    Psychiatric/Behavioral: Negative.        Physical Exam:      Physical Exam  Vitals and nursing note reviewed.   Constitutional:       Appearance: He is well-developed.   HENT:      Head: Normocephalic and atraumatic.   Eyes:      Conjunctiva/sclera: Conjunctivae normal.      Pupils: Pupils are equal, round, and reactive to light.   Cardiovascular:      Rate and Rhythm: Normal rate and regular rhythm.      Heart sounds: Normal heart sounds.   Pulmonary:      Effort: Pulmonary effort is normal.      Breath sounds: Normal breath sounds.   Abdominal:      General: Bowel sounds are normal.      Palpations: Abdomen is soft.   Genitourinary:     Comments: Tight phimosis  Musculoskeletal:         General: Normal range of motion.      Cervical back: Normal range of motion.   Skin:     General: Skin is warm and dry.   Neurological:      Mental Status: He is alert and oriented to person, place, and time.      Deep Tendon Reflexes: Reflexes are normal and symmetric.   Psychiatric:         Behavior: Behavior normal.         Thought Content: Thought content normal.         Judgment: Judgment normal.         I have reviewed the following portions of the patient's history: Allergies, current medications, past family history, past medical history, past social history, past surgical history, problem list, and ROS and confirm it is accurate.     Recent Image (CT and/or KUB):      CT Abdomen and Pelvis: No results found for this or any previous visit.        CT Stone Protocol: No results found for this or any previous visit.        KUB: No results found for this or any previous visit.        Labs (past 3 months):              Office Visit on 08/09/2023   Component Date Value Ref Range  Status    Glucose 08/09/2023 108 (H)  65 - 99 mg/dL Final    BUN 08/09/2023 20  6 - 20 mg/dL Final    Creatinine 08/09/2023 1.17  0.76 - 1.27 mg/dL Final    Sodium 08/09/2023 139  136 - 145 mmol/L Final    Potassium 08/09/2023 4.6  3.5 - 5.2 mmol/L Final    Chloride 08/09/2023 102  98 - 107 mmol/L Final    CO2 08/09/2023 23.4  22.0 - 29.0 mmol/L Final    Calcium 08/09/2023 9.0  8.6 - 10.5 mg/dL Final    Total Protein 08/09/2023 6.5  6.0 - 8.5 g/dL Final    Albumin 08/09/2023 4.5  3.5 - 5.2 g/dL Final    ALT (SGPT) 08/09/2023 19  1 - 41 U/L Final    AST (SGOT) 08/09/2023 25  1 - 40 U/L Final    Alkaline Phosphatase 08/09/2023 88  39 - 117 U/L Final    Total Bilirubin 08/09/2023 0.4  0.0 - 1.2 mg/dL Final    Globulin 08/09/2023 2.0  gm/dL Final    A/G Ratio 08/09/2023 2.3  g/dL Final    BUN/Creatinine Ratio 08/09/2023 17.1  7.0 - 25.0 Final    Anion Gap 08/09/2023 13.6  5.0 - 15.0 mmol/L Final    eGFR 08/09/2023 74.1  >60.0 mL/min/1.73 Final    Ceruloplasmin 08/09/2023 20  16 - 31 mg/dL Final    Smooth Muscle Ab 08/09/2023 9  0 - 19 Units Final                      Negative                     0 - 19                   Weak positive               20 - 30                   Moderate to strong positive     >30   Actin Antibodies are found in 52-85% of patients with   autoimmune hepatitis or chronic active hepatitis and   in 22% of patients with primary biliary cirrhosis.    Liver Fraction: 08/09/2023 1.3  0.0 - 20.0 Units Final                                     Negative    0.0 - 20.0                                  Equivocal  20.1 - 24.9                                  Positive         >24.9  LKM type 1 antibodies are detected in patients with  autoimmune hepatitis type 2 and in up to 8% of  patients with chronic HCV infection.    JULES Direct 08/09/2023 Negative  Negative Final    Fibrosis Score 08/09/2023 0.28 (H)  0.00 - 0.21 Final    Fibrosis Stage 08/09/2023 Comment    Final                      F1 - Portal  fibrosis    Steatosis Score (Reference) 08/09/2023 0.39  0.00 - 0.40 Final    Steatosis Grade (Reference) 08/09/2023 Comment    Final                        S0 - No Steatosis (<5%)    SU Score (Reference) 08/09/2023 0.00  0.00 - 0.25 Final    Su Grade (Reference) 08/09/2023 Comment    Final                        N0 - No SU    Methodology: 08/09/2023 Comment    Final     The analytes tested are performed by FibroSure-Specific methods.  Not intended for use with other diagnostic considerations.    Alpha 2-Macroglobulins, Qn 08/09/2023 198  110 - 276 mg/dL Final    Haptoglobin 08/09/2023 70  29 - 370 mg/dL Final    Apolipoprotein A-1 08/09/2023 137  101 - 178 mg/dL Final    Total Bilirubin 08/09/2023 0.4  0.0 - 1.2 mg/dL Final    GGT 08/09/2023 24  0 - 65 IU/L Final    ALT (SGPT) 08/09/2023 24  0 - 55 IU/L Final    AST (SGOT) P5P (Reference) 08/09/2023 26  0 - 40 IU/L Final    Cholesterol, Total (Reference) 08/09/2023 161  100 - 199 mg/dL Final    Glucose, Serum (Reference) 08/09/2023 112 (H)  70 - 99 mg/dL Final    Triglycerides 08/09/2023 133  0 - 149 mg/dL Final    Interpretations: (Reference) 08/09/2023 Comment    Final     Comment: Quantitative results of 10 biochemicals in combination with age and  gender, are analyzed using a computational algorithm to provide a  quantitative surrogate marker (0.0-1.0) of liver fibrosis (Metavir  F0-F4), hepatic steatosis (0.0-1.0, S0-S3), and Non-Alcoholic  Steato-Hepatitis (SU) (0.0-1.0, N0-N3). The absence of steatosis  (S<0.40) precludes the diagnosis of SU.  Fibrosis marker: In a study of 171 Non-Alcoholic Fatty Liver Disease  (NAFLD) patients where 23% had significant NAFLD fibrosis (Metavir  F2-F4) and 11% had cirrhosis by liver biopsy, a fibrosis result of  >0.3 yielded a sensitivity of 83% and a specificity of 78% for the  detection of significant fibrosis.[1]  Steatosis marker: In a population of 2997 patients, where 61% had  significant steatosis (>=5%) on  a liver biopsy, a steatosis score  >0.4 had a sensitivity of 79% and a specificity of 50% for  identification of significant steatosis.[2]  SU marker: In a population of 1081 NAFLD patients, where 51% had  at                             least some SU by liver biopsy, a prediction of SU had a  sensitivity of 72% for identifying SU and a specificity of 71%.[3]    Fibrosis Scorin2023 Comment    Final          <=0.21 = Stage F0 - No fibrosis  0.21 - 0.27 = Stage F0 - F1  0.27 - 0.31 = Stage F1 - Portal fibrosis  0.31 - 0.48 = Stage F1 - F2  0.48 - 0.58 = Stage F2 - Bridging fibrosis with few septa  0.58 - 0.72 = Stage F3 - Bridging fibrosis with many septa  0.72 - 0.74 = Stage F3 - F4        >0.74 = Stage F4 - Cirrhosis    Steatosis Scoring 2023 Comment    Final          <=0.40 = S0  - No Steatosis (<5%)  0.40 - 0.55 = S1  - Mild Steatosis                      (but Clinically Significant) (5-33%)        >0.55 = S2S3- Moderate to Severe Steatosis                      (Clinically Significant) (%)    SU Scoring 2023 Comment    Final          <=0.25 = N0 - No SU  0.25 - 0.50 = N1 - Mild SU  0.50 - 0.75 = N2 - Moderate SU        >0.75 = N3 - Severe SU    Limitations: 2023 Comment    Final     SU FibroSure(R) Plus is recommended for patients with suspected  non-alcoholic fatty liver disease. It is not recommended for  patients with other liver diseases. It is also not recommended  in patients with Gilbert Disease, acute hemolysis, acute viral  hepatitis, drug induced hepatitis, genetic liver disease,  autoimmune hepatitis and/or extra-hepatic cholestasis. Any of  these clinical situations may lead to inaccurate quantitative  predictions of fibrosis.    Comment 2023 Comment    Final     This test was developed and its performance characteristics  determined by [employer]. It has not been cleared or approved  by the Food and Drug Administration.  For questions regarding  this report please contact customer service  at [phone].  References:  1.  Holland MUNOZ et al. Diagnostic Value of Biochemical Markers  (FibroTest) for the prediction of Liver Fibrosis in patients with  Non-Alcoholic Fatty Liver Disease. BMC Gastroenterology 2006; 6:6.  2.  Juma PHAN. et al. The Diagnostic Performance of a Simplified  Blood Test (SteatoTest-2) for the Prediction of Liver Steatosis.  Eur J Gastroenterol Hepatol. 2019; 31:393-402.  3.  Juma PHAN. et al. Diagnostic performance of a new noninvasive  test for nonalcoholic steatohepatitis using a simplified histological  reference. Eur J Gastroenterol Hepatol. 2018 May; 30:569-577.    Mitochondrial Ab 08/09/2023 <20.0  0.0 - 20.0 Units Final                                     Negative    0.0 - 20.0                                  Equivocal  20.1 - 24.9                                  Positive         >24.9  Mitochondrial (M2) Antibodies are found in 90-96% of  patients with primary biliary cirrhosis.    Iron 08/09/2023 95  59 - 158 mcg/dL Final    Iron Saturation (TSAT) 08/09/2023 23  20 - 50 % Final    Transferrin 08/09/2023 278  200 - 360 mg/dL Final    TIBC 08/09/2023 414  298 - 536 mcg/dL Final    Hepatitis B Surface Ag 08/09/2023 Non-Reactive  Non-Reactive Final    Hep A IgM 08/09/2023 Non-Reactive  Non-Reactive Final    Hep B C IgM 08/09/2023 Non-Reactive  Non-Reactive Final    Hepatitis C Ab 08/09/2023 Non-Reactive  Non-Reactive Final    Ferritin 08/09/2023 54.80  30.00 - 400.00 ng/mL Final    ALPHA -1 ANTITRYPSIN 08/09/2023 136  90 - 200 mg/dL Final    WBC 08/09/2023 3.77  3.40 - 10.80 10*3/mm3 Final    RBC 08/09/2023 5.11  4.14 - 5.80 10*6/mm3 Final    Hemoglobin 08/09/2023 15.3  13.0 - 17.7 g/dL Final    Hematocrit 08/09/2023 45.2  37.5 - 51.0 % Final    MCV 08/09/2023 88.5  79.0 - 97.0 fL Final    MCH 08/09/2023 29.9  26.6 - 33.0 pg Final    MCHC 08/09/2023 33.8  31.5 - 35.7 g/dL Final    RDW 08/09/2023 14.0  12.3 - 15.4 % Final     RDW-SD 08/09/2023 44.6  37.0 - 54.0 fl Final    MPV 08/09/2023 10.3  6.0 - 12.0 fL Final    Platelets 08/09/2023 235  140 - 450 10*3/mm3 Final    Neutrophil % 08/09/2023 46.6  42.7 - 76.0 % Final    Lymphocyte % 08/09/2023 38.5  19.6 - 45.3 % Final    Monocyte % 08/09/2023 8.0  5.0 - 12.0 % Final    Eosinophil % 08/09/2023 5.0  0.3 - 6.2 % Final    Basophil % 08/09/2023 1.6 (H)  0.0 - 1.5 % Final    Immature Grans % 08/09/2023 0.3  0.0 - 0.5 % Final    Neutrophils, Absolute 08/09/2023 1.76  1.70 - 7.00 10*3/mm3 Final    Lymphocytes, Absolute 08/09/2023 1.45  0.70 - 3.10 10*3/mm3 Final    Monocytes, Absolute 08/09/2023 0.30  0.10 - 0.90 10*3/mm3 Final    Eosinophils, Absolute 08/09/2023 0.19  0.00 - 0.40 10*3/mm3 Final    Basophils, Absolute 08/09/2023 0.06  0.00 - 0.20 10*3/mm3 Final    Immature Grans, Absolute 08/09/2023 0.01  0.00 - 0.05 10*3/mm3 Final    nRBC 08/09/2023 0.0  0.0 - 0.2 /100 WBC Final         Procedure:         Assessment/Plan:   Phimosis-tight unable to do cystoscopy we will set him up for circumcision and cystoscopy in the near future.

## 2023-10-23 NOTE — OP NOTE
Reji Linares  10/23/2023    Pre-op Diagnosis:   Phimosis [N47.1]  Benign prostatic hyperplasia with urinary hesitancy [N40.1, R39.11]    Post-op Diagnosis:     Post-Op Diagnosis Codes:     * Phimosis [N47.1]     * Benign prostatic hyperplasia with urinary hesitancy [N40.1, R39.11]    Procedure/CPT® Codes:  52-year-old white male with severe phimosis who when he urinates the foreskin balloons and is desirous of a circumcision because a significant voiding dysfunction he was due to have a cystoscopy concomitantly with that.  Following an informed consent brought the procedure suite prepped and draped in a sterile fashion I anesthetized the outer aspect of the preps.  Extremely tight foreskin I then incised it with a dorsal slit took down the skin circumferentially with extensive left glandular adhesions requiring sharp dissection the only area that dissected nicely was that the dorsum I reconstructed circumferentially there will be some areas that will granulate in but overall looks good.  I reanastomosed the preps circumferentially with 4-0 chromic sutures the meatus was extremely tight and I gently dilated to 24 Setswana and used the flexible scope showing a normal proximal urethra normal bladder no stones tumors foreign bodies and no significant prostate enlargement.  Tolerated well he will be seen back in the office in 1 week    Procedure(s):  CIRCUMCISION AND CYSTOSCOPY  URETHRAL DILATATION    Surgeon(s):  Jhon Jimenez MD    Anesthesia: see anesthesia record    Staff:   Circulator: Joana Bonner RN  Scrub Person: Gisela Teixeira LPN; Amanda Suh    Estimated Blood Loss: none  Urine Voided: * No values recorded between 10/23/2023  9:52 AM and 10/23/2023 10:28 AM *    Specimens:                ID Type Source Tests Collected by Time   A :  Tissue Foreskin TISSUE EXAM, [facility] ([facility], [facility]) Jhon Jimenez MD 10/23/2023 1003         Drains: None    Findings: Tight  foreskin with extensive glandular adhesions requiring sharp dissection and meatal stenosis cystoscopy was otherwise unremarkable for prostatic enlargement    Blood: N/A    Complications: None    Grafts and Implants: None    Jhon Jimenez MD     Date: 10/23/2023  Time: 10:29 EDT

## 2023-10-23 NOTE — ANESTHESIA POSTPROCEDURE EVALUATION
Patient: Reji Linares    Procedure Summary       Date: 10/23/23 Room / Location: [facility] OR  /  [facility] OR    Anesthesia Start: 0953 Anesthesia Stop: 1028    Procedures:       CIRCUMCISION AND CYSTOSCOPY (Penis)      URETHRAL DILATATION (Urethra) Diagnosis:       Phimosis      Benign prostatic hyperplasia with urinary hesitancy      (Phimosis [N47.1])      (Benign prostatic hyperplasia with urinary hesitancy [N40.1, R39.11])    Surgeons: Jhon Jimenez MD Provider: Tod Mobley MD    Anesthesia Type: general ASA Status: 2            Anesthesia Type: general    Vitals  Vitals Value Taken Time   /84 10/23/23 1040   Temp 97.1 °F (36.2 °C) 10/23/23 1029   Pulse 67 10/23/23 1043   Resp 11 10/23/23 1029   SpO2 99 % 10/23/23 1043   Vitals shown include unfiled device data.        Post Anesthesia Care and Evaluation    Patient location during evaluation: bedside  Patient participation: complete - patient participated  Level of consciousness: awake and alert  Pain score: 1  Pain management: adequate    Airway patency: patent  Anesthetic complications: No anesthetic complications  PONV Status: none  Cardiovascular status: acceptable  Respiratory status: acceptable  Hydration status: acceptable

## 2023-10-25 ENCOUNTER — TELEPHONE (OUTPATIENT)
Dept: GASTROENTEROLOGY | Facility: CLINIC | Age: 53
End: 2023-10-25

## 2023-10-25 LAB — REF LAB TEST METHOD: NORMAL

## 2023-10-25 NOTE — TELEPHONE ENCOUNTER
Caller: Reji Linares    Relationship to patient: Self    Best call back number: [phone]     Type of visit: PROCEDURE    Requested date: PATIENT IS WANTING TO CANCEL 11/2/23 SCOPE.     Additional notes: PATIENT STATES THAT HE WILL CALL BACK TO RESCHEDULE. PLEASE CALL BACK TO CONFIRM CANCELLATION.

## 2023-10-30 ENCOUNTER — OFFICE VISIT (OUTPATIENT)
Dept: UROLOGY | Facility: CLINIC | Age: 53
End: 2023-10-30
Payer: MEDICARE

## 2023-10-30 VITALS
WEIGHT: 191.4 LBS | BODY MASS INDEX: 27.4 KG/M2 | HEART RATE: 76 BPM | SYSTOLIC BLOOD PRESSURE: 142 MMHG | HEIGHT: 70 IN | DIASTOLIC BLOOD PRESSURE: 91 MMHG

## 2023-10-30 DIAGNOSIS — N35.819 OTHER STRICTURE OF URETHRA IN MALE: ICD-10-CM

## 2023-10-30 DIAGNOSIS — Z48.816 AFTERCARE FOR CIRCUMCISION: Primary | ICD-10-CM

## 2023-10-30 DIAGNOSIS — N48.0 BALANITIS XEROTICA OBLITERANS: ICD-10-CM

## 2023-10-30 PROBLEM — N35.919 URETHRAL STRICTURE: Status: ACTIVE | Noted: 2023-10-30

## 2023-10-30 PROCEDURE — 1159F MED LIST DOCD IN RCRD: CPT

## 2023-10-30 PROCEDURE — 99024 POSTOP FOLLOW-UP VISIT: CPT

## 2023-10-30 PROCEDURE — 1160F RVW MEDS BY RX/DR IN RCRD: CPT

## 2023-10-30 RX ORDER — SULFAMETHOXAZOLE AND TRIMETHOPRIM 800; 160 MG/1; MG/1
1 TABLET ORAL EVERY 12 HOURS
Qty: 10 TABLET | Refills: 0 | Status: SHIPPED | OUTPATIENT
Start: 2023-10-30

## 2023-10-30 RX ORDER — CLOTRIMAZOLE AND BETAMETHASONE DIPROPIONATE 10; .64 MG/G; MG/G
1 CREAM TOPICAL 2 TIMES DAILY
Qty: 45 G | Refills: 2 | Status: SHIPPED | OUTPATIENT
Start: 2023-10-30

## 2023-10-30 NOTE — PROGRESS NOTES
"Chief Complaint:    Chief Complaint   Patient presents with    Circumcision     1 week follow up       Vital Signs:   /91   Pulse 76   Ht 177.8 cm (70\")   Wt 86.8 kg (191 lb 6.4 oz)   BMI 27.46 kg/m²   Body mass index is 27.46 kg/m².      HPI:  Reji Linares is a 52 y.o. male who presents today for follow up    History of Present Illness  Mr. Linares presents to the clinic for postop follow-up.  Patient underwent elective circumcision by Dr. Jimenez roughly 1 week ago.  He was sent home with cephalexin twice daily for 4 days.  Patient states that since surgery he still had some inflammation and swelling.  He does endorse a improvement in urinary stream.  He also had a cystoscopy completed at the same time as circumcision.  Dr. Jimenez noted that he had a significant narrowing of the urethral meatus and dilated him to 24 French.  After he was able to pass the cystoscope he identified a normal urethra, prostate, and bladder.  No abnormalities were noted.  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  Physical exam today patient does have significant inflammation of the glans of the penis with scant yellow discharge.      Past Medical History:  Past Medical History:   Diagnosis Date    Anxiety     Arthritis     Asthma     GERD (gastroesophageal reflux disease)     Headache     Low back pain     Sleep apnea     no c-pap       Current Meds:  Current Outpatient Medications   Medication Sig Dispense Refill    albuterol sulfate  (90 Base) MCG/ACT inhaler Inhale 2 puffs Every 4 (Four) Hours As Needed for Wheezing. 18 g 8    benzonatate (TESSALON) 100 MG capsule Take 1 capsule by mouth 3 (Three) Times a Day As Needed for Cough. 42 capsule 6    budesonide-formoterol (Symbicort) 160-4.5 MCG/ACT inhaler Inhale 2 puffs 2 (Two) Times a Day. 1 each 8    busPIRone (BUSPAR) 30 MG tablet Take 1 tablet by mouth Daily.      cephalexin (Keflex) 500 MG capsule Take 1 " capsule by mouth 2 (Two) Times a Day. 8 capsule 0    clonazePAM (KlonoPIN) 0.5 MG tablet Take 1 tablet by mouth 2 (Two) Times a Day As Needed.      diclofenac (VOLTAREN) 75 MG EC tablet Take 1 tablet by mouth 2 (Two) Times a Day As Needed.      DULoxetine (CYMBALTA) 60 MG capsule Take 1 capsule by mouth 2 (Two) Times a Day.      famotidine (Pepcid) 40 MG tablet Take 1 tablet by mouth At Night As Needed for Heartburn. 30 tablet 5    gabapentin (NEURONTIN) 600 MG tablet Take 1 tablet by mouth 3 (Three) Times a Day.      HYDROcodone-acetaminophen (NORCO)  MG per tablet Take 1 tablet by mouth 3 (Three) Times a Day.      HYDROcodone-acetaminophen (NORCO)  MG per tablet Take 1 tablet by mouth Every 6 (Six) Hours As Needed for Moderate (Pain). 16 tablet 0    hydrOXYzine (ATARAX) 50 MG tablet Take 1 tablet by mouth 2 (Two) Times a Day.      lansoprazole (PREVACID) 30 MG capsule Take 1 capsule by mouth 2 (Two) Times a Day. Take one capsule 30 minutes prior to eating breakfast 60 capsule 5    methocarbamol (ROBAXIN) 750 MG tablet Take 1 tablet by mouth 4 (Four) Times a Day As Needed for Muscle Spasms.      montelukast (SINGULAIR) 10 MG tablet Take 1 tablet by mouth Every Night. 30 tablet 5    OLANZapine (zyPREXA) 7.5 MG tablet Take 1 tablet by mouth Every Night.      propranolol (INDERAL) 20 MG tablet Take 2 tablets by mouth 2 (Two) Times a Day.      QUEtiapine (SEROquel) 100 MG tablet Take 1 tablet by mouth Every Night.      rOPINIRole (REQUIP) 4 MG tablet Take 1 tablet by mouth Every Night.      terazosin (HYTRIN) 5 MG capsule Take 1 capsule by mouth Every Night. 30 capsule 3    Tezepelumab-ekko (Tezspire) 210 MG/1.91ML injection Inject 210 mg under the skin into the appropriate area as directed Every 28 (Twenty-Eight) Days. 1.91 mL 5    Tiotropium Bromide Monohydrate (Spiriva Respimat) 1.25 MCG/ACT aerosol solution inhaler Inhale 2 puffs Daily. 4 g 8    clotrimazole-betamethasone (Lotrisone) 1-0.05 % cream  Apply 1 application  topically to the appropriate area as directed 2 (Two) Times a Day. 45 g 2    sulfamethoxazole-trimethoprim (Bactrim DS) 800-160 MG per tablet Take 1 tablet by mouth Every 12 (Twelve) Hours. 10 tablet 0     No current facility-administered medications for this visit.        Allergies:   No Known Allergies     Past Surgical History:  Past Surgical History:   Procedure Laterality Date    CIRCUMCISION N/A 10/23/2023    Procedure: CIRCUMCISION AND CYSTOSCOPY;  Surgeon: Jhon Jimenez MD;  Location: [facility] OR;  Service: Urology;  Laterality: N/A;    COLONOSCOPY      ENDOSCOPY      with dilatation    ME MYELOGRAPHY VIA LUMBAR INJECTION RS&I CERVICAL N/A 10/29/2019    Procedure: IR myelogram, cervical;  Surgeon: Jose Daniel Kirby MD;  Location: [facility] CATH INVASIVE LOCATION;  Service: Interventional Radiology    URETHRAL DILATATION N/A 10/23/2023    Procedure: URETHRAL DILATATION;  Surgeon: Jhon Jimenez MD;  Location: [facility] OR;  Service: Urology;  Laterality: N/A;       Social History:  Social History     Socioeconomic History    Marital status:    Tobacco Use    Smoking status: Never     Passive exposure: Never    Smokeless tobacco: Never   Vaping Use    Vaping Use: Never used   Substance and Sexual Activity    Alcohol use: No    Drug use: No    Sexual activity: Defer       Family History:  Family History   Problem Relation Age of Onset    Heart disease Mother     Arthritis Mother     Diabetes Mother     Heart disease Father     Arthritis Father        Review of Systems:  Review of Systems   Constitutional:  Negative for chills, fatigue, fever and unexpected weight change.   Respiratory:  Negative for cough, chest tightness, shortness of breath and wheezing.    Cardiovascular:  Negative for chest pain and leg swelling.   Gastrointestinal:  Negative for abdominal pain, constipation, diarrhea, nausea and vomiting.   Genitourinary:  Positive for penile discharge, penile pain and  penile swelling. Negative for difficulty urinating, dysuria, flank pain, frequency, hematuria and urgency.   Musculoskeletal:  Negative for back pain and joint swelling.   Skin:  Negative for rash.   Neurological:  Negative for dizziness and headaches.   Psychiatric/Behavioral:  Negative for confusion and suicidal ideas.        Physical Exam:  Physical Exam  Constitutional:       General: He is not in acute distress.     Appearance: Normal appearance.   HENT:      Head: Normocephalic and atraumatic.      Nose: Nose normal.      Mouth/Throat:      Mouth: Mucous membranes are moist.   Eyes:      Conjunctiva/sclera: Conjunctivae normal.   Cardiovascular:      Rate and Rhythm: Normal rate and regular rhythm.      Pulses: Normal pulses.      Heart sounds: Normal heart sounds.   Pulmonary:      Effort: Pulmonary effort is normal.      Breath sounds: Normal breath sounds.   Abdominal:      General: Bowel sounds are normal.      Palpations: Abdomen is soft.   Genitourinary:     Comments: Inflammation of the glans of the penis with scant yellow discharge.  Sutures still present however some dissolving.  Musculoskeletal:         General: Normal range of motion.      Cervical back: Normal range of motion.   Skin:     General: Skin is warm.   Neurological:      General: No focal deficit present.      Mental Status: He is alert and oriented to person, place, and time.   Psychiatric:         Mood and Affect: Mood normal.         Behavior: Behavior normal.         Thought Content: Thought content normal.         Judgment: Judgment normal.           Recent Image (CT and/or KUB):   CT Abdomen and Pelvis: No results found for this or any previous visit.     CT Stone Protocol: No results found for this or any previous visit.     KUB: No results found for this or any previous visit.       Labs:  Brief Urine Lab Results  (Last result in the past 365 days)        Color   Clarity   Blood   Leuk Est   Nitrite   Protein   CREAT   Urine HCG   "      23 0954 Yellow   Clear   Negative   Negative   Negative   Negative                 Admission on 10/23/2023, Discharged on 10/23/2023   Component Date Value Ref Range Status    Reference Lab Report 10/23/2023    Final                    Value:[facility]  [address] [place]  [address]  Phone: [phone] or [phone]  Fax: [phone]  Danial Savage M.D., Medical Director    PATIENT NAME                           LABORATORY NO.  TREVER VILLATORO.                   [id number]  [id number]                         AGE              SEX  SSN           CLIENT REF #  [facility] ALEXUS              52      1970  M    [id number]   [id number]    [address] [place] [address]                     REQUESTING M.D.     ATTENDING [place] [id number]                   HATTIE JUDD  DATE COLLECTED      DATE RECEIVED      DATE REPORTED  10/23/2023          10/23/2023         10/25/2023    DIAGNOSIS:  FORESKIN:  Chronic balanitis with fibrosis (see comment)    CAM    COMMENT:  The foreskin is focally involved by lichenoid chronic  inflammation with underlying fibrosis.  The changes are suggestive of balanitis  xerotica obliterans.    CLINICAL HISTORY:  Phimosis, benign                           prostatic hyperplasia with urinary hesitancy    SPECIMENS RECEIVED:  FORESKIN    MICROSCOPIC DESCRIPTION:  Tissue blocks are prepared and slides are examined microscopically. See  diagnosis for details.    Professional interpretation rendered by Anita Gil M.D., F.C.A.P. at  hint, [employer], [address] [place] [address].    GROSS DESCRIPTION:  Specimen received in formalin labeled \"foreskin\" and consists of a 3.4 x 3.3 x  1.2 cm unoriented, irregularly shaped portion of tan wrinkled foreskin.  No  lesions or ulcerated areas are grossly identified.  Surgical margin is inked blue.  Sectioning reveals a tan partially edematous cut surface.  No areas of " nodularity  or other discrete mass lesions are grossly identified.  Representative sections are  submitted in a single cassette.  JTM/RLL    REVIEWED, DIAGNOSED AND ELECTRONICALLY  SIGNED BY:    Anita Gil M.D., F.C.A.P.  CPT CODES:  96888     Pre-Admission Testing on 10/20/2023   Component Date Value Ref Range Status    QT Interval 10/20/2023 374  ms Final    QTC Interval 10/20/2023 403  ms Final   Hospital Outpatient Visit on 2023   Component Date Value Ref Range Status    Protime 2023 13.1  12.1 - 14.7 Seconds Final    INR 2023 0.95  0.90 - 1.10 Final    Platelets 2023 212  140 - 450 10*3/mm3 Final    Reference Lab Report 2023    Final                    Value:[facility]  [address] [place] [address]  Phone: [phone] or [phone]  Fax: [phone]  Danial Savage M.D., Medical Director    PATIENT NAME                           LABORATORY NO.  TREVER VILLATORO.                   [id number]  [id number]                         AGE              SEX  SSN           CLIENT REF #  [facility]              52      1970  M    [id number]   [id number]    [address]                     REQUESTING M.D.     ATTENDING M.D.     [address]  DATE COLLECTED      DATE RECEIVED      DATE REPORTED  2023    DIAGNOSIS:  LIVER, NEEDLE CORE BIOPSIES:  Scant fragments of fibrovascular connective tissue  Negative for evidence of liver parenchyma.  See comment    COMMENT:  Biopsies show only scant fragments of fibrovascular connective  tissue.  No true fragments with liver parenchyma are present.                           Multiple deeper  levels were also examined. This may represent liver capsule.  Follow-up is  recommended as clinically indicated.    CLINICAL HISTORY:  Fatty liver    SPECIMENS RECEIVED:  LIVER, NEEDLE CORE  "BIOPSIES    MICROSCOPIC DESCRIPTION:  Tissue blocks are prepared and slides are examined microscopically on all  specimens. See diagnosis for details.    Professional interpretation rendered by Eduar Mccoy M.D., AMADO at [employer], [address] [place] [address].    GROSS DESCRIPTION:  Labeled as \"liver\", consisting of multiple pieces of fragmented tan needle core.  Specimen is dyed with eosin, filtered, measuring 0.4 x 0.3 x 0.1 cm in aggregate,  submitted entirely in 1 cassette.  SOG    REVIEWED, DIAGNOSED AND ELECTRONICALLY  SIGNED BY:    Eduar Mccoy M.D., BARBER.  CPT CODES:  [id number]     Admission on 09/27/2023, Discharged on 09/27/2023   Component Date Value Ref Range Status    Glucose 09/27/2023 154 (H)  65 - 99 mg/dL Final    BUN 09/27/2023 22 (H)  6 - 20 mg/dL Final    Creatinine 09/27/2023 1.37 (H)  0.76 - 1.27 mg/dL Final    Sodium 09/27/2023 135 (L)  136 - 145 mmol/L Final    Potassium 09/27/2023 4.4  3.5 - 5.2 mmol/L Final    Slight hemolysis detected by analyzer. Results may be affected.    Chloride 09/27/2023 102  98 - 107 mmol/L Final    CO2 09/27/2023 22.1  22.0 - 29.0 mmol/L Final    Calcium 09/27/2023 9.5  8.6 - 10.5 mg/dL Final    Total Protein 09/27/2023 7.2  6.0 - 8.5 g/dL Final    Albumin 09/27/2023 4.5  3.5 - 5.2 g/dL Final    ALT (SGPT) 09/27/2023 22  1 - 41 U/L Final    AST (SGOT) 09/27/2023 21  1 - 40 U/L Final    Alkaline Phosphatase 09/27/2023 99  39 - 117 U/L Final    Total Bilirubin 09/27/2023 0.8  0.0 - 1.2 mg/dL Final    Globulin 09/27/2023 2.7  gm/dL Final    A/G Ratio 09/27/2023 1.7  g/dL Final    BUN/Creatinine Ratio 09/27/2023 16.1  7.0 - 25.0 Final    Anion Gap 09/27/2023 10.9  5.0 - 15.0 mmol/L Final    eGFR 09/27/2023 61.3  >60.0 mL/min/1.73 Final    PTT 09/27/2023 25.2 (L)  26.5 - 34.5 seconds Final    Protime 09/27/2023 13.1  12.1 - 14.7 Seconds Final    INR 09/27/2023 0.95  0.90 - 1.10 Final    WBC 09/27/2023 6.59  3.40 - 10.80 10*3/mm3 Final    RBC " 09/27/2023 5.35  4.14 - 5.80 10*6/mm3 Final    Hemoglobin 09/27/2023 15.9  13.0 - 17.7 g/dL Final    Hematocrit 09/27/2023 47.2  37.5 - 51.0 % Final    MCV 09/27/2023 88.2  79.0 - 97.0 fL Final    MCH 09/27/2023 29.7  26.6 - 33.0 pg Final    MCHC 09/27/2023 33.7  31.5 - 35.7 g/dL Final    RDW 09/27/2023 13.0  12.3 - 15.4 % Final    RDW-SD 09/27/2023 41.8  37.0 - 54.0 fl Final    MPV 09/27/2023 9.5  6.0 - 12.0 fL Final    Platelets 09/27/2023 232  140 - 450 10*3/mm3 Final    Neutrophil % 09/27/2023 47.6  42.7 - 76.0 % Final    Lymphocyte % 09/27/2023 38.4  19.6 - 45.3 % Final    Monocyte % 09/27/2023 9.4  5.0 - 12.0 % Final    Eosinophil % 09/27/2023 3.2  0.3 - 6.2 % Final    Basophil % 09/27/2023 1.2  0.0 - 1.5 % Final    Immature Grans % 09/27/2023 0.2  0.0 - 0.5 % Final    Neutrophils, Absolute 09/27/2023 3.14  1.70 - 7.00 10*3/mm3 Final    Lymphocytes, Absolute 09/27/2023 2.53  0.70 - 3.10 10*3/mm3 Final    Monocytes, Absolute 09/27/2023 0.62  0.10 - 0.90 10*3/mm3 Final    Eosinophils, Absolute 09/27/2023 0.21  0.00 - 0.40 10*3/mm3 Final    Basophils, Absolute 09/27/2023 0.08  0.00 - 0.20 10*3/mm3 Final    Immature Grans, Absolute 09/27/2023 0.01  0.00 - 0.05 10*3/mm3 Final    nRBC 09/27/2023 0.0  0.0 - 0.2 /100 WBC Final    Extra Tube 09/27/2023 Hold for add-ons.   Final    Auto resulted.    Extra Tube 09/27/2023 hold for add-on   Final    Auto resulted    Extra Tube 09/27/2023 Hold for add-ons.   Final    Auto resulted.    Extra Tube 09/27/2023 Hold for add-ons.   Final    Auto resulted   Office Visit on 08/09/2023   Component Date Value Ref Range Status    Glucose 08/09/2023 108 (H)  65 - 99 mg/dL Final    BUN 08/09/2023 20  6 - 20 mg/dL Final    Creatinine 08/09/2023 1.17  0.76 - 1.27 mg/dL Final    Sodium 08/09/2023 139  136 - 145 mmol/L Final    Potassium 08/09/2023 4.6  3.5 - 5.2 mmol/L Final    Chloride 08/09/2023 102  98 - 107 mmol/L Final    CO2 08/09/2023 23.4  22.0 - 29.0 mmol/L Final    Calcium  08/09/2023 9.0  8.6 - 10.5 mg/dL Final    Total Protein 08/09/2023 6.5  6.0 - 8.5 g/dL Final    Albumin 08/09/2023 4.5  3.5 - 5.2 g/dL Final    ALT (SGPT) 08/09/2023 19  1 - 41 U/L Final    AST (SGOT) 08/09/2023 25  1 - 40 U/L Final    Alkaline Phosphatase 08/09/2023 88  39 - 117 U/L Final    Total Bilirubin 08/09/2023 0.4  0.0 - 1.2 mg/dL Final    Globulin 08/09/2023 2.0  gm/dL Final    A/G Ratio 08/09/2023 2.3  g/dL Final    BUN/Creatinine Ratio 08/09/2023 17.1  7.0 - 25.0 Final    Anion Gap 08/09/2023 13.6  5.0 - 15.0 mmol/L Final    eGFR 08/09/2023 74.1  >60.0 mL/min/1.73 Final    Ceruloplasmin 08/09/2023 20  16 - 31 mg/dL Final    Smooth Muscle Ab 08/09/2023 9  0 - 19 Units Final                     Negative                     0 - 19                   Weak positive               20 - 30                   Moderate to strong positive     >30   Actin Antibodies are found in 52-85% of patients with   autoimmune hepatitis or chronic active hepatitis and   in 22% of patients with primary biliary cirrhosis.    Liver Fraction: 08/09/2023 1.3  0.0 - 20.0 Units Final                                    Negative    0.0 - 20.0                                  Equivocal  20.1 - 24.9                                  Positive         >24.9  LKM type 1 antibodies are detected in patients with  autoimmune hepatitis type 2 and in up to 8% of  patients with chronic HCV infection.    JULES Direct 08/09/2023 Negative  Negative Final    Fibrosis Score 08/09/2023 0.28 (H)  0.00 - 0.21 Final    Fibrosis Stage 08/09/2023 Comment   Final                     F1 - Portal fibrosis    Steatosis Score (Reference) 08/09/2023 0.39  0.00 - 0.40 Final    Steatosis Grade (Reference) 08/09/2023 Comment   Final                       S0 - No Steatosis (<5%)    SU Score (Reference) 08/09/2023 0.00  0.00 - 0.25 Final    Su Grade (Reference) 08/09/2023 Comment   Final                       N0 - No SU    Methodology: 08/09/2023 Comment   Final     The analytes tested are performed by FibroSure-Specific methods.  Not intended for use with other diagnostic considerations.    Alpha 2-Macroglobulins, Qn 08/09/2023 198  110 - 276 mg/dL Final    Haptoglobin 08/09/2023 70  29 - 370 mg/dL Final    Apolipoprotein A-1 08/09/2023 137  101 - 178 mg/dL Final    Total Bilirubin 08/09/2023 0.4  0.0 - 1.2 mg/dL Final    GGT 08/09/2023 24  0 - 65 IU/L Final    ALT (SGPT) 08/09/2023 24  0 - 55 IU/L Final    AST (SGOT) P5P (Reference) 08/09/2023 26  0 - 40 IU/L Final    Cholesterol, Total (Reference) 08/09/2023 161  100 - 199 mg/dL Final    Glucose, Serum (Reference) 08/09/2023 112 (H)  70 - 99 mg/dL Final    Triglycerides 08/09/2023 133  0 - 149 mg/dL Final    Interpretations: (Reference) 08/09/2023 Comment   Final    Comment: Quantitative results of 10 biochemicals in combination with age and  gender, are analyzed using a computational algorithm to provide a  quantitative surrogate marker (0.0-1.0) of liver fibrosis (Metavir  F0-F4), hepatic steatosis (0.0-1.0, S0-S3), and Non-Alcoholic  Steato-Hepatitis (SU) (0.0-1.0, N0-N3). The absence of steatosis  (S<0.40) precludes the diagnosis of SU.  Fibrosis marker: In a study of 171 Non-Alcoholic Fatty Liver Disease  (NAFLD) patients where 23% had significant NAFLD fibrosis (Metavir  F2-F4) and 11% had cirrhosis by liver biopsy, a fibrosis result of  >0.3 yielded a sensitivity of 83% and a specificity of 78% for the  detection of significant fibrosis.[1]  Steatosis marker: In a population of 2997 patients, where 61% had  significant steatosis (>=5%) on a liver biopsy, a steatosis score  >0.4 had a sensitivity of 79% and a specificity of 50% for  identification of significant steatosis.[2]  SU marker: In a population of 1081 NAFLD patients, where 51% had  at                            least some SU by liver biopsy, a prediction of SU had a  sensitivity of 72% for identifying SU and a specificity of 71%.[3]    Fibrosis  Scorin2023 Comment   Final         <=0.21 = Stage F0 - No fibrosis  0.21 - 0.27 = Stage F0 - F1  0.27 - 0.31 = Stage F1 - Portal fibrosis  0.31 - 0.48 = Stage F1 - F2  0.48 - 0.58 = Stage F2 - Bridging fibrosis with few septa  0.58 - 0.72 = Stage F3 - Bridging fibrosis with many septa  0.72 - 0.74 = Stage F3 - F4        >0.74 = Stage F4 - Cirrhosis    Steatosis Scoring 2023 Comment   Final         <=0.40 = S0  - No Steatosis (<5%)  0.40 - 0.55 = S1  - Mild Steatosis                      (but Clinically Significant) (5-33%)        >0.55 = S2S3- Moderate to Severe Steatosis                      (Clinically Significant) (%)    SU Scoring 2023 Comment   Final         <=0.25 = N0 - No SU  0.25 - 0.50 = N1 - Mild SU  0.50 - 0.75 = N2 - Moderate SU        >0.75 = N3 - Severe SU    Limitations: 2023 Comment   Final    SU FibroSure(R) Plus is recommended for patients with suspected  non-alcoholic fatty liver disease. It is not recommended for  patients with other liver diseases. It is also not recommended  in patients with Gilbert Disease, acute hemolysis, acute viral  hepatitis, drug induced hepatitis, genetic liver disease,  autoimmune hepatitis and/or extra-hepatic cholestasis. Any of  these clinical situations may lead to inaccurate quantitative  predictions of fibrosis.    Comment 2023 Comment   Final    This test was developed and its performance characteristics  determined by [employer]. It has not been cleared or approved  by the Food and Drug Administration.  For questions regarding this report please contact customer service  at [phone].  References:  1.  Holland ROBERTS. et al. Diagnostic Value of Biochemical Markers  (FibroTest) for the prediction of Liver Fibrosis in patients with  Non-Alcoholic Fatty Liver Disease. BMC Gastroenterology 2006; 6:6.  2.  Juma PHAN. et al. The Diagnostic Performance of a Simplified  Blood Test (SteatoTest-2) for the Prediction of  Liver Steatosis.  Eur J Gastroenterol Hepatol. 2019; 31:393-402.  3.  Juma SAMANIEGO et al. Diagnostic performance of a new noninvasive  test for nonalcoholic steatohepatitis using a simplified histological  reference. Eur J Gastroenterol Hepatol. 2018 May; 30:569-577.    Mitochondrial Ab 08/09/2023 <20.0  0.0 - 20.0 Units Final                                    Negative    0.0 - 20.0                                  Equivocal  20.1 - 24.9                                  Positive         >24.9  Mitochondrial (M2) Antibodies are found in 90-96% of  patients with primary biliary cirrhosis.    Iron 08/09/2023 95  59 - 158 mcg/dL Final    Iron Saturation (TSAT) 08/09/2023 23  20 - 50 % Final    Transferrin 08/09/2023 278  200 - 360 mg/dL Final    TIBC 08/09/2023 414  298 - 536 mcg/dL Final    Hepatitis B Surface Ag 08/09/2023 Non-Reactive  Non-Reactive Final    Hep A IgM 08/09/2023 Non-Reactive  Non-Reactive Final    Hep B C IgM 08/09/2023 Non-Reactive  Non-Reactive Final    Hepatitis C Ab 08/09/2023 Non-Reactive  Non-Reactive Final    Ferritin 08/09/2023 54.80  30.00 - 400.00 ng/mL Final    ALPHA -1 ANTITRYPSIN 08/09/2023 136  90 - 200 mg/dL Final    WBC 08/09/2023 3.77  3.40 - 10.80 10*3/mm3 Final    RBC 08/09/2023 5.11  4.14 - 5.80 10*6/mm3 Final    Hemoglobin 08/09/2023 15.3  13.0 - 17.7 g/dL Final    Hematocrit 08/09/2023 45.2  37.5 - 51.0 % Final    MCV 08/09/2023 88.5  79.0 - 97.0 fL Final    MCH 08/09/2023 29.9  26.6 - 33.0 pg Final    MCHC 08/09/2023 33.8  31.5 - 35.7 g/dL Final    RDW 08/09/2023 14.0  12.3 - 15.4 % Final    RDW-SD 08/09/2023 44.6  37.0 - 54.0 fl Final    MPV 08/09/2023 10.3  6.0 - 12.0 fL Final    Platelets 08/09/2023 235  140 - 450 10*3/mm3 Final    Neutrophil % 08/09/2023 46.6  42.7 - 76.0 % Final    Lymphocyte % 08/09/2023 38.5  19.6 - 45.3 % Final    Monocyte % 08/09/2023 8.0  5.0 - 12.0 % Final    Eosinophil % 08/09/2023 5.0  0.3 - 6.2 % Final    Basophil % 08/09/2023 1.6 (H)  0.0 - 1.5 %  Final    Immature Grans % 08/09/2023 0.3  0.0 - 0.5 % Final    Neutrophils, Absolute 08/09/2023 1.76  1.70 - 7.00 10*3/mm3 Final    Lymphocytes, Absolute 08/09/2023 1.45  0.70 - 3.10 10*3/mm3 Final    Monocytes, Absolute 08/09/2023 0.30  0.10 - 0.90 10*3/mm3 Final    Eosinophils, Absolute 08/09/2023 0.19  0.00 - 0.40 10*3/mm3 Final    Basophils, Absolute 08/09/2023 0.06  0.00 - 0.20 10*3/mm3 Final    Immature Grans, Absolute 08/09/2023 0.01  0.00 - 0.05 10*3/mm3 Final    nRBC 08/09/2023 0.0  0.0 - 0.2 /100 WBC Final        Procedure: None  Procedures     I have reviewed and agree with the above PMH, PSH, FMH, social history, medications, allergies, and labs.     Assessment/Plan:   Problem List Items Addressed This Visit          Genitourinary and Reproductive     Phimosis - Primary    Relevant Medications    sulfamethoxazole-trimethoprim (Bactrim DS) 800-160 MG per tablet     Other Visit Diagnoses       Balanitis xerotica obliterans        Relevant Medications    clotrimazole-betamethasone (Lotrisone) 1-0.05 % cream            Health Maintenance:   Health Maintenance Due   Topic Date Due    COLORECTAL CANCER SCREENING  Never done    TDAP/TD VACCINES (1 - Tdap) Never done    ANNUAL WELLNESS VISIT  Never done    BMI FOLLOWUP  11/26/2020    LIPID PANEL  Never done        Smoking Counseling: Never smoked or use smokeless tobacco    Urine Incontinence: Patient reports that he is not currently experiencing any symptoms of urinary incontinence.    Patient was given instructions and counseling regarding his condition or for health maintenance advice. Please see specific information pulled into the AVS if appropriate.    Patient Education:   Aftercare circumcision -patient is roughly 1 week postop of circumcision and is doing well.  He still has some scant discharge and notable inflammation.  Advised him to continue to clean the area with warm soap and water twice daily and dry appropriately.  Advised him to use triple  antibiotic ointment after drying appropriately.  I will also send in Lotrisone cream to apply to the affected area as needed for balanitis.  Discussed with the patient his pathology reports.  Discussed the risk and benefits of this medication with the patient.  Patient verbalized understanding.  We will schedule him back in 1 month to see Dr. Jimenez.  Ureteral stricture -patient did undergo a cystoscopy with urethral dilation prior due to stricture.  States he is urinating much better.  We will have him discontinue terazosin at this time.  Advised him symptoms worsen to return to the clinic sooner.  Otherwise we will see him back in 1 month.  Patient verbalized understanding and agreed to plan of care.    Visit Diagnoses:    ICD-10-CM ICD-9-CM   1. 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     Dispense:  10 tablet     Refill:  0    clotrimazole-betamethasone (Lotrisone) 1-0.05 % cream     Sig: Apply 1 application  topically to the appropriate area as directed 2 (Two) Times a Day.     Dispense:  45 g     Refill:  2       Follow Up Appointment:   No follow-ups on file.      This document has been electronically signed by Joe Holman PA-C   October 30, 2023 13:36 EDT    Part of this note may be an electronic transcription/translation of spoken language to printed text using the Dragon Dictation System.

## 2023-11-29 ENCOUNTER — TELEPHONE (OUTPATIENT)
Dept: FAMILY MEDICINE CLINIC | Facility: CLINIC | Age: 53
End: 2023-11-29
Payer: MEDICARE

## 2023-11-29 DIAGNOSIS — J45.50 SEVERE PERSISTENT ASTHMA WITHOUT COMPLICATION: ICD-10-CM

## 2023-11-29 RX ORDER — MONTELUKAST SODIUM 10 MG/1
10 TABLET ORAL NIGHTLY
Qty: 30 TABLET | Refills: 5 | Status: SHIPPED | OUTPATIENT
Start: 2023-11-29

## 2023-11-29 RX ORDER — MONTELUKAST SODIUM 10 MG/1
10 TABLET ORAL NIGHTLY
Qty: 30 TABLET | Refills: 5 | OUTPATIENT
Start: 2023-11-29

## 2023-12-04 ENCOUNTER — CLINICAL SUPPORT (OUTPATIENT)
Dept: PULMONOLOGY | Facility: CLINIC | Age: 53
End: 2023-12-04
Payer: MEDICARE

## 2023-12-04 DIAGNOSIS — J45.50 SEVERE PERSISTENT ASTHMA WITHOUT COMPLICATION: Primary | ICD-10-CM

## 2023-12-19 ENCOUNTER — OFFICE VISIT (OUTPATIENT)
Dept: UROLOGY | Facility: CLINIC | Age: 53
End: 2023-12-19
Payer: MEDICARE

## 2023-12-19 VITALS
HEIGHT: 70 IN | DIASTOLIC BLOOD PRESSURE: 78 MMHG | BODY MASS INDEX: 26.77 KG/M2 | WEIGHT: 187 LBS | HEART RATE: 77 BPM | SYSTOLIC BLOOD PRESSURE: 116 MMHG

## 2023-12-19 DIAGNOSIS — N47.1 PHIMOSIS: ICD-10-CM

## 2023-12-19 DIAGNOSIS — R39.11 BENIGN PROSTATIC HYPERPLASIA WITH URINARY HESITANCY: ICD-10-CM

## 2023-12-19 DIAGNOSIS — N35.010 POST-TRAUMATIC MALE URETHRAL MEATAL STRICTURE: Primary | ICD-10-CM

## 2023-12-19 DIAGNOSIS — N40.1 BENIGN PROSTATIC HYPERPLASIA WITH URINARY HESITANCY: ICD-10-CM

## 2023-12-19 PROCEDURE — 99213 OFFICE O/P EST LOW 20 MIN: CPT | Performed by: UROLOGY

## 2023-12-19 PROCEDURE — 1159F MED LIST DOCD IN RCRD: CPT | Performed by: UROLOGY

## 2023-12-19 PROCEDURE — 1160F RVW MEDS BY RX/DR IN RCRD: CPT | Performed by: UROLOGY

## 2023-12-19 NOTE — PROGRESS NOTES
Chief Complaint:      Chief Complaint   Patient presents with    Phimosis       HPI:   Patient presents to the clinic for postop follow-up.  Patient underwent elective circumcision by Dr. Jimenez roughly 1 week ago.  He was sent home with cephalexin twice daily for 4 days.  Patient states that since surgery he still had some inflammation and swelling.  He does endorse a improvement in urinary stream.  He also had a cystoscopy completed at the same time as circumcision.  Dr. Jimenez noted that he had a significant narrowing of the urethral meatus and dilated him to 24 French.  After he was able to pass the cystoscope he identified a normal urethra, prostate, and bladder.  No abnormalities were noted.  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  Physical exam today patient does have significant inflammation of the glans of the penis with scant yellow discharge.   He returns to clinic today he is doing great no complaints problems no issues I will see him back on an as-needed basis he has an excellent cosmetic result  Past Medical History:     Past Medical History:   Diagnosis Date    Anxiety     Arthritis     Asthma     GERD (gastroesophageal reflux disease)     Headache     Low back pain     Sleep apnea     no c-pap       Current Meds:     Current Outpatient Medications   Medication Sig Dispense Refill    albuterol sulfate  (90 Base) MCG/ACT inhaler Inhale 2 puffs Every 4 (Four) Hours As Needed for Wheezing. 18 g 8    benzonatate (TESSALON) 100 MG capsule Take 1 capsule by mouth 3 (Three) Times a Day As Needed for Cough. 42 capsule 6    budesonide-formoterol (Symbicort) 160-4.5 MCG/ACT inhaler Inhale 2 puffs 2 (Two) Times a Day. 1 each 8    busPIRone (BUSPAR) 30 MG tablet Take 1 tablet by mouth Daily.      cephalexin (Keflex) 500 MG capsule Take 1 capsule by mouth 2 (Two) Times a Day. 8 capsule 0    clonazePAM (KlonoPIN) 0.5 MG tablet Take 1 tablet by mouth 2  (Two) Times a Day As Needed.      clotrimazole-betamethasone (Lotrisone) 1-0.05 % cream Apply 1 application  topically to the appropriate area as directed 2 (Two) Times a Day. 45 g 2    diclofenac (VOLTAREN) 75 MG EC tablet Take 1 tablet by mouth 2 (Two) Times a Day As Needed.      DULoxetine (CYMBALTA) 60 MG capsule Take 1 capsule by mouth 2 (Two) Times a Day.      famotidine (Pepcid) 40 MG tablet Take 1 tablet by mouth At Night As Needed for Heartburn. 30 tablet 5    gabapentin (NEURONTIN) 600 MG tablet Take 1 tablet by mouth 3 (Three) Times a Day.      HYDROcodone-acetaminophen (NORCO)  MG per tablet Take 1 tablet by mouth Every 6 (Six) Hours As Needed for Moderate (Pain). 16 tablet 0    hydrOXYzine (ATARAX) 50 MG tablet Take 1 tablet by mouth 2 (Two) Times a Day.      lansoprazole (PREVACID) 30 MG capsule Take 1 capsule by mouth 2 (Two) Times a Day. Take one capsule 30 minutes prior to eating breakfast 60 capsule 5    methocarbamol (ROBAXIN) 750 MG tablet Take 1 tablet by mouth 4 (Four) Times a Day As Needed for Muscle Spasms.      montelukast (SINGULAIR) 10 MG tablet TAKE 1 TABLET BY MOUTH EVERY NIGHT 30 tablet 5    OLANZapine (zyPREXA) 7.5 MG tablet Take 1 tablet by mouth Every Night.      propranolol (INDERAL) 20 MG tablet Take 2 tablets by mouth 2 (Two) Times a Day.      QUEtiapine (SEROquel) 100 MG tablet Take 1 tablet by mouth Every Night.      rOPINIRole (REQUIP) 4 MG tablet Take 1 tablet by mouth Every Night.      sulfamethoxazole-trimethoprim (Bactrim DS) 800-160 MG per tablet Take 1 tablet by mouth Every 12 (Twelve) Hours. 10 tablet 0    terazosin (HYTRIN) 5 MG capsule Take 1 capsule by mouth Every Night. 30 capsule 3    Tezepelumab-ekko (Tezspire) 210 MG/1.91ML injection Inject 210 mg under the skin into the appropriate area as directed Every 28 (Twenty-Eight) Days. 1.91 mL 5    Tiotropium Bromide Monohydrate (Spiriva Respimat) 1.25 MCG/ACT aerosol solution inhaler Inhale 2 puffs Daily. 4 g 8     HYDROcodone-acetaminophen (NORCO)  MG per tablet Take 1 tablet by mouth 3 (Three) Times a Day.       No current facility-administered medications for this visit.        Allergies:      No Known Allergies     Past Surgical History:     Past Surgical History:   Procedure Laterality Date    CIRCUMCISION N/A 10/23/2023    Procedure: CIRCUMCISION AND CYSTOSCOPY;  Surgeon: Jhon Jimenez MD;  Location: [facility] OR;  Service: Urology;  Laterality: N/A;    COLONOSCOPY      ENDOSCOPY      with dilatation    KS MYELOGRAPHY VIA LUMBAR INJECTION RS&I CERVICAL N/A 10/29/2019    Procedure: IR myelogram, cervical;  Surgeon: Jose Daniel Kirby MD;  Location:  [facility];  Service: Interventional Radiology    URETHRAL DILATATION N/A 10/23/2023    Procedure: URETHRAL DILATATION;  Surgeon: Jhon Jimenez MD;  Location: [facility] OR;  Service: Urology;  Laterality: N/A;       Social History:     Social History     Socioeconomic History    Marital status:    Tobacco Use    Smoking status: Never     Passive exposure: Never    Smokeless tobacco: Never   Vaping Use    Vaping Use: Never used   Substance and Sexual Activity    Alcohol use: No    Drug use: No    Sexual activity: Defer       Family History:     Family History   Problem Relation Age of Onset    Heart disease Mother     Arthritis Mother     Diabetes Mother     Heart disease Father     Arthritis Father        Review of Systems:     Review of Systems   Constitutional: Negative.    HENT: Negative.     Eyes: Negative.    Respiratory: Negative.     Cardiovascular: Negative.    Gastrointestinal: Negative.    Endocrine: Negative.    Musculoskeletal: Negative.    Allergic/Immunologic: Negative.    Neurological: Negative.    Hematological: Negative.    Psychiatric/Behavioral: Negative.         Physical Exam:     Physical Exam  Vitals and nursing note reviewed.   Constitutional:       Appearance: He is well-developed.   HENT:      Head:  Normocephalic and atraumatic.   Eyes:      Conjunctiva/sclera: Conjunctivae normal.      Pupils: Pupils are equal, round, and reactive to light.   Cardiovascular:      Rate and Rhythm: Normal rate and regular rhythm.      Heart sounds: Normal heart sounds.   Pulmonary:      Effort: Pulmonary effort is normal.      Breath sounds: Normal breath sounds.   Abdominal:      General: Bowel sounds are normal.      Palpations: Abdomen is soft.   Musculoskeletal:         General: Normal range of motion.      Cervical back: Normal range of motion.   Skin:     General: Skin is warm and dry.   Neurological:      Mental Status: He is alert and oriented to person, place, and time.      Deep Tendon Reflexes: Reflexes are normal and symmetric.   Psychiatric:         Behavior: Behavior normal.         Thought Content: Thought content normal.         Judgment: Judgment normal.         I have reviewed the following portions of the patient's history: Allergies, current medications, past family history, past medical history, past social history, past surgical history, problem list, and ROS and confirm it is accurate.    Recent Image (CT and/or KUB):      CT Abdomen and Pelvis: No results found for this or any previous visit.       CT Stone Protocol: No results found for this or any previous visit.       KUB: No results found for this or any previous visit.       Labs (past 3 months):      Admission on 10/23/2023, Discharged on 10/23/2023   Component Date Value Ref Range Status    Reference Lab Report 10/23/2023    Final                    Value:[facility]  [address] [place] [address]  Phone: [phone] or [phone]  Fax: [phone]  Danial Savage M.D., Medical Director    PATIENT NAME                           LABORATORY NO.  TREVER VILLATORO.                   [id number]  [id number]                         AGE              SEX  SSN           CLIENT REF #  [facility]   "            52      1970      [id number]   [id number]    [address] [place] [address]                     REQUESTING M.D.     ATTENDING [place] [address]                   HATTIE JUDD  DATE COLLECTED      DATE RECEIVED      DATE REPORTED  10/23/2023          10/23/2023         10/25/2023    DIAGNOSIS:  FORESKIN:  Chronic balanitis with fibrosis (see comment)    CAM    COMMENT:  The foreskin is focally involved by lichenoid chronic  inflammation with underlying fibrosis.  The changes are suggestive of balanitis  xerotica obliterans.    CLINICAL HISTORY:  Phimosis, benign                           prostatic hyperplasia with urinary hesitancy    SPECIMENS RECEIVED:  FORESKIN    MICROSCOPIC DESCRIPTION:  Tissue blocks are prepared and slides are examined microscopically. See  diagnosis for details.    Professional interpretation rendered by Anita Gil M.D., AMADO at  [employer] [address] [facility], [place] [address].    GROSS DESCRIPTION:  Specimen received in formalin labeled \"foreskin\" and consists of a 3.4 x 3.3 x  1.2 cm unoriented, irregularly shaped portion of tan wrinkled foreskin.  No  lesions or ulcerated areas are grossly identified.  Surgical margin is inked blue.  Sectioning reveals a tan partially edematous cut surface.  No areas of nodularity  or other discrete mass lesions are grossly identified.  Representative sections are  submitted in a single cassette.  JTM/RLL    REVIEWED, DIAGNOSED AND ELECTRONICALLY  SIGNED BY:    Anita Gil M.D., JEANETTEA.P.  CPT CODES:  22642     Pre-Admission Testing on 10/20/2023   Component Date Value Ref Range Status    QT Interval 10/20/2023 374  ms Final    QTC Interval 10/20/2023 403  ms Final   Hospital Outpatient Visit on 09/27/2023   Component Date Value Ref Range Status    Protime 09/27/2023 13.1  12.1 - 14.7 Seconds Final    INR 09/27/2023 0.95  0.90 - 1.10 Final    Platelets 09/27/2023 212  140 - 450 10*3/mm3 Final    Reference " "Lab Report 2023    Final                    Value:[facility]  [address] [place] [address]  Phone: [phone] or [phone]  Fax: [phone]  Danial Savage M.D., Medical Director    PATIENT NAME                           LABORATORY NO.  TREVER VILLATORO.                   [id number]  [id number]                         AGE              SEX  SSN           CLIENT REF #  [facility]              52      1970      [id number]   [id number]    [address]                     REQUESTING M.D.     ATTENDING M.D.     COPY TO.  [place] [address]                   RUIZ JOSÉ  DATE COLLECTED      DATE RECEIVED      DATE REPORTED  2023    DIAGNOSIS:  LIVER, NEEDLE CORE BIOPSIES:  Scant fragments of fibrovascular connective tissue  Negative for evidence of liver parenchyma.  See comment    COMMENT:  Biopsies show only scant fragments of fibrovascular connective  tissue.  No true fragments with liver parenchyma are present.                           Multiple deeper  levels were also examined. This may represent liver capsule.  Follow-up is  recommended as clinically indicated.    CLINICAL HISTORY:  Fatty liver    SPECIMENS RECEIVED:  LIVER, NEEDLE CORE BIOPSIES    MICROSCOPIC DESCRIPTION:  Tissue blocks are prepared and slides are examined microscopically on all  specimens. See diagnosis for details.    Professional interpretation rendered by Eduar Mccoy M.D., F.C.A.P. at [employer], [address] [place] [address], [place] [address].    GROSS DESCRIPTION:  Labeled as \"liver\", consisting of multiple pieces of fragmented tan needle core.  Specimen is dyed with eosin, filtered, measuring 0.4 x 0.3 x 0.1 cm in aggregate,  submitted entirely in 1 cassette.  SOG    REVIEWED, DIAGNOSED AND ELECTRONICALLY  SIGNED BY:    Eduar Mccoy M.D., F.C.A.P.  CPT CODES:  06189     Admission on 2023, Discharged on " 09/27/2023   Component Date Value Ref Range Status    Glucose 09/27/2023 154 (H)  65 - 99 mg/dL Final    BUN 09/27/2023 22 (H)  6 - 20 mg/dL Final    Creatinine 09/27/2023 1.37 (H)  0.76 - 1.27 mg/dL Final    Sodium 09/27/2023 135 (L)  136 - 145 mmol/L Final    Potassium 09/27/2023 4.4  3.5 - 5.2 mmol/L Final    Slight hemolysis detected by analyzer. Results may be affected.    Chloride 09/27/2023 102  98 - 107 mmol/L Final    CO2 09/27/2023 22.1  22.0 - 29.0 mmol/L Final    Calcium 09/27/2023 9.5  8.6 - 10.5 mg/dL Final    Total Protein 09/27/2023 7.2  6.0 - 8.5 g/dL Final    Albumin 09/27/2023 4.5  3.5 - 5.2 g/dL Final    ALT (SGPT) 09/27/2023 22  1 - 41 U/L Final    AST (SGOT) 09/27/2023 21  1 - 40 U/L Final    Alkaline Phosphatase 09/27/2023 99  39 - 117 U/L Final    Total Bilirubin 09/27/2023 0.8  0.0 - 1.2 mg/dL Final    Globulin 09/27/2023 2.7  gm/dL Final    A/G Ratio 09/27/2023 1.7  g/dL Final    BUN/Creatinine Ratio 09/27/2023 16.1  7.0 - 25.0 Final    Anion Gap 09/27/2023 10.9  5.0 - 15.0 mmol/L Final    eGFR 09/27/2023 61.3  >60.0 mL/min/1.73 Final    PTT 09/27/2023 25.2 (L)  26.5 - 34.5 seconds Final    Protime 09/27/2023 13.1  12.1 - 14.7 Seconds Final    INR 09/27/2023 0.95  0.90 - 1.10 Final    WBC 09/27/2023 6.59  3.40 - 10.80 10*3/mm3 Final    RBC 09/27/2023 5.35  4.14 - 5.80 10*6/mm3 Final    Hemoglobin 09/27/2023 15.9  13.0 - 17.7 g/dL Final    Hematocrit 09/27/2023 47.2  37.5 - 51.0 % Final    MCV 09/27/2023 88.2  79.0 - 97.0 fL Final    MCH 09/27/2023 29.7  26.6 - 33.0 pg Final    MCHC 09/27/2023 33.7  31.5 - 35.7 g/dL Final    RDW 09/27/2023 13.0  12.3 - 15.4 % Final    RDW-SD 09/27/2023 41.8  37.0 - 54.0 fl Final    MPV 09/27/2023 9.5  6.0 - 12.0 fL Final    Platelets 09/27/2023 232  140 - 450 10*3/mm3 Final    Neutrophil % 09/27/2023 47.6  42.7 - 76.0 % Final    Lymphocyte % 09/27/2023 38.4  19.6 - 45.3 % Final    Monocyte % 09/27/2023 9.4  5.0 - 12.0 % Final    Eosinophil % 09/27/2023 3.2   0.3 - 6.2 % Final    Basophil % 09/27/2023 1.2  0.0 - 1.5 % Final    Immature Grans % 09/27/2023 0.2  0.0 - 0.5 % Final    Neutrophils, Absolute 09/27/2023 3.14  1.70 - 7.00 10*3/mm3 Final    Lymphocytes, Absolute 09/27/2023 2.53  0.70 - 3.10 10*3/mm3 Final    Monocytes, Absolute 09/27/2023 0.62  0.10 - 0.90 10*3/mm3 Final    Eosinophils, Absolute 09/27/2023 0.21  0.00 - 0.40 10*3/mm3 Final    Basophils, Absolute 09/27/2023 0.08  0.00 - 0.20 10*3/mm3 Final    Immature Grans, Absolute 09/27/2023 0.01  0.00 - 0.05 10*3/mm3 Final    nRBC 09/27/2023 0.0  0.0 - 0.2 /100 WBC Final    Extra Tube 09/27/2023 Hold for add-ons.   Final    Auto resulted.    Extra Tube 09/27/2023 hold for add-on   Final    Auto resulted    Extra Tube 09/27/2023 Hold for add-ons.   Final    Auto resulted.    Extra Tube 09/27/2023 Hold for add-ons.   Final    Auto resulted        Procedure:       Assessment/Plan:   Phimosis-status post an elective circumcision with excellent cosmetic results we will see him back on an as-needed basis        This document has been electronically signed by HATTIE JUDD MD December 19, 2023 08:54 EST    Dictated Utilizing Dragon Dictation: Part of this note may be an electronic transcription/translation of spoken language to printed text using the Dragon Dictation System.

## 2024-01-04 ENCOUNTER — CLINICAL SUPPORT (OUTPATIENT)
Dept: PULMONOLOGY | Facility: CLINIC | Age: 54
End: 2024-01-04
Payer: MEDICARE

## 2024-01-04 DIAGNOSIS — J45.50 SEVERE PERSISTENT ASTHMA WITHOUT COMPLICATION: Primary | ICD-10-CM

## 2024-01-08 ENCOUNTER — SPECIALTY PHARMACY (OUTPATIENT)
Dept: PHARMACY | Facility: HOSPITAL | Age: 54
End: 2024-01-08
Payer: MEDICARE

## 2024-01-08 RX ORDER — TEZEPELUMAB-EKKO 210 MG/1.9ML
210 INJECTION, SOLUTION SUBCUTANEOUS
Qty: 1.91 ML | Refills: 5 | Status: SHIPPED | OUTPATIENT
Start: 2024-01-08

## 2024-01-08 NOTE — PROGRESS NOTES
[facility]  Asthma Management       Reji Linares is a 53 y.o. male referred by Pulmonology to the [facility] for severe  asthma.  The patient's current asthma regimen includes: Symbicort, Spiriva, Tezspire and Singulair and Pt reports good adherence to oral medications. He was previously receiving medication through [facility] [employer] but has now been approved for a sharron. Medication is being shipped to the Pulmonology office to be given by the MA there. Pt reports he is not a smoker or exposed to second hand smoke.     Reji Linares reports asthma kept them from getting as much done some of the time and having shortness of breath once a day.   Pt reports no nighttime awakenings due to asthma symptoms and using a rescue inhaler 1-2 times per day.      Pt self rates asthma control as Well Controlled.     He reports that he can tell a difference with Tezspire on board and denies any side effects from the medication. He gets the injections administered by the MA at the Pulmonology clinic and has not missed any doses.        Past Medical History:   Diagnosis Date    Anxiety     Arthritis     Asthma     GERD (gastroesophageal reflux disease)     Headache     Low back pain     Sleep apnea     no c-pap     Social History     Socioeconomic History    Marital status:    Tobacco Use    Smoking status: Never     Passive exposure: Never    Smokeless tobacco: Never   Vaping Use    Vaping Use: Never used   Substance and Sexual Activity    Alcohol use: No    Drug use: No    Sexual activity: Defer     Patient has no known allergies.    Current Outpatient Medications:     albuterol sulfate  (90 Base) MCG/ACT inhaler, Inhale 2 puffs Every 4 (Four) Hours As Needed for Wheezing., Disp: 18 g, Rfl: 8    benzonatate (TESSALON) 100 MG capsule, Take 1 capsule by mouth 3 (Three) Times a Day As Needed for Cough., Disp: 42 capsule, Rfl: 6    busPIRone (BUSPAR) 30 MG tablet, Take 1 tablet by  mouth Daily., Disp: , Rfl:     clonazePAM (KlonoPIN) 0.5 MG tablet, Take 1 tablet by mouth 2 (Two) Times a Day As Needed., Disp: , Rfl:     diclofenac (VOLTAREN) 75 MG EC tablet, Take 1 tablet by mouth 2 (Two) Times a Day As Needed., Disp: , Rfl:     DULoxetine (CYMBALTA) 60 MG capsule, Take 1 capsule by mouth 2 (Two) Times a Day., Disp: , Rfl:     famotidine (Pepcid) 40 MG tablet, Take 1 tablet by mouth At Night As Needed for Heartburn., Disp: 30 tablet, Rfl: 5    gabapentin (NEURONTIN) 600 MG tablet, Take 1 tablet by mouth 3 (Three) Times a Day., Disp: , Rfl:     HYDROcodone-acetaminophen (NORCO)  MG per tablet, Take 1 tablet by mouth Every 6 (Six) Hours As Needed for Moderate (Pain)., Disp: 16 tablet, Rfl: 0    hydrOXYzine (ATARAX) 50 MG tablet, Take 1 tablet by mouth 2 (Two) Times a Day., Disp: , Rfl:     lansoprazole (PREVACID) 30 MG capsule, Take 1 capsule by mouth 2 (Two) Times a Day. Take one capsule 30 minutes prior to eating breakfast, Disp: 60 capsule, Rfl: 5    methocarbamol (ROBAXIN) 750 MG tablet, Take 1 tablet by mouth 4 (Four) Times a Day As Needed for Muscle Spasms., Disp: , Rfl:     montelukast (SINGULAIR) 10 MG tablet, TAKE 1 TABLET BY MOUTH EVERY NIGHT, Disp: 30 tablet, Rfl: 5    OLANZapine (zyPREXA) 7.5 MG tablet, Take 1 tablet by mouth Every Night., Disp: , Rfl:     propranolol (INDERAL) 20 MG tablet, Take 2 tablets by mouth 2 (Two) Times a Day., Disp: , Rfl:     QUEtiapine (SEROquel) 100 MG tablet, Take 1 tablet by mouth Every Night., Disp: , Rfl:     rOPINIRole (REQUIP) 4 MG tablet, Take 1 tablet by mouth Every Night., Disp: , Rfl:     terazosin (HYTRIN) 5 MG capsule, Take 1 capsule by mouth Every Night., Disp: 30 capsule, Rfl: 3    Tezepelumab-ekko (Tezspire) 210 MG/1.91ML injection, Inject 210 mg under the skin into the appropriate area as directed Every 28 (Twenty-Eight) Days., Disp: 1.91 mL, Rfl: 5    Tiotropium Bromide Monohydrate (Spiriva Respimat) 1.25 MCG/ACT aerosol solution  inhaler, Inhale 2 puffs Daily., Disp: 4 g, Rfl: 8  No current facility-administered medications for this visit.        Asthma Control Test Results:   0-15 Very Poorly Controlled Asthma   15-20 Poorly Controlled Asthma  20-25 Well Controlled Asthma     Date  8/26/22 3/30/23 9/12/23 1/8/24     ACT Results 15 11 15 16     ACT Results Very Poorly  Controlled Asthma  Very Poorly  Controlled Asthma  Poorly  Controlled Asthma Poorly Controlled Asthma         Vaccination Status   COVID 19: wants bivalent  Influenza: UTD  Pneumococcal: UTD  Zoster: Needed: UTD    Drug-Drug Interactions: None with Tezspire    Drug-Disease Interactions (non-cardioselective beta blockers, NSAIDs): Propranolol (for anxiety); Diclofenac    Patient Assistance: Not required    Inhaler Technique Observed? No    Treatment Goals: Risk Reduction and Symptom Control     Medication Assessment & Plan:     Patient will continue Tezspire 210 mg SubQ every 4 weeks for severe asthma given by Pulmonology MA. Advised Pt to avoid live vaccines while on this medication.     Advised Pt on the importance of continuing maintenance inhaler regimen and the importance of rinsing mouth after ICS use. This injection does not replace your maintenance inhaler and is not used to treat an asthma attack (use a rescue inhaler).     Patient is on a non-selective beta blocker, propranolol, but reports relief from his albuterol inhaler when he does have to use it.  Will inform provider.     Patient will continue regular follow-up with pulmonology. Patient will follow-up with specialty mail out services and continue receiving injections in pulmonology clinic.      Will follow-up in 6 months, or sooner if needed.     Alice Burnham, PharmD  1/8/2024  10:03 EST

## 2024-01-24 ENCOUNTER — SPECIALTY PHARMACY (OUTPATIENT)
Dept: PHARMACY | Facility: HOSPITAL | Age: 54
End: 2024-01-24
Payer: MEDICARE

## 2024-01-24 NOTE — PROGRESS NOTES
Specialty Pharmacy Refill Coordination Note     Reji is a 53 y.o. male contacted today regarding refills of  Tezspire specialty medication(s).    Reviewed and verified with patient:       Specialty medication(s) and dose(s) confirmed: yes    Refill Questions      Flowsheet Row Most Recent Value   Changes to allergies? No   Changes to medications? No   New conditions or infections since last clinic visit No   Unplanned office visit, urgent care, ED, or hospital admission in the last 4 weeks  No   How does patient/caregiver feel medication is working? Very good   Financial problems or insurance changes  No   If yes, describe changes in insurance or financial issues. na   Since the previous refill, were any specialty medication doses or scheduled injections missed or delayed?  No   If yes, please provide the amount na   Why were doses missed? na   Does this patient require a clinical escalation to a pharmacist? No            Delivery Questions      Flowsheet Row Most Recent Value   Copay verified? No   Copay amount na   Copay form of payment No copayment ($0)                   Follow-up: 30 day(s)     Corinna Valdes, Pharmacy Technician  Specialty Pharmacy Technician

## 2024-02-01 ENCOUNTER — CLINICAL SUPPORT (OUTPATIENT)
Dept: PULMONOLOGY | Facility: CLINIC | Age: 54
End: 2024-02-01
Payer: MEDICARE

## 2024-02-01 DIAGNOSIS — J45.50 SEVERE PERSISTENT ASTHMA WITHOUT COMPLICATION: Primary | ICD-10-CM

## 2024-02-13 ENCOUNTER — TELEPHONE (OUTPATIENT)
Dept: PULMONOLOGY | Facility: CLINIC | Age: 54
End: 2024-02-13

## 2024-02-13 NOTE — TELEPHONE ENCOUNTER
Provider: LISANDRO GALVEZ    Caller: TREVER BARRIENTOS    Relationship to Patient: SELF    Reason for Call: PATIENT NEEDS A DIAGNOSIS [facility] TO [facility] THEY HELP HIM PAY FOR PRESCRIPTIONS, AND COPAYS.  #[phone], EMAIL ADDRESS:  [email or web address], OR MAILING ADDRESS: [employer]  [address] [place] [id number]    PLEASE ADVISE

## 2024-02-21 ENCOUNTER — SPECIALTY PHARMACY (OUTPATIENT)
Dept: PHARMACY | Facility: HOSPITAL | Age: 54
End: 2024-02-21
Payer: COMMERCIAL

## 2024-02-28 PROBLEM — G89.29 CHRONIC PAIN: Status: ACTIVE | Noted: 2022-11-04

## 2024-02-28 PROBLEM — F41.9 ANXIETY: Status: ACTIVE | Noted: 2024-02-28

## 2024-02-28 RX ORDER — ROPINIROLE 3 MG/1
TABLET, FILM COATED ORAL
COMMUNITY
Start: 2024-02-21

## 2024-02-28 RX ORDER — AMITRIPTYLINE HYDROCHLORIDE 50 MG/1
TABLET, FILM COATED ORAL
COMMUNITY
Start: 2024-01-29

## 2024-02-28 RX ORDER — QUETIAPINE FUMARATE 300 MG/1
TABLET, FILM COATED ORAL
COMMUNITY
Start: 2024-01-22

## 2024-02-28 RX ORDER — PROPRANOLOL HYDROCHLORIDE 10 MG/1
TABLET ORAL
COMMUNITY
Start: 2024-02-28

## 2024-02-29 ENCOUNTER — DOCUMENTATION (OUTPATIENT)
Dept: PULMONOLOGY | Facility: CLINIC | Age: 54
End: 2024-02-29
Payer: COMMERCIAL

## 2024-02-29 ENCOUNTER — CLINICAL SUPPORT (OUTPATIENT)
Dept: PULMONOLOGY | Facility: CLINIC | Age: 54
End: 2024-02-29
Payer: COMMERCIAL

## 2024-02-29 DIAGNOSIS — J45.901 ASTHMA WITH ACUTE EXACERBATION, UNSPECIFIED ASTHMA SEVERITY, UNSPECIFIED WHETHER PERSISTENT: Primary | ICD-10-CM

## 2024-02-29 NOTE — PROGRESS NOTES
Patient would like to switch to autoinjector pen for home use.     Message sent to the pharmacy requesting this.

## 2024-03-25 ENCOUNTER — SPECIALTY PHARMACY (OUTPATIENT)
Dept: PHARMACY | Facility: HOSPITAL | Age: 54
End: 2024-03-25

## 2024-03-25 NOTE — PROGRESS NOTES
Specialty Pharmacy Refill Coordination Note     Reji is a 53 y.o. male contacted today regarding refills of Tezspire specialty medication(s).    Reviewed and verified with patient: yes  Specialty medication(s) and dose(s) confirmed: yes    Refill Questions      Flowsheet Row Most Recent Value   Changes to allergies? No   Changes to medications? No   New conditions or infections since last clinic visit No   Unplanned office visit, urgent care, ED, or hospital admission in the last 4 weeks  No   How does patient/caregiver feel medication is working? Very good   Financial problems or insurance changes  No   Since the previous refill, were any specialty medication doses or scheduled injections missed or delayed?  No   Does this patient require a clinical escalation to a pharmacist? No                       Follow-up: 28 day(s)     Ema Barrios, Pharmacy Technician  Specialty Pharmacy Technician

## 2024-03-29 ENCOUNTER — TRANSCRIBE ORDERS (OUTPATIENT)
Dept: ADMINISTRATIVE | Facility: HOSPITAL | Age: 54
End: 2024-03-29
Payer: MEDICARE

## 2024-03-29 DIAGNOSIS — M51.16 NEURITIS OR RADICULITIS DUE TO RUPTURE OF LUMBAR INTERVERTEBRAL DISC: Primary | ICD-10-CM

## 2024-03-29 DIAGNOSIS — M54.50 LOW BACK PAIN, UNSPECIFIED BACK PAIN LATERALITY, UNSPECIFIED CHRONICITY, UNSPECIFIED WHETHER SCIATICA PRESENT: ICD-10-CM

## 2024-04-03 ENCOUNTER — SPECIALTY PHARMACY (OUTPATIENT)
Dept: PHARMACY | Facility: HOSPITAL | Age: 54
End: 2024-04-03
Payer: MEDICARE

## 2024-04-03 ENCOUNTER — DISEASE STATE MANAGEMENT VISIT (OUTPATIENT)
Dept: PHARMACY | Facility: HOSPITAL | Age: 54
End: 2024-04-03
Payer: COMMERCIAL

## 2024-04-03 RX ORDER — TEZEPELUMAB-EKKO 210 MG/1.9ML
210 INJECTION, SOLUTION SUBCUTANEOUS
Qty: 1.91 ML | Refills: 5 | Status: SHIPPED | OUTPATIENT
Start: 2024-04-03

## 2024-04-03 NOTE — PROGRESS NOTES
[facility]  Asthma Management       Reji Linares is a 53 y.o. male referred by Pulmonology to the [facility] for severe  asthma.  The patient's current asthma regimen includes: Symbicort, Spiriva, Tezspire and Singulair. Pt reports good adherence to oral medications. Pt reports he is not a smoker or exposed to second hand smoke.     Reji Linares reports asthma kept them from getting as much done some of the time and having shortness of breath once a day.   Pt reports no nighttime awakenings due to asthma symptoms and using a rescue inhaler 1-2 times per day.      Pt self rates asthma control as Well Controlled.     He reports that he can tell a difference with Tezspire on board and denies any side effects from the medication. Patient will be switching to auto-injector today so that he can self-administer at home.       Past Medical History:   Diagnosis Date    Anxiety     Arthritis     Asthma     GERD (gastroesophageal reflux disease)     Headache     Low back pain     Sleep apnea     no c-pap     Social History     Socioeconomic History    Marital status:    Tobacco Use    Smoking status: Never     Passive exposure: Never    Smokeless tobacco: Never   Vaping Use    Vaping status: Never Used   Substance and Sexual Activity    Alcohol use: No    Drug use: No    Sexual activity: Defer     Patient has no known allergies.    Current Outpatient Medications:     albuterol sulfate  (90 Base) MCG/ACT inhaler, Inhale 2 puffs Every 4 (Four) Hours As Needed for Wheezing., Disp: 18 g, Rfl: 8    amitriptyline (ELAVIL) 50 MG tablet, , Disp: , Rfl:     busPIRone (BUSPAR) 30 MG tablet, Take 1 tablet by mouth Daily., Disp: , Rfl:     clonazePAM (KlonoPIN) 0.5 MG tablet, Take 1 tablet by mouth 2 (Two) Times a Day As Needed., Disp: , Rfl:     diclofenac (VOLTAREN) 75 MG EC tablet, Take 1 tablet by mouth 2 (Two) Times a Day As Needed., Disp: , Rfl:     DULoxetine  (CYMBALTA) 60 MG capsule, Take 1 capsule by mouth 2 (Two) Times a Day., Disp: , Rfl:     famotidine (Pepcid) 40 MG tablet, Take 1 tablet by mouth At Night As Needed for Heartburn., Disp: 30 tablet, Rfl: 5    gabapentin (NEURONTIN) 600 MG tablet, Take 1 tablet by mouth 3 (Three) Times a Day., Disp: , Rfl:     HYDROcodone-acetaminophen (NORCO)  MG per tablet, Take 1 tablet by mouth Every 6 (Six) Hours As Needed for Moderate (Pain)., Disp: 16 tablet, Rfl: 0    hydrOXYzine (ATARAX) 50 MG tablet, Take 1 tablet by mouth 2 (Two) Times a Day., Disp: , Rfl:     lansoprazole (PREVACID) 30 MG capsule, Take 1 capsule by mouth 2 (Two) Times a Day. Take one capsule 30 minutes prior to eating breakfast, Disp: 60 capsule, Rfl: 5    methocarbamol (ROBAXIN) 750 MG tablet, Take 1 tablet by mouth 4 (Four) Times a Day As Needed for Muscle Spasms., Disp: , Rfl:     montelukast (SINGULAIR) 10 MG tablet, TAKE 1 TABLET BY MOUTH EVERY NIGHT, Disp: 30 tablet, Rfl: 5    OLANZapine (zyPREXA) 7.5 MG tablet, Take 1 tablet by mouth Every Night., Disp: , Rfl:     propranolol (INDERAL) 10 MG tablet, Take 1 tablet by mouth Daily., Disp: , Rfl:     QUEtiapine (SEROquel) 300 MG tablet, Take 1 tablet by mouth Every Night., Disp: , Rfl:     rOPINIRole (REQUIP) 3 MG tablet, , Disp: , Rfl:     terazosin (HYTRIN) 5 MG capsule, Take 1 capsule by mouth Every Night., Disp: 30 capsule, Rfl: 3    Tezepelumab-ekko (Tezspire) 210 MG/1.91ML solution auto-injector, Inject 1.91 mL under the skin into the appropriate area as directed Every 28 (Twenty-Eight) Days., Disp: 1.91 mL, Rfl: 5    Tiotropium Bromide Monohydrate (Spiriva Respimat) 1.25 MCG/ACT aerosol solution inhaler, Inhale 2 puffs Daily., Disp: 4 g, Rfl: 8        Asthma Control Test Results:   0-15 Very Poorly Controlled Asthma   15-20 Poorly Controlled Asthma  20-25 Well Controlled Asthma     Date  8/26/22 3/30/23 9/12/23 1/8/24 4/3/24    ACT Results 15 11 15 16 16    ACT Results Very  Poorly  Controlled Asthma  Very Poorly  Controlled Asthma  Poorly  Controlled Asthma Poorly Controlled Asthma Poorly Controlled Asthma        Vaccination Status   COVID 19: UTD  Influenza: UTD  Pneumococcal: UTD  Zoster: Needed: UTD    Drug-Drug Interactions: None with Tezspire    Drug-Disease Interactions (non-cardioselective beta blockers, NSAIDs): Propranolol (for anxiety); Diclofenac    Patient Assistance: Not required    Inhaler Technique Observed? No    Treatment Goals: Risk Reduction and Symptom Control     Medication Assessment & Plan:     Patient will continue Tezspire 210 mg SubQ every 4 weeks for severe asthma. Advised Pt to avoid live vaccines while on this medication.     Advised Pt on the importance of continuing maintenance inhaler regimen and the importance of rinsing mouth after ICS use. This injection does not replace your maintenance inhaler and is not used to treat an asthma attack (use a rescue inhaler).     Patient is on a non-selective beta blocker, propranolol, but reports relief from his albuterol inhaler when he does have to use it.  Provider previously informed.    Patient will continue regular follow-up with pulmonology. Next appointment scheduled for 4/19/24.    Patient will be getting medication through [facility] after this fill.    Will follow-up in 6 months, or sooner if needed.     Alice Burnham, PharmD  4/3/2024  14:25 EDT

## 2024-04-03 NOTE — PROGRESS NOTES
[facility]  Asthma Management       Reji Linares is a 53 y.o. male referred by Pulmonology to the [facility] for severe  asthma.  The patient's current asthma regimen includes: Symbicort, Spiriva, Tezspire and Singulair. Pt reports good adherence to oral medications. Pt reports he is not a smoker or exposed to second hand smoke.     Reji Linares reports asthma kept them from getting as much done some of the time and having shortness of breath once a day.   Pt reports no nighttime awakenings due to asthma symptoms and using a rescue inhaler 1-2 times per day.      Pt self rates asthma control as Well Controlled.     He reports that he can tell a difference with Tezspire on board and denies any side effects from the medication. Patient will be switching to auto-injector today so that he can self-administer at home.       Past Medical History:   Diagnosis Date    Anxiety     Arthritis     Asthma     GERD (gastroesophageal reflux disease)     Headache     Low back pain     Sleep apnea     no c-pap     Social History     Socioeconomic History    Marital status:    Tobacco Use    Smoking status: Never     Passive exposure: Never    Smokeless tobacco: Never   Vaping Use    Vaping status: Never Used   Substance and Sexual Activity    Alcohol use: No    Drug use: No    Sexual activity: Defer     Patient has no known allergies.    Current Outpatient Medications:     albuterol sulfate  (90 Base) MCG/ACT inhaler, Inhale 2 puffs Every 4 (Four) Hours As Needed for Wheezing., Disp: 18 g, Rfl: 8    amitriptyline (ELAVIL) 50 MG tablet, , Disp: , Rfl:     benzonatate (TESSALON) 100 MG capsule, Take 1 capsule by mouth 3 (Three) Times a Day As Needed for Cough., Disp: 42 capsule, Rfl: 6    busPIRone (BUSPAR) 30 MG tablet, Take 1 tablet by mouth Daily., Disp: , Rfl:     clonazePAM (KlonoPIN) 0.5 MG tablet, Take 1 tablet by mouth 2 (Two) Times a Day As Needed., Disp: ,  Rfl:     diclofenac (VOLTAREN) 75 MG EC tablet, Take 1 tablet by mouth 2 (Two) Times a Day As Needed., Disp: , Rfl:     DULoxetine (CYMBALTA) 60 MG capsule, Take 1 capsule by mouth 2 (Two) Times a Day., Disp: , Rfl:     famotidine (Pepcid) 40 MG tablet, Take 1 tablet by mouth At Night As Needed for Heartburn., Disp: 30 tablet, Rfl: 5    gabapentin (NEURONTIN) 600 MG tablet, Take 1 tablet by mouth 3 (Three) Times a Day., Disp: , Rfl:     HYDROcodone-acetaminophen (NORCO)  MG per tablet, Take 1 tablet by mouth Every 6 (Six) Hours As Needed for Moderate (Pain)., Disp: 16 tablet, Rfl: 0    hydrOXYzine (ATARAX) 50 MG tablet, Take 1 tablet by mouth 2 (Two) Times a Day., Disp: , Rfl:     lansoprazole (PREVACID) 30 MG capsule, Take 1 capsule by mouth 2 (Two) Times a Day. Take one capsule 30 minutes prior to eating breakfast, Disp: 60 capsule, Rfl: 5    methocarbamol (ROBAXIN) 750 MG tablet, Take 1 tablet by mouth 4 (Four) Times a Day As Needed for Muscle Spasms., Disp: , Rfl:     montelukast (SINGULAIR) 10 MG tablet, TAKE 1 TABLET BY MOUTH EVERY NIGHT, Disp: 30 tablet, Rfl: 5    OLANZapine (zyPREXA) 7.5 MG tablet, Take 1 tablet by mouth Every Night., Disp: , Rfl:     propranolol (INDERAL) 10 MG tablet, , Disp: , Rfl:     propranolol (INDERAL) 20 MG tablet, Take 2 tablets by mouth 2 (Two) Times a Day., Disp: , Rfl:     QUEtiapine (SEROquel) 100 MG tablet, Take 1 tablet by mouth Every Night., Disp: , Rfl:     QUEtiapine (SEROquel) 300 MG tablet, , Disp: , Rfl:     rOPINIRole (REQUIP) 3 MG tablet, , Disp: , Rfl:     rOPINIRole (REQUIP) 4 MG tablet, Take 1 tablet by mouth Every Night., Disp: , Rfl:     terazosin (HYTRIN) 5 MG capsule, Take 1 capsule by mouth Every Night., Disp: 30 capsule, Rfl: 3    Tezepelumab-ekko (Tezspire) 210 MG/1.91ML solution auto-injector, Inject 1.91 mL under the skin into the appropriate area as directed Every 28 (Twenty-Eight) Days., Disp: 1.91 mL, Rfl: 5    Tiotropium Bromide Monohydrate  (Spiriva Respimat) 1.25 MCG/ACT aerosol solution inhaler, Inhale 2 puffs Daily., Disp: 4 g, Rfl: 8        Asthma Control Test Results:   0-15 Very Poorly Controlled Asthma   15-20 Poorly Controlled Asthma  20-25 Well Controlled Asthma     Date  8/26/22 3/30/23 9/12/23 1/8/24 4/3/24    ACT Results 15 11 15 16 16    ACT Results Very Poorly  Controlled Asthma  Very Poorly  Controlled Asthma  Poorly  Controlled Asthma Poorly Controlled Asthma Poorly Controlled Asthma        Vaccination Status   COVID 19: UTD  Influenza: UTD  Pneumococcal: UTD  Zoster: Needed: UTD    Drug-Drug Interactions: None with Tezspire    Drug-Disease Interactions (non-cardioselective beta blockers, NSAIDs): Propranolol (for anxiety); Diclofenac    Patient Assistance: Not required    Inhaler Technique Observed? No    Treatment Goals: Risk Reduction and Symptom Control     Medication Assessment & Plan:     Patient will continue Tezspire 210 mg SubQ every 4 weeks for severe asthma. Advised Pt to avoid live vaccines while on this medication.     Advised Pt on the importance of continuing maintenance inhaler regimen and the importance of rinsing mouth after ICS use. This injection does not replace your maintenance inhaler and is not used to treat an asthma attack (use a rescue inhaler).     Patient is on a non-selective beta blocker, propranolol, but reports relief from his albuterol inhaler when he does have to use it.  Provider previously informed.    Patient will continue regular follow-up with pulmonology. Next appointment scheduled for 4/19/24.    Patient will be getting medication through [facility] after this fill.    Will follow-up in 6 months, or sooner if needed.     Alice Burnham, PharmD  4/3/2024  13:57 EDT

## 2024-04-11 ENCOUNTER — HOSPITAL ENCOUNTER (OUTPATIENT)
Dept: MRI IMAGING | Facility: HOSPITAL | Age: 54
Discharge: HOME | End: 2024-04-11
Payer: MEDICARE

## 2024-04-11 DIAGNOSIS — M51.16 NEURITIS OR RADICULITIS DUE TO RUPTURE OF LUMBAR INTERVERTEBRAL DISC: ICD-10-CM

## 2024-04-11 DIAGNOSIS — M54.50 LOW BACK PAIN, UNSPECIFIED BACK PAIN LATERALITY, UNSPECIFIED CHRONICITY, UNSPECIFIED WHETHER SCIATICA PRESENT: ICD-10-CM

## 2024-04-11 PROCEDURE — 72148 MRI LUMBAR SPINE W/O DYE: CPT

## 2024-05-02 ENCOUNTER — SPECIALTY PHARMACY (OUTPATIENT)
Dept: PHARMACY | Facility: HOSPITAL | Age: 54
End: 2024-05-02
Payer: MEDICARE

## 2024-05-09 ENCOUNTER — OFFICE VISIT (OUTPATIENT)
Dept: PULMONOLOGY | Facility: CLINIC | Age: 54
End: 2024-05-09
Payer: MEDICARE

## 2024-05-09 VITALS
HEIGHT: 70 IN | DIASTOLIC BLOOD PRESSURE: 82 MMHG | WEIGHT: 190 LBS | HEART RATE: 89 BPM | BODY MASS INDEX: 27.2 KG/M2 | OXYGEN SATURATION: 98 % | TEMPERATURE: 97.5 F | SYSTOLIC BLOOD PRESSURE: 122 MMHG

## 2024-05-09 DIAGNOSIS — J45.50 SEVERE PERSISTENT ASTHMA WITHOUT COMPLICATION: Primary | ICD-10-CM

## 2024-05-09 DIAGNOSIS — G47.33 OBSTRUCTIVE SLEEP APNEA: ICD-10-CM

## 2024-05-09 PROCEDURE — 99214 OFFICE O/P EST MOD 30 MIN: CPT | Performed by: PHYSICIAN ASSISTANT

## 2024-05-09 PROCEDURE — 1160F RVW MEDS BY RX/DR IN RCRD: CPT | Performed by: PHYSICIAN ASSISTANT

## 2024-05-09 PROCEDURE — 1159F MED LIST DOCD IN RCRD: CPT | Performed by: PHYSICIAN ASSISTANT

## 2024-05-11 RX ORDER — BUDESONIDE AND FORMOTEROL FUMARATE DIHYDRATE 160; 4.5 UG/1; UG/1
2 AEROSOL RESPIRATORY (INHALATION)
COMMUNITY

## 2024-05-28 ENCOUNTER — TRANSCRIBE ORDERS (OUTPATIENT)
Dept: ADMINISTRATIVE | Facility: HOSPITAL | Age: 54
End: 2024-05-28
Payer: MEDICARE

## 2024-05-28 DIAGNOSIS — R41.82 ALTERED MENTAL STATUS, UNSPECIFIED ALTERED MENTAL STATUS TYPE: Primary | ICD-10-CM

## 2024-05-28 DIAGNOSIS — R42 DIZZINESS: ICD-10-CM

## 2024-05-28 DIAGNOSIS — J45.50 SEVERE PERSISTENT ASTHMA WITHOUT COMPLICATION: ICD-10-CM

## 2024-05-28 RX ORDER — MONTELUKAST SODIUM 10 MG/1
10 TABLET ORAL NIGHTLY
Qty: 30 TABLET | Refills: 5 | Status: SHIPPED | OUTPATIENT
Start: 2024-05-28

## 2024-05-29 ENCOUNTER — SPECIALTY PHARMACY (OUTPATIENT)
Dept: PHARMACY | Facility: HOSPITAL | Age: 54
End: 2024-05-29
Payer: MEDICARE

## 2024-05-29 NOTE — PROGRESS NOTES
Specialty Pharmacy Refill Coordination Note     Reji is a 53 y.o. male contacted today regarding refills of  Tezspire specialty medication(s).    Reviewed and verified with patient:       Specialty medication(s) and dose(s) confirmed: yes    Refill Questions      Flowsheet Row Most Recent Value   Changes to allergies? No   Changes to medications? No   New conditions or infections since last clinic visit No   Unplanned office visit, urgent care, ED, or hospital admission in the last 4 weeks  No   How does patient/caregiver feel medication is working? Very good   Financial problems or insurance changes  No   Since the previous refill, were any specialty medication doses or scheduled injections missed or delayed?  No   Why were doses missed? na   Does this patient require a clinical escalation to a pharmacist? No            Delivery Questions      Flowsheet Row Most Recent Value   Copay verified? No   Copay amount na   Copay form of payment No copayment ($0)                   Follow-up: 30 day(s)     Corinna Valdes Pharmacy Technician  Specialty Pharmacy Technician

## 2024-06-17 ENCOUNTER — HOSPITAL ENCOUNTER (OUTPATIENT)
Dept: MRI IMAGING | Facility: HOSPITAL | Age: 54
Discharge: HOME | End: 2024-06-17
Payer: MEDICARE

## 2024-06-17 DIAGNOSIS — R42 DIZZINESS: ICD-10-CM

## 2024-06-17 DIAGNOSIS — R41.82 ALTERED MENTAL STATUS, UNSPECIFIED ALTERED MENTAL STATUS TYPE: ICD-10-CM

## 2024-06-17 PROCEDURE — 70546 MR ANGIOGRAPH HEAD W/O&W/DYE: CPT | Performed by: RADIOLOGY

## 2024-06-17 PROCEDURE — A9577 INJ MULTIHANCE: HCPCS | Performed by: NURSE PRACTITIONER

## 2024-06-17 PROCEDURE — 70553 MRI BRAIN STEM W/O & W/DYE: CPT

## 2024-06-17 PROCEDURE — 0 GADOBENATE DIMEGLUMINE 529 MG/ML SOLUTION: Performed by: NURSE PRACTITIONER

## 2024-06-17 PROCEDURE — 70553 MRI BRAIN STEM W/O & W/DYE: CPT | Performed by: RADIOLOGY

## 2024-06-17 PROCEDURE — 70546 MR ANGIOGRAPH HEAD W/O&W/DYE: CPT

## 2024-06-17 RX ADMIN — GADOBENATE DIMEGLUMINE 20 ML: 529 INJECTION, SOLUTION INTRAVENOUS at 12:48

## 2024-06-20 ENCOUNTER — SPECIALTY PHARMACY (OUTPATIENT)
Dept: PHARMACY | Facility: HOSPITAL | Age: 54
End: 2024-06-20
Payer: MEDICARE

## 2024-06-20 NOTE — PROGRESS NOTES
Specialty Pharmacy Refill Coordination Note     Reji is a 53 y.o. male contacted today regarding refills of Tezspire specialty medication(s).    Reviewed and verified with patient: yes  Specialty medication(s) and dose(s) confirmed: yes    Refill Questions      Flowsheet Row Most Recent Value   Changes to allergies? No   Changes to medications? No   New conditions or infections since last clinic visit No   Unplanned office visit, urgent care, ED, or hospital admission in the last 4 weeks  No   How does patient/caregiver feel medication is working? Very good   Financial problems or insurance changes  No   Since the previous refill, were any specialty medication doses or scheduled injections missed or delayed?  No   Does this patient require a clinical escalation to a pharmacist? No            Delivery Questions      Flowsheet Row Most Recent Value   Copay verified? No   Copay amount $0   Copay form of payment No copayment ($0)                   Follow-up: 28 day(s)     Ema Barrios, Pharmacy Technician  Specialty Pharmacy Technician

## 2024-07-16 ENCOUNTER — SPECIALTY PHARMACY (OUTPATIENT)
Dept: PHARMACY | Facility: HOSPITAL | Age: 54
End: 2024-07-16
Payer: MEDICARE

## 2024-07-16 NOTE — PROGRESS NOTES
Specialty Pharmacy Refill Coordination Note     Reji is a 53 y.o. male contacted today regarding refills of Tezspire specialty medication(s).    Reviewed and verified with patient: yes  Specialty medication(s) and dose(s) confirmed: yes    Refill Questions      Flowsheet Row Most Recent Value   Changes to allergies? No   Changes to medications? No   New conditions or infections since last clinic visit No   Unplanned office visit, urgent care, ED, or hospital admission in the last 4 weeks  No   How does patient/caregiver feel medication is working? Very good   Financial problems or insurance changes  No   Since the previous refill, were any specialty medication doses or scheduled injections missed or delayed?  No   Does this patient require a clinical escalation to a pharmacist? No            Delivery Questions      Flowsheet Row Most Recent Value   Copay verified? Yes   Copay amount $0   Copay form of payment No copayment ($0)                   Follow-up: 28 day(s)     Ema Barrios, Pharmacy Technician  Specialty Pharmacy Technician

## 2024-08-09 ENCOUNTER — SPECIALTY PHARMACY (OUTPATIENT)
Dept: PHARMACY | Facility: HOSPITAL | Age: 54
End: 2024-08-09
Payer: MEDICARE

## 2024-08-27 ENCOUNTER — SPECIALTY PHARMACY (OUTPATIENT)
Dept: PHARMACY | Facility: HOSPITAL | Age: 54
End: 2024-08-27
Payer: MEDICARE

## 2024-08-27 RX ORDER — TEZEPELUMAB-EKKO 210 MG/1.9ML
210 INJECTION, SOLUTION SUBCUTANEOUS
Qty: 1.91 ML | Refills: 5 | Status: SHIPPED | OUTPATIENT
Start: 2024-08-27

## 2024-08-27 NOTE — PROGRESS NOTES
[facility]  Asthma Management     Reji Linares is a 53 y.o. male referred by their provider, Marielle Kunz,  [facility] for Asthma on 3/29/22.  A follow-up outreach was conducted, including assessment of therapy appropriateness and specialty medication education for Tezspire. The patient was introduced to services offered by [facility], including: regular assessments, refill coordination, curbside pick-up or mail order delivery options, prior authorization maintenance, and financial assistance programs as applicable. The patient was also provided with contact information for the pharmacy team.     The patient's current asthma regimen includes: Symbicort, Spiriva, Tezspire and Singulair. Pt reports good adherence to oral medications. Pt reports he is not a smoker or exposed to second hand smoke.      Insurance Coverage & Financial Support  No changes to pharmacy coverage     Relevant Past Medical History and Comorbidities  Relevant medical history and concomitant health conditions were discussed with the patient. The patient's chart has been reviewed for relevant past medical history and comorbid conditions and updated as necessary.  Past Medical History:   Diagnosis Date    Anxiety     Arthritis     Asthma     GERD (gastroesophageal reflux disease)     Headache     Low back pain     Sleep apnea     no c-pap     Social History     Socioeconomic History    Marital status:    Tobacco Use    Smoking status: Never     Passive exposure: Never    Smokeless tobacco: Never   Vaping Use    Vaping status: Never Used   Substance and Sexual Activity    Alcohol use: No    Drug use: No    Sexual activity: Defer       Problem list reviewed by Kimberli Licea, PharmD on 8/27/2024 at  3:56 PM    Allergies  Known allergies and reactions were discussed with the patient. The patient's chart has been reviewed for  allergy  information and updated as necessary.   No Known Allergies    Allergies reviewed by Kimberli Licea, PharmD on 8/27/2024 at  3:49 PM    Relevant Laboratory Values  Relevant laboratory values were discussed with the patient.   Lab Results   Component Value Date    GLUCOSE 154 (H) 09/27/2023    CALCIUM 9.5 09/27/2023     (L) 09/27/2023    K 4.4 09/27/2023    CO2 22.1 09/27/2023     09/27/2023    BUN 22 (H) 09/27/2023    CREATININE 1.37 (H) 09/27/2023    BCR 16.1 09/27/2023    ANIONGAP 10.9 09/27/2023     Lab Results   Component Value Date    TRIG 133 08/09/2023       Current Medication List  This medication list has been reviewed with the patient and evaluated for any interactions or necessary modifications/recommendations, and updated to include all prescription medications, OTC medications, and supplements the patient is currently taking.  This list reflects what is contained in the patient's profile, which has also been marked as reviewed to communicate to other providers it is the most up to date version of the patient's current medication therapy.     Current Outpatient Medications:     albuterol sulfate  (90 Base) MCG/ACT inhaler, Inhale 2 puffs Every 4 (Four) Hours As Needed for Wheezing., Disp: 18 g, Rfl: 8    amitriptyline (ELAVIL) 50 MG tablet, , Disp: , Rfl:     budesonide-formoterol (SYMBICORT) 160-4.5 MCG/ACT inhaler, Inhale 2 puffs 2 (Two) Times a Day., Disp: , Rfl:     busPIRone (BUSPAR) 30 MG tablet, Take 1 tablet by mouth Daily., Disp: , Rfl:     clonazePAM (KlonoPIN) 0.5 MG tablet, Take 1 tablet by mouth 2 (Two) Times a Day As Needed., Disp: , Rfl:     diclofenac (VOLTAREN) 75 MG EC tablet, Take 1 tablet by mouth 2 (Two) Times a Day As Needed., Disp: , Rfl:     DULoxetine (CYMBALTA) 60 MG capsule, Take 1 capsule by mouth 2 (Two) Times a Day., Disp: , Rfl:     famotidine (Pepcid) 40 MG tablet, Take 1 tablet by mouth At Night As Needed for Heartburn., Disp: 30 tablet, Rfl: 5     gabapentin (NEURONTIN) 600 MG tablet, Take 1 tablet by mouth 3 (Three) Times a Day., Disp: , Rfl:     HYDROcodone-acetaminophen (NORCO)  MG per tablet, Take 1 tablet by mouth Every 6 (Six) Hours As Needed for Moderate (Pain). (Patient taking differently: Take 1 tablet by mouth Every 12 (Twelve) Hours As Needed for Moderate Pain (Pain).), Disp: 16 tablet, Rfl: 0    hydrOXYzine (ATARAX) 50 MG tablet, Take 1 tablet by mouth 2 (Two) Times a Day., Disp: , Rfl:     lansoprazole (PREVACID) 30 MG capsule, Take 1 capsule by mouth 2 (Two) Times a Day. Take one capsule 30 minutes prior to eating breakfast, Disp: 60 capsule, Rfl: 5    methocarbamol (ROBAXIN) 750 MG tablet, Take 1 tablet by mouth every night at bedtime., Disp: , Rfl:     montelukast (SINGULAIR) 10 MG tablet, TAKE 1 TABLET BY MOUTH EVERY NIGHT, Disp: 30 tablet, Rfl: 5    OLANZapine (zyPREXA) 7.5 MG tablet, Take 1 tablet by mouth Every Night., Disp: , Rfl:     propranolol (INDERAL) 10 MG tablet, Take 1 tablet by mouth Daily., Disp: , Rfl:     QUEtiapine (SEROquel) 300 MG tablet, Take 1 tablet by mouth Every Night., Disp: , Rfl:     rOPINIRole (REQUIP) 3 MG tablet, , Disp: , Rfl:     terazosin (HYTRIN) 5 MG capsule, Take 1 capsule by mouth Every Night., Disp: 30 capsule, Rfl: 3    Tezepelumab-ekko (Tezspire) 210 MG/1.91ML solution auto-injector, Inject 1.91 mL under the skin into the appropriate area as directed Every 28 (Twenty-Eight) Days., Disp: 1.91 mL, Rfl: 5    Tiotropium Bromide Monohydrate (Spiriva Respimat) 1.25 MCG/ACT aerosol solution inhaler, Inhale 2 puffs Daily., Disp: 4 g, Rfl: 8    Medicines reviewed by Kimberli Licea, PharmD on 8/27/2024 at  3:52 PM    Drug Interactions  None with Tezspire    Asthma Control Test Results  Reji Linares reports asthma kept them from getting as much done some of the time and having shortness of breath once a day.   Pt denies nighttime awakening due to asthma symptoms and using a rescue inhaler once or  twice per day.      Pt self rates asthma control as well controlled.       Date  8/26/22 3/30/23 9/12/23 1/8/24 4/3/24 8/27/24   ACT Results 15 11 15 16 16 16   ACT Results Very Poorly  Controlled Asthma  Very Poorly  Controlled Asthma  Poorly  Controlled Asthma Poorly  Controlled Asthma Poorly  Controlled Asthma Poorly  Controlled Asthma   0-15 Very Poorly Controlled Asthma   15-20 Poorly Controlled Asthma  20-25 Well Controlled Asthma     Vaccination Status   (LIVE vaccines not recommend with Tezspire and Dupixent)  COVID: UTD  Influenza: UTD  Pneumococcal: UTD  Zoster: UTD    Drug-Disease Interactions(non-cardioselective beta blockers)  Propranolol (for anxiety); Diclofenac     Inhaler Technique Observed? No   If yes, notes:     Goals of Therapy  Goals related to the patient's specialty therapy were discussed with the patient. The Patient Goals segment of this outreach has been reviewed and updated.   Goals Addressed Today        Specialty Pharmacy General Goal      To reduce asthma symptoms             Adherence, Self-Administration, and Current Therapy Problems  Adherence related to the patient's specialty therapy was discussed with the patient. The Adherence segment of this outreach has been reviewed and updated.   Adherence Questions  Linked Medication(s) Assessed: Tezepelumab-Ekko  On average, how many doses/injections does the patient miss per month?: 0  What are the identified reasons for non-adherence or missed doses? : no problems identified  What is the estimated medication adherence level?: %  Based on the patient/caregiver response and refill history, does this patient require an MTP to track adherence improvements?: no  Additional Barriers to Patient Self-Administration: Addressed in Plan, if applicable  Methods for Supporting Patient Self-Administration: Addressed in Plan, if applicable  Open Medication Therapy Problems  No medication therapy recommendations to display  Adverse Drug  Reactions  Medication tolerability: Tolerating with no to minimal ADRs  Medication plan: Continue therapy with normal follow-up  Plan for ADR Management: Addressed in Plan, if applicable    Adherence, Self-Administration, and Current Therapy Problems  Adherence related to the patient's specialty therapy was discussed with the patient. The Adherence segment of this outreach has been reviewed and updated.     Adherence Questions  Linked Medication(s) Assessed: Tezepelumab-Ekko  On average, how many doses/injections does the patient miss per month?: 0  What are the identified reasons for non-adherence or missed doses? : no problems identified  What is the estimated medication adherence level?: %  Based on the patient/caregiver response and refill history, does this patient require an MTP to track adherence improvements?: no    Additional Barriers to Patient Self-Administration: Addressed in Plan, if applicable  Methods for Supporting Patient Self-Administration: Addressed in Plan, if applicable    Open Medication Therapy Problems  No medication therapy recommendations to display    Quality of Life Assessment   Quality of Life related to the patient's enrollment in the patient management program and services provided was discussed with the patient. The QOL segment of this outreach has been reviewed and updated.  Quality of Life Improvement Scale: 8-Moderately better  Assessment Plan & Follow-Up  Patient will continue Tezspire 210 mg every 28 days.   Patient is comfortable giving their injections at home.  Related Plans, Therapy Recommendations, or Therapy Problems to Be Addressed: none  Recommended vaccinations- none  Patient will continue regular follow-up with pulmonology.   Patient will follow-up with specialty mail out services.  Care Coordinator to set up future refill outreaches, coordinate prescription delivery, and escalate clinical questions to pharmacist.  Will follow-up in 6 months, or sooner if needed.  Pharmacist to perform regular assessments no more than (6) months from the previous assessment.   Counseled Pt on the importance of smoking cessation & offered referral to [facility] cessation program.  Recommended discussing an asthma action plan with provider  Welcome information and patient satisfaction survey to be sent by specialty pharmacy team with patient's initial fill.    Attestation  Therapeutic appropriateness: Appropriate   I attest the patient was actively involved in and has agreed to the above plan of care. If the prescribed therapy is at any point deemed not appropriate based on the current or future assessments, a consultation will be initiated with the patient's specialty care provider to determine the best course of action. The revised plan of therapy will be documented along with any required assessments and/or additional patient education provided.     Kimberli Licea, PharmD  8/27/2024  15:59 EDT

## 2024-09-04 ENCOUNTER — SPECIALTY PHARMACY (OUTPATIENT)
Dept: PHARMACY | Facility: HOSPITAL | Age: 54
End: 2024-09-04
Payer: MEDICARE

## 2024-09-04 NOTE — PROGRESS NOTES
Specialty Pharmacy Refill Coordination Note     Reji is a 53 y.o. male contacted today regarding refills of Tezspire specialty medication(s).    Reviewed and verified with patient: yes  Specialty medication(s) and dose(s) confirmed: yes    Refill Questions      Flowsheet Row Most Recent Value   Changes to allergies? No   Changes to medications? No   New conditions or infections since last clinic visit No   Unplanned office visit, urgent care, ED, or hospital admission in the last 4 weeks  No   How does patient/caregiver feel medication is working? Very good   Financial problems or insurance changes  No   Since the previous refill, were any specialty medication doses or scheduled injections missed or delayed?  No   Does this patient require a clinical escalation to a pharmacist? No            Delivery Questions      Flowsheet Row Most Recent Value   Delivery method [employer]   Delivery address verified with patient/caregiver? Yes   Number of medications in delivery 1   Medication(s) being filled and delivered Tezepelumab-Ekko   Doses left of specialty medications N/A   Copay verified? Yes   Copay amount $0   Copay form of payment No copayment ($0)   Ship Date N/A   Delivery Date N/A                   Follow-up: 28 day(s)     Ema Barrios, Pharmacy Technician  Specialty Pharmacy Technician

## 2024-09-05 DIAGNOSIS — K21.9 GASTROESOPHAGEAL REFLUX DISEASE WITHOUT ESOPHAGITIS: ICD-10-CM

## 2024-09-05 RX ORDER — FAMOTIDINE 40 MG/1
TABLET, FILM COATED ORAL
Qty: 30 TABLET | Refills: 5 | Status: SHIPPED | OUTPATIENT
Start: 2024-09-05

## 2024-09-18 ENCOUNTER — OFFICE VISIT (OUTPATIENT)
Dept: GASTROENTEROLOGY | Facility: CLINIC | Age: 54
End: 2024-09-18
Payer: MEDICARE

## 2024-09-18 VITALS
BODY MASS INDEX: 26.2 KG/M2 | HEART RATE: 86 BPM | HEIGHT: 70 IN | SYSTOLIC BLOOD PRESSURE: 121 MMHG | DIASTOLIC BLOOD PRESSURE: 81 MMHG | WEIGHT: 183 LBS

## 2024-09-18 DIAGNOSIS — K21.9 GASTROESOPHAGEAL REFLUX DISEASE WITHOUT ESOPHAGITIS: ICD-10-CM

## 2024-09-18 DIAGNOSIS — K76.0 FATTY LIVER: Primary | ICD-10-CM

## 2024-09-18 PROCEDURE — 1160F RVW MEDS BY RX/DR IN RCRD: CPT | Performed by: NURSE PRACTITIONER

## 2024-09-18 PROCEDURE — 1159F MED LIST DOCD IN RCRD: CPT | Performed by: NURSE PRACTITIONER

## 2024-09-18 PROCEDURE — 99214 OFFICE O/P EST MOD 30 MIN: CPT | Performed by: NURSE PRACTITIONER

## 2024-09-18 RX ORDER — LANSOPRAZOLE 30 MG/1
30 CAPSULE, DELAYED RELEASE ORAL 2 TIMES DAILY
Qty: 60 CAPSULE | Refills: 5 | Status: SHIPPED | OUTPATIENT
Start: 2024-09-18

## 2024-09-18 RX ORDER — FAMOTIDINE 40 MG/1
40 TABLET, FILM COATED ORAL DAILY PRN
Qty: 30 TABLET | Refills: 5 | Status: SHIPPED | OUTPATIENT
Start: 2024-09-18

## 2024-09-26 ENCOUNTER — SPECIALTY PHARMACY (OUTPATIENT)
Dept: PHARMACY | Facility: HOSPITAL | Age: 54
End: 2024-09-26
Payer: MEDICARE

## 2024-09-26 NOTE — PROGRESS NOTES
Specialty Pharmacy Refill Coordination Note     Reji is a 53 y.o. male contacted today regarding refills of Tezspire specialty medication(s).    Reviewed and verified with patient: yes  Specialty medication(s) and dose(s) confirmed: yes    Refill Questions      Flowsheet Row Most Recent Value   Changes to allergies? No   Changes to medications? No   New conditions or infections since last clinic visit No   Unplanned office visit, urgent care, ED, or hospital admission in the last 4 weeks  No   How does patient/caregiver feel medication is working? Very good   Financial problems or insurance changes  No   Since the previous refill, were any specialty medication doses or scheduled injections missed or delayed?  No   Does this patient require a clinical escalation to a pharmacist? No            Delivery Questions      Flowsheet Row Most Recent Value   Delivery method [employer]   Delivery address verified with patient/caregiver? Yes   Delivery address Home   Number of medications in delivery 1   Medication(s) being filled and delivered Tezepelumab-Ekko   Doses left of specialty medications N/A   Copay verified? Yes   Copay amount $0   Copay form of payment No copayment ($0)   Ship Date N/A   Delivery Date N/A                   Follow-up: 84 day(s)     Ema Barrios, Pharmacy Technician  Specialty Pharmacy Technician

## 2024-10-15 ENCOUNTER — HOSPITAL ENCOUNTER (OUTPATIENT)
Facility: HOSPITAL | Age: 54
Discharge: HOME | End: 2024-10-15
Admitting: NURSE PRACTITIONER
Payer: MEDICARE

## 2024-10-15 DIAGNOSIS — K76.0 FATTY LIVER: ICD-10-CM

## 2024-10-15 PROCEDURE — 76705 ECHO EXAM OF ABDOMEN: CPT

## 2024-10-16 ENCOUNTER — TELEPHONE (OUTPATIENT)
Dept: GASTROENTEROLOGY | Facility: CLINIC | Age: 54
End: 2024-10-16
Payer: MEDICARE

## 2024-10-16 NOTE — TELEPHONE ENCOUNTER
Please let patient know that repeat ultrasound of the liver from 10/15/24 showed mild fatty infiltration of the liver.  Otherwise unremarkable.

## 2024-10-16 NOTE — TELEPHONE ENCOUNTER
I spoke to patient, notified him that per Anna, Please let patient know that repeat ultrasound of the liver from 10/15/24 showed mild fatty infiltration of the liver.  Otherwise unremarkable. Patient verbalized understanding.

## 2024-11-25 ENCOUNTER — OFFICE VISIT (OUTPATIENT)
Dept: PULMONOLOGY | Facility: CLINIC | Age: 54
End: 2024-11-25
Payer: MEDICARE

## 2024-11-25 VITALS
BODY MASS INDEX: 26.48 KG/M2 | TEMPERATURE: 96.9 F | WEIGHT: 185 LBS | OXYGEN SATURATION: 97 % | SYSTOLIC BLOOD PRESSURE: 138 MMHG | DIASTOLIC BLOOD PRESSURE: 80 MMHG | HEIGHT: 70 IN | HEART RATE: 91 BPM

## 2024-11-25 DIAGNOSIS — J45.50 SEVERE PERSISTENT ASTHMA WITHOUT COMPLICATION: ICD-10-CM

## 2024-11-25 DIAGNOSIS — G47.33 OBSTRUCTIVE SLEEP APNEA: Primary | ICD-10-CM

## 2024-11-25 PROCEDURE — 99214 OFFICE O/P EST MOD 30 MIN: CPT | Performed by: PHYSICIAN ASSISTANT

## 2024-11-25 PROCEDURE — 1160F RVW MEDS BY RX/DR IN RCRD: CPT | Performed by: PHYSICIAN ASSISTANT

## 2024-11-25 PROCEDURE — 1159F MED LIST DOCD IN RCRD: CPT | Performed by: PHYSICIAN ASSISTANT

## 2024-11-25 RX ORDER — TIOTROPIUM BROMIDE INHALATION SPRAY 1.56 UG/1
2 SPRAY, METERED RESPIRATORY (INHALATION)
Qty: 4 G | Refills: 11 | Status: SHIPPED | OUTPATIENT
Start: 2024-11-25

## 2024-11-25 RX ORDER — CELECOXIB 200 MG/1
1 CAPSULE ORAL EVERY 24 HOURS
COMMUNITY

## 2024-11-25 RX ORDER — BUDESONIDE AND FORMOTEROL FUMARATE DIHYDRATE 160; 4.5 UG/1; UG/1
2 AEROSOL RESPIRATORY (INHALATION)
Qty: 10.2 G | Refills: 11 | Status: SHIPPED | OUTPATIENT
Start: 2024-11-25

## 2024-11-25 RX ORDER — CEPHALEXIN 500 MG/1
CAPSULE ORAL
COMMUNITY

## 2024-11-25 RX ORDER — ALBUTEROL SULFATE 90 UG/1
2 INHALANT RESPIRATORY (INHALATION) EVERY 4 HOURS PRN
Qty: 18 G | Refills: 11 | Status: SHIPPED | OUTPATIENT
Start: 2024-11-25

## 2024-11-25 NOTE — PROGRESS NOTES
"Chief Complaint  Severe persistent asthma without complication    Subjective        Reji Linares presents to [facility]  History of Present Illness    Mr. Linares presents today for follow-up evaluation.  States that asthma is still well managed at this time with use of Symbicort, Singulair, and albuterol inhaler as needed.  Continues with AutoPap device without any acute complaints.  Discussed that he has been notable for some recent short-term memory loss.  Evaluated current medications.  Will be seeing neurology soon.        Objective   Vital Signs:  /80   Pulse 91   Temp 96.9 °F (36.1 °C)   Ht 177.8 cm (70\")   Wt 83.9 kg (185 lb)   SpO2 97%   BMI 26.54 kg/m²   Estimated body mass index is 26.54 kg/m² as calculated from the following:    Height as of this encounter: 177.8 cm (70\").    Weight as of this encounter: 83.9 kg (185 lb).        Physical Exam  Vitals reviewed.   Constitutional:       General: He is not in acute distress.     Appearance: He is not diaphoretic.   HENT:      Head: Normocephalic and atraumatic.   Cardiovascular:      Rate and Rhythm: Normal rate and regular rhythm.   Pulmonary:      Effort: Pulmonary effort is normal.      Breath sounds: No wheezing, rhonchi or rales.   Neurological:      Mental Status: He is alert and oriented to person, place, and time.   Psychiatric:         Behavior: Behavior normal.            Result Review :  The following data was reviewed by: Marielle Kunz PA-C on 11/25/2024:      PFT 2020    -----------------------------------------------------------------------------------    CT chest high-resolution report 2019    -----------------------------------------------------------------------------------        Assessment and Plan   Diagnoses and all orders for this visit:    1. Obstructive sleep apnea (Primary)    2. Severe persistent asthma without complication  -     albuterol sulfate  (90 " Base) MCG/ACT inhaler; Inhale 2 puffs Every 4 (Four) Hours As Needed for Wheezing or Shortness of Air.  Dispense: 18 g; Refill: 11  -     budesonide-formoterol (SYMBICORT) 160-4.5 MCG/ACT inhaler; Inhale 2 puffs 2 (Two) Times a Day.  Dispense: 10.2 g; Refill: 11  -     Tiotropium Bromide Monohydrate (Spiriva Respimat) 1.25 MCG/ACT aerosol solution inhaler; Inhale 2 puffs Daily.  Dispense: 4 g; Refill: 11          Severe asthma:  Stable on current therapy.  Continue albuterol inhaler as needed  Continue Symbicort 160 mcg 2 puffs twice daily  Continue Spiriva Respimat 1.25 mcg 2 puffs daily  Can titrate down inhaler use as tolerated per symptom maintenance.   Continue singular 10 mg nightly  Continue Tezspire monthly      Discussed recent issue with memory loss. Current medications reviewed.   Can be a possible side effect from Singulair -- can pause use and evaluate for changes whenever ready. If breathing symptoms worsen, can resume use.         Obstructive sleep apnea;   Remains compliant with autopap therapy. No current complications with use.     Management obstructive sleep apnea also includes lifestyle modifications including weight loss, avoidance of alcohol, sedated, caffeine especially before bedtime, allowing adequate sleep time, and body position during sleep such as side versus back. 10% weight loss can result in a 50% improvement of the apnea-hypopnea index.  Untreated sleep apnea can lead to cardiovascular/metabolic disorder, neurocognitive deficit, daytime sleepiness, motor vehicle accidents, depression, mood disorders and reduced quality of life.   Recommended at least 4 hours of use per night for 70% of every month (approximately 21 out of 30 days) per CMS guidelines.         Follow Up   Return in about 1 year (around 11/25/2025), or if symptoms worsen or fail to improve, for Next scheduled follow up.  Patient was given instructions and counseling regarding his condition or for health maintenance  advice. Please see specific information pulled into the AVS if appropriate.

## 2024-12-16 ENCOUNTER — SPECIALTY PHARMACY (OUTPATIENT)
Dept: PHARMACY | Facility: HOSPITAL | Age: 54
End: 2024-12-16
Payer: MEDICARE

## 2024-12-16 RX ORDER — TEZEPELUMAB-EKKO 210 MG/1.9ML
210 INJECTION, SOLUTION SUBCUTANEOUS
Qty: 1.91 ML | Refills: 5 | Status: SHIPPED | OUTPATIENT
Start: 2024-12-16

## 2024-12-16 NOTE — PROGRESS NOTES
Specialty Pharmacy Patient Management Program  Asthma Initial Assessment     Reji Linares was referred by Marilele Kunz to the Patient Management program offered by [facility] for Asthma Management.  An initial outreach was conducted, including assessment of therapy appropriateness and specialty medication education for Tezspire. The patient was introduced to services offered by [facility], including: regular assessments, refill coordination, curbside pick-up or mail order delivery options, prior authorization maintenance, and financial assistance programs as applicable. The patient was also provided with contact information for the pharmacy team.     The patient's current asthma regimen includes: Tezspire, Symbicort, Spiriva, and PRN albuterol. Pt reports good adherence to maintenance regimen. Pt reports he is not a smoker or exposed to second hand smoke. Pt does not have an asthma action plan. Reji Linares reports asthma kept them from getting as much done some of the time and having shortness of breath once a day.   Pt reports nighttime awakening not at all due to asthma symptoms and using a rescue inhaler 1-2 times per day.  Pt self rates asthma control as Well controlled.         Patient reports tolerating Tezspire well without any issues. He denies any side effects, missed doses, or difficulty administering medication.    Insurance Coverage & Financial Support  [facility]     Relevant Past Medical History and Comorbidities  Relevant medical history and concomitant health conditions were discussed with the patient. The patient's chart has been reviewed for relevant past medical history and comorbid conditions and updated as necessary.  Past Medical History:   Diagnosis Date    Anxiety     Arthritis     Asthma     GERD (gastroesophageal reflux disease)     Headache     Low back pain     Sleep apnea     no c-pap     Social  History     Socioeconomic History    Marital status:    Tobacco Use    Smoking status: Never     Passive exposure: Never    Smokeless tobacco: Never   Vaping Use    Vaping status: Never Used   Substance and Sexual Activity    Alcohol use: No    Drug use: No    Sexual activity: Defer       Problem list reviewed by Alice Burnham PharmD on 12/16/2024 at  3:04 PM    Allergies  Known allergies and reactions were discussed with the patient. The patient's chart has been reviewed for  allergy information and updated as necessary.   No Known Allergies    Allergies reviewed by Alice Burnham PharmD on 12/16/2024 at  3:02 PM    Relevant Laboratory Values  Relevant laboratory values were discussed with the patient.   Lab Results   Component Value Date    GLUCOSE 154 (H) 09/27/2023    CALCIUM 9.5 09/27/2023     (L) 09/27/2023    K 4.4 09/27/2023    CO2 22.1 09/27/2023     09/27/2023    BUN 22 (H) 09/27/2023    CREATININE 1.37 (H) 09/27/2023    BCR 16.1 09/27/2023    ANIONGAP 10.9 09/27/2023       Asthma Control Test Results:   0-15 Very Poorly Controlled Asthma   15-20 Poorly Controlled Asthma  20-25 Well Controlled Asthma     Date  8/26/22 3/30/23 9/12/23 1/8/24 4/3/24 8/27/24 12/16/24   ACT Results 15 11 15 16 16 16 16   ACT Results Very Poorly  Controlled Asthma  Very Poorly  Controlled Asthma  Poorly  Controlled Asthma  Poorly  Controlled Asthma  Poorly  Controlled Asthma  Poorly  Controlled Asthma  Poorly  Controlled Asthma      Vaccination Status   (LIVE vaccines not recommend with Tezspire and Dupixent)  Influenza: UTD  Pneumococcal: UTD  Zoster: UTD    Current Medication List  This medication list has been reviewed with the patient and evaluated for any interactions or necessary modifications/recommendations, and updated to include all prescription medications, OTC medications, and supplements the patient is currently taking.  This list reflects what is contained in the patient's profile, which has  also been marked as reviewed to communicate to other providers it is the most up to date version of the patient's current medication therapy.     Current Outpatient Medications:     albuterol sulfate  (90 Base) MCG/ACT inhaler, Inhale 2 puffs Every 4 (Four) Hours As Needed for Wheezing or Shortness of Air., Disp: 18 g, Rfl: 11    amitriptyline (ELAVIL) 50 MG tablet, , Disp: , Rfl:     budesonide-formoterol (SYMBICORT) 160-4.5 MCG/ACT inhaler, Inhale 2 puffs 2 (Two) Times a Day., Disp: 10.2 g, Rfl: 11    celecoxib (CeleBREX) 200 MG capsule, 1 capsule Daily., Disp: , Rfl:     cephalexin (KEFLEX) 500 MG capsule, , Disp: , Rfl:     diclofenac (VOLTAREN) 75 MG EC tablet, Take 1 tablet by mouth 2 (Two) Times a Day As Needed., Disp: , Rfl:     DULoxetine (CYMBALTA) 60 MG capsule, Take 1 capsule by mouth 2 (Two) Times a Day., Disp: , Rfl:     famotidine (PEPCID) 40 MG tablet, Take 1 tablet by mouth Daily As Needed for Heartburn., Disp: 30 tablet, Rfl: 5    gabapentin (NEURONTIN) 600 MG tablet, Take 1 tablet by mouth 3 (Three) Times a Day., Disp: , Rfl:     HYDROcodone-acetaminophen (NORCO)  MG per tablet, Take 1 tablet by mouth Every 6 (Six) Hours As Needed for Moderate (Pain)., Disp: 16 tablet, Rfl: 0    hydrOXYzine (ATARAX) 50 MG tablet, Take 1 tablet by mouth 2 (Two) Times a Day., Disp: , Rfl:     lansoprazole (PREVACID) 30 MG capsule, Take 1 capsule by mouth 2 (Two) Times a Day. Take one capsule 30 minutes prior to eating breakfast, Disp: 60 capsule, Rfl: 5    methocarbamol (ROBAXIN) 750 MG tablet, Take 1 tablet by mouth every night at bedtime., Disp: , Rfl:     montelukast (SINGULAIR) 10 MG tablet, TAKE 1 TABLET BY MOUTH EVERY NIGHT, Disp: 30 tablet, Rfl: 5    OLANZapine (zyPREXA) 7.5 MG tablet, Take 1 tablet by mouth Every Night., Disp: , Rfl:     propranolol (INDERAL) 10 MG tablet, Take 1 tablet by mouth Daily., Disp: , Rfl:     QUEtiapine (SEROquel) 300 MG tablet, Take 1 tablet by mouth Every Night.,  Disp: , Rfl:     rOPINIRole (REQUIP) 3 MG tablet, , Disp: , Rfl:     Tezepelumab-ekko (Tezspire) 210 MG/1.91ML solution auto-injector, Inject 1.91 mL under the skin into the appropriate area as directed Every 28 (Twenty-Eight) Days., Disp: 1.91 mL, Rfl: 5    Tiotropium Bromide Monohydrate (Spiriva Respimat) 1.25 MCG/ACT aerosol solution inhaler, Inhale 2 puffs Daily., Disp: 4 g, Rfl: 11    Medicines reviewed by Alice Burnham, PharmD on 12/16/2024 at  3:03 PM    Drug Interactions  Propranolol (for anxiety); Diclofenac     Initial Education Provided for Specialty Medication  The patient has been provided with the following education and any applicable administration techniques (i.e. self-injection) have been demonstrated for the therapies indicated. All questions and concerns have been addressed prior to the patient receiving the medication, and the patient has verbalized comprehension of the education and any materials provided. Additional patient education shall be provided and documented upon request by the patient, provider, or payer.    Tezspire Prefilled Pen (auto-injector) 210 mg SubQ every 4 weeks for severe asthma. Medication education and counseling provided.  Patient counseled on potential side effects including injection-site reactions, back pain, joint pain and mouth or throat pain/irritation.  Educated Pt on the potential for opportunistic infection of helminth infections. Advised Pt to avoid live vaccines while on this medication. Discussed S/Sx of an allergic reaction. Counseled Pt to go to ED with any S/Sx of allergic reaction. Medication guide provided. Counseled Pt medication should be kept in refrigerator until ready for use.  Bring to room temperature (about 60 minutes) prior to use.  Do not freeze, shake, or expose to heat.  Once at room temperature, do not put back in fridge.  Use within 30 days of bringing to room temperature. Counseled on proper injection technique and disposal.  Pt may inject  in thigh or stomach (avoid 2 inches of navel). Caregiver may inject in upper arm.  Rotate injection sites.  Avoid tender/red/bruised skin.       Adherence, Self-Administration, and Current Therapy Problems  Adherence related to the patient's specialty therapy was discussed with the patient. The Adherence segment of this outreach has been reviewed and updated.     Is there a concern with patient's ability to self administer the medication correctly and without issue?: No  Were any potential barriers to adherence identified during the initial assessment or patient education?: No  Are there any concerns regarding the patient's understanding of the importance of medication adherence?: No  Methods for Supporting Patient Adherence and/or Self-Administration: See plan if applicable    Open Medication Therapy Problems  No medication therapy recommendations to display    Goals of Therapy  Goals related to the patient's specialty therapy were discussed with the patient. The Patient Goals segment of this outreach has been reviewed and updated.   Goals Addressed Today        Specialty Pharmacy General Goal      Reduce symptoms by 50% from baseline            Reassessment Plan & Follow-Up  Medication Therapy Changes: Patient will continue Tezspire Prefilled Pen (auto-injector) 210 mg SubQ every 4 weeks for severe asthma. Medication education and counseling provided below.    Related Plans, Therapy Recommendations, or Therapy Problems to Be Addressed: None  Recommended vaccinations- None  Patient will continue regular follow-up with pulmonology.  Patient will follow-up with specialty mail out services.  Care Coordinator to set up future refill outreaches, coordinate prescription delivery, and escalate clinical questions to pharmacist.  Will follow-up in 6 months, or sooner if needed. Pharmacist to perform regular assessments no more than (6) months from the previous assessment.   Welcome information and patient satisfaction  survey to be sent by specialty pharmacy team with patient's initial fill.    Attestation  Therapeutic appropriateness: Appropriate   I attest the patient was actively involved in and has agreed to the above plan of care. If the prescribed therapy is at any point deemed not appropriate based on the current or future assessments, a consultation will be initiated with the patient's specialty care provider to determine the best course of action. The revised plan of therapy will be documented along with any required assessments and/or additional patient education provided.     Alice Burnham, PharmD  12/16/2024  15:09 EST

## 2024-12-31 DIAGNOSIS — J45.50 SEVERE PERSISTENT ASTHMA WITHOUT COMPLICATION: ICD-10-CM

## 2024-12-31 RX ORDER — MONTELUKAST SODIUM 10 MG/1
10 TABLET ORAL NIGHTLY
Qty: 30 TABLET | Refills: 5 | Status: SHIPPED | OUTPATIENT
Start: 2024-12-31

## 2025-01-28 ENCOUNTER — TELEPHONE (OUTPATIENT)
Dept: GASTROENTEROLOGY | Facility: CLINIC | Age: 55
End: 2025-01-28
Payer: MEDICARE

## 2025-01-28 NOTE — TELEPHONE ENCOUNTER
"CLARUS MESSAGE LEFT      \"I just got out of the hospital for diverticulitis, and I am due for a colonoscopy. Another surgeon saw me while I was in the hospital, but I need to go on and get, I was due in November for a colonoscopy, so I need to go on and get that set up, scheduled, possibly a visit before, I don't know. Somebody can give me a call back, [phone].\"  "

## 2025-02-13 ENCOUNTER — OFFICE VISIT (OUTPATIENT)
Dept: NEUROLOGY | Facility: CLINIC | Age: 55
End: 2025-02-13
Payer: MEDICARE

## 2025-02-13 ENCOUNTER — LAB (OUTPATIENT)
Dept: LAB | Facility: HOSPITAL | Age: 55
End: 2025-02-13
Payer: MEDICARE

## 2025-02-13 VITALS
HEIGHT: 70 IN | BODY MASS INDEX: 26.31 KG/M2 | WEIGHT: 183.8 LBS | SYSTOLIC BLOOD PRESSURE: 132 MMHG | DIASTOLIC BLOOD PRESSURE: 88 MMHG | HEART RATE: 90 BPM | OXYGEN SATURATION: 97 % | RESPIRATION RATE: 14 BRPM | TEMPERATURE: 97.5 F

## 2025-02-13 DIAGNOSIS — G47.10 HYPERSOMNIA: ICD-10-CM

## 2025-02-13 DIAGNOSIS — R41.89 SUBJECTIVE MEMORY COMPLAINTS: ICD-10-CM

## 2025-02-13 DIAGNOSIS — R41.89 SUBJECTIVE MEMORY COMPLAINTS: Primary | ICD-10-CM

## 2025-02-13 LAB
ALBUMIN SERPL-MCNC: 4.4 G/DL (ref 3.5–5.2)
ALBUMIN/GLOB SERPL: 1.6 G/DL
ALP SERPL-CCNC: 127 U/L (ref 39–117)
ALT SERPL W P-5'-P-CCNC: 69 U/L (ref 1–41)
ANION GAP SERPL CALCULATED.3IONS-SCNC: 8 MMOL/L (ref 5–15)
AST SERPL-CCNC: 37 U/L (ref 1–40)
BASOPHILS # BLD AUTO: 0.04 10*3/MM3 (ref 0–0.2)
BASOPHILS NFR BLD AUTO: 0.7 % (ref 0–1.5)
BILIRUB SERPL-MCNC: 0.4 MG/DL (ref 0–1.2)
BUN SERPL-MCNC: 9 MG/DL (ref 6–20)
BUN/CREAT SERPL: 7.8 (ref 7–25)
CALCIUM SPEC-SCNC: 9.2 MG/DL (ref 8.6–10.5)
CHLORIDE SERPL-SCNC: 102 MMOL/L (ref 98–107)
CO2 SERPL-SCNC: 27 MMOL/L (ref 22–29)
CREAT SERPL-MCNC: 1.15 MG/DL (ref 0.76–1.27)
DEPRECATED RDW RBC AUTO: 41.9 FL (ref 37–54)
EGFRCR SERPLBLD CKD-EPI 2021: 74.7 ML/MIN/1.73
EOSINOPHIL # BLD AUTO: 0.09 10*3/MM3 (ref 0–0.4)
EOSINOPHIL NFR BLD AUTO: 1.6 % (ref 0.3–6.2)
ERYTHROCYTE [DISTWIDTH] IN BLOOD BY AUTOMATED COUNT: 12.9 % (ref 12.3–15.4)
FOLATE SERPL-MCNC: 7.39 NG/ML (ref 4.78–24.2)
GLOBULIN UR ELPH-MCNC: 2.7 GM/DL
GLUCOSE SERPL-MCNC: 89 MG/DL (ref 65–99)
HCT VFR BLD AUTO: 49 % (ref 37.5–51)
HGB BLD-MCNC: 16.2 G/DL (ref 13–17.7)
IMM GRANULOCYTES # BLD AUTO: 0.03 10*3/MM3 (ref 0–0.05)
IMM GRANULOCYTES NFR BLD AUTO: 0.5 % (ref 0–0.5)
LYMPHOCYTES # BLD AUTO: 1.35 10*3/MM3 (ref 0.7–3.1)
LYMPHOCYTES NFR BLD AUTO: 23.9 % (ref 19.6–45.3)
MCH RBC QN AUTO: 29.2 PG (ref 26.6–33)
MCHC RBC AUTO-ENTMCNC: 33.1 G/DL (ref 31.5–35.7)
MCV RBC AUTO: 88.4 FL (ref 79–97)
MONOCYTES # BLD AUTO: 0.43 10*3/MM3 (ref 0.1–0.9)
MONOCYTES NFR BLD AUTO: 7.6 % (ref 5–12)
NEUTROPHILS NFR BLD AUTO: 3.71 10*3/MM3 (ref 1.7–7)
NEUTROPHILS NFR BLD AUTO: 65.7 % (ref 42.7–76)
NRBC BLD AUTO-RTO: 0 /100 WBC (ref 0–0.2)
PLATELET # BLD AUTO: 235 10*3/MM3 (ref 140–450)
PMV BLD AUTO: 9.4 FL (ref 6–12)
POTASSIUM SERPL-SCNC: 4.4 MMOL/L (ref 3.5–5.2)
PROT SERPL-MCNC: 7.1 G/DL (ref 6–8.5)
RBC # BLD AUTO: 5.54 10*6/MM3 (ref 4.14–5.8)
SODIUM SERPL-SCNC: 137 MMOL/L (ref 136–145)
VIT B12 BLD-MCNC: 823 PG/ML (ref 211–946)
WBC NRBC COR # BLD AUTO: 5.65 10*3/MM3 (ref 3.4–10.8)

## 2025-02-13 PROCEDURE — 83520 IMMUNOASSAY QUANT NOS NONAB: CPT

## 2025-02-13 PROCEDURE — 83825 ASSAY OF MERCURY: CPT

## 2025-02-13 PROCEDURE — 82746 ASSAY OF FOLIC ACID SERUM: CPT

## 2025-02-13 PROCEDURE — 99214 OFFICE O/P EST MOD 30 MIN: CPT | Performed by: NURSE PRACTITIONER

## 2025-02-13 PROCEDURE — 82175 ASSAY OF ARSENIC: CPT

## 2025-02-13 PROCEDURE — 1160F RVW MEDS BY RX/DR IN RCRD: CPT | Performed by: NURSE PRACTITIONER

## 2025-02-13 PROCEDURE — 1159F MED LIST DOCD IN RCRD: CPT | Performed by: NURSE PRACTITIONER

## 2025-02-13 PROCEDURE — 85025 COMPLETE CBC W/AUTO DIFF WBC: CPT

## 2025-02-13 PROCEDURE — 83655 ASSAY OF LEAD: CPT

## 2025-02-13 PROCEDURE — 36415 COLL VENOUS BLD VENIPUNCTURE: CPT

## 2025-02-13 PROCEDURE — 82607 VITAMIN B-12: CPT

## 2025-02-13 PROCEDURE — 80053 COMPREHEN METABOLIC PANEL: CPT

## 2025-02-13 PROCEDURE — 82300 ASSAY OF CADMIUM: CPT

## 2025-02-13 NOTE — PROGRESS NOTES
New Patient Office Visit      Patient Name: Reji Linares  : 1970   MRN: [id number]     Referring Physician: Gerson Goodman A*    Chief Complaint:    Chief Complaint   Patient presents with    [facility] Care     Memory; pt reports he has been forgetful for about a year.     MRI BRAIN 2024.       History of Present Illness: Reji Linares is a 54 y.o. male who is here today to establish care with Neurology.  He has never been seen before in Neurology. He was referred to us from PCP (Rex) for subjective memory impairment. He is here today with his wife who is providing history.     Losing items around the house such as keys, garage  with symptom onset > 1 year ago  He admits he suffers from OCD, Depression and Anxiety and things have to be done the way he wants-- can't stand having dirty dishes in the sink. Gets impatient with people quickly. Reports he was always an average to poor student and has struggled with inattention his entire life. He feels he has a lot on his mind. He has always been a scattered person. He is an established pt of Psychiatry (Sammie Thomas) for medication management and counseling. Recent Seroquel dose reduction has helped with symptom improvement.  Brother and Wife feel he has memory impairment  Co managing household cooking, cleaning and shopping  Self ADLs.  He is driving.  Wife is managing household finances.     + PMH of MAK (untreated)  + [place] need a hearing aids and refuses    Social: Happily , Kanika. Three children (2 kids in college at home). He is disabled from [employer] for Broken Back . HS graduate. No tobacco. No recreational drugs or cannabis. No ETOH. No  service.     [facility] Neuro: Mother- CVA    Recent Imaging:     MRI Brain W/- noncontributory   MRI Angiogram Head W/- noncontributory     Pertinent Medical History: No TBI, anxiety, OA, asthma, CTS, GERD, HA, MAK, chronic back pain, depression     Subjective       Review of Systems:   Review of Systems   Constitutional: Negative.    HENT: Negative.     Eyes: Negative.    Respiratory: Negative.     Cardiovascular: Negative.    Gastrointestinal: Negative.    Endocrine: Negative.    Genitourinary: Negative.    Musculoskeletal: Negative.    Skin: Negative.    Allergic/Immunologic: Negative.    Neurological:  Positive for memory problem.   Hematological: Negative.    Psychiatric/Behavioral: Negative.     All other systems reviewed and are negative.      Past Medical History:   Past Medical History:   Diagnosis Date    Anxiety     Arthritis     Asthma     CTS (carpal tunnel syndrome)     GERD (gastroesophageal reflux disease)     Headache     Low back pain     Sleep apnea     no c-pap       Past Surgical History:   Past Surgical History:   Procedure Laterality Date    CIRCUMCISION N/A 10/23/2023    Procedure: CIRCUMCISION AND CYSTOSCOPY;  Surgeon: Jhon Jimenez MD;  Location: [facility];  Service: Urology;  Laterality: N/A;    COLONOSCOPY      ENDOSCOPY      with dilatation    MI MYELOGRAPHY VIA LUMBAR INJECTION RS&I CERVICAL N/A 10/29/2019    Procedure: IR myelogram, cervical;  Surgeon: Jose Daniel Kirby MD;  Location: [facility] INVASIVE LOCATION;  Service: Interventional Radiology    URETHRAL DILATATION N/A 10/23/2023    Procedure: URETHRAL DILATATION;  Surgeon: Jhon Jimenez MD;  Location: [facility];  Service: Urology;  Laterality: N/A;       Family History:   Family History   Problem Relation Age of Onset    Heart disease Mother     Arthritis Mother     Diabetes Mother     Stroke Mother     Heart disease Father     Arthritis Father        Social History:   Social History     Socioeconomic History    Marital status:    Tobacco Use    Smoking status: Never     Passive exposure: Never    Smokeless tobacco: Never    Tobacco comments:     none   Vaping Use    Vaping status: Never Used   Substance and Sexual Activity    Alcohol use: No    Drug use: No     Sexual activity: Yes     Partners: Female     Birth control/protection: None       Medications:     Current Outpatient Medications:     albuterol sulfate  (90 Base) MCG/ACT inhaler, Inhale 2 puffs Every 4 (Four) Hours As Needed for Wheezing or Shortness of Air., Disp: 18 g, Rfl: 11    amitriptyline (ELAVIL) 50 MG tablet, , Disp: , Rfl:     budesonide-formoterol (SYMBICORT) 160-4.5 MCG/ACT inhaler, Inhale 2 puffs 2 (Two) Times a Day., Disp: 10.2 g, Rfl: 11    celecoxib (CeleBREX) 200 MG capsule, 1 capsule Daily., Disp: , Rfl:     cephalexin (KEFLEX) 500 MG capsule, , Disp: , Rfl:     diclofenac (VOLTAREN) 75 MG EC tablet, Take 1 tablet by mouth 2 (Two) Times a Day As Needed., Disp: , Rfl:     DULoxetine (CYMBALTA) 60 MG capsule, Take 1 capsule by mouth 2 (Two) Times a Day., Disp: , Rfl:     famotidine (PEPCID) 40 MG tablet, Take 1 tablet by mouth Daily As Needed for Heartburn., Disp: 30 tablet, Rfl: 5    gabapentin (NEURONTIN) 600 MG tablet, Take 1 tablet by mouth 3 (Three) Times a Day., Disp: , Rfl:     HYDROcodone-acetaminophen (NORCO)  MG per tablet, Take 1 tablet by mouth Every 6 (Six) Hours As Needed for Moderate (Pain)., Disp: 16 tablet, Rfl: 0    hydrOXYzine (ATARAX) 50 MG tablet, Take 1 tablet by mouth 2 (Two) Times a Day., Disp: , Rfl:     lansoprazole (PREVACID) 30 MG capsule, Take 1 capsule by mouth 2 (Two) Times a Day. Take one capsule 30 minutes prior to eating breakfast, Disp: 60 capsule, Rfl: 5    methocarbamol (ROBAXIN) 750 MG tablet, Take 1 tablet by mouth every night at bedtime., Disp: , Rfl:     montelukast (SINGULAIR) 10 MG tablet, TAKE 1 TABLET BY MOUTH EVERY NIGHT, Disp: 30 tablet, Rfl: 5    OLANZapine (zyPREXA) 7.5 MG tablet, Take 1 tablet by mouth Every Night., Disp: , Rfl:     propranolol (INDERAL) 10 MG tablet, Take 1 tablet by mouth Daily., Disp: , Rfl:     QUEtiapine (SEROquel) 300 MG tablet, Take 1 tablet by mouth Every Night., Disp: , Rfl:     rOPINIRole (REQUIP) 3 MG  "tablet, , Disp: , Rfl:     Tezepelumab-ekko (Tezspire) 210 MG/1.91ML solution auto-injector, Inject 1.91 mL under the skin into the appropriate area as directed Every 28 (Twenty-Eight) Days., Disp: 1.91 mL, Rfl: 5    Tiotropium Bromide Monohydrate (Spiriva Respimat) 1.25 MCG/ACT aerosol solution inhaler, Inhale 2 puffs Daily., Disp: 4 g, Rfl: 11    Allergies:   No Known Allergies    Objective     Physical Exam:  Vital Signs:   Vitals:    02/13/25 1047   BP: 132/88   BP Location: Left arm   Patient Position: Sitting   Cuff Size: Adult   Pulse: 90   Resp: 14   Temp: 97.5 °F (36.4 °C)   TempSrc: Infrared   SpO2: 97%   Weight: 83.4 kg (183 lb 12.8 oz)   Height: 177.8 cm (70\")   PainSc: 0-No pain     Body mass index is 26.37 kg/m².     Physical Exam  Vitals and nursing note reviewed.   Constitutional:       General: He is not in acute distress.     Appearance: Normal appearance.   HENT:      Head: Normocephalic.      Nose: Nose normal.      Mouth/Throat:      Mouth: Mucous membranes are moist.      Pharynx: Oropharynx is clear.   Eyes:      General: Lids are normal.      Extraocular Movements: Extraocular movements intact.      Conjunctiva/sclera: Conjunctivae normal.      Pupils: Pupils are equal, round, and reactive to light.   Musculoskeletal:      Cervical back: Normal range of motion and neck supple.   Skin:     General: Skin is warm and dry.      Capillary Refill: Capillary refill takes less than 2 seconds.   Neurological:      General: No focal deficit present.      Mental Status: He is oriented to person, place, and time.      Motor: Motor strength is normal.     Coordination: Romberg sign positive.   Psychiatric:         Mood and Affect: Mood normal.         Behavior: Behavior normal.         Neurological Exam  Mental Status  Awake, alert and oriented to person, place and time. Oriented to person, place, and time. Recalls 3 of 3 objects immediately. At 5 minutes recalls 3 of 3 objects. Able to copy figure. Able " to name objects, name parts of objects, repeat, read and write. Follows complex commands. Unable to perform serial calculations. Difficulty spelling words backwards. Digit span was 3 forward and 3 backward. MMSE score: 25.    Cranial Nerves  CN II: Visual acuity is normal. Visual fields full to confrontation.  CN III, IV, VI: Extraocular movements intact bilaterally. Normal lids and orbits bilaterally. Pupils equal round and reactive to light bilaterally.  CN V: Facial sensation is normal.  CN VII: Full and symmetric facial movement.  CN IX, X: Palate elevates symmetrically. Normal gag reflex.  CN XI: Shoulder shrug strength is normal.  CN XII: Tongue midline without atrophy or fasciculations.    Motor  Normal muscle bulk throughout. No fasciculations present. Normal muscle tone. No abnormal involuntary movements. Strength is 5/5 throughout all four extremities.    Sensory  Light touch is normal in upper and lower extremities.     Coordination  Right: Finger-to-nose normal. Rapid alternating movement normal.Left: Finger-to-nose normal. Rapid alternating movement normal.    Gait  Casual gait is normal including stance, stride, and arm swing. Romberg is present. Able to rise from chair without using arms.      PHQ-9 Total Score: 3      Assessment / Plan      Assessment/Plan:   Diagnoses and all orders for this visit:    1. Subjective memory complaints (Primary)  -     Ambulatory Referral to Behavioral Health  -     Vitamin B12; Future  -     Folate; Future  -     Heavy Metals Profile II, Blood; Future  -     ATN Profile; Future  -     CBC & Differential; Future  -     Comprehensive Metabolic Panel; Future    2. Hypersomnia  -     Home Sleep Study; Future         Follow Up:   Return in about 3 months (around 5/13/2025), or if symptoms worsen or fail to improve.    Anticipatory Guidance and Safety Reviewed  Patient Education Provided  MMSE 25/30  Home Sleep Study  [place]  Scale 2  PHQ9 score 3  Behavioral Health  Referral for + ASRS v1.1 and Gene Site Testing   Declined Neuropsychology referral   Reviewed MRI and discussed   Labs: CBC, CMP, Vitamin B12, Folate, ATN, Heavy Metal Profile     RTC PRN or within 12 weeks or sooner with issues     MERLIN Anthony  [facility]

## 2025-02-17 ENCOUNTER — PATIENT ROUNDING (BHMG ONLY) (OUTPATIENT)
Dept: NEUROLOGY | Facility: CLINIC | Age: 55
End: 2025-02-17
Payer: MEDICARE

## 2025-02-20 LAB
ARSENIC BLD-MCNC: 1 UG/L (ref 0–9)
CADMIUM BLD-MCNC: <0.5 UG/L (ref 0–1.2)
LEAD BLDV-MCNC: <1 UG/DL (ref 0–3.4)
MERCURY BLD-MCNC: <1 UG/L (ref 0–14.9)

## 2025-02-21 LAB
A -- BETA-AMYLOID 42/40 RATIO: 0.13
AL BETA40 PLAS-MCNC: 201.85 PG/ML
AL BETA42 PLAS-MCNC: 26.98 PG/ML
ATN SUMMARY1: NORMAL
INFORMATION:: NORMAL
N -- NFL, PLASMA: 2.55 PG/ML (ref 0–3.78)
T -- P-TAU181: 0.84 PG/ML (ref 0–0.97)

## 2025-03-07 ENCOUNTER — SPECIALTY PHARMACY (OUTPATIENT)
Dept: PHARMACY | Facility: HOSPITAL | Age: 55
End: 2025-03-07
Payer: MEDICARE

## 2025-03-07 NOTE — PROGRESS NOTES
Specialty Pharmacy Patient Management Program  Refill Outreach     Reji was contacted today regarding refills of their medication(s) Tezspire     Refill Questions      Flowsheet Row Most Recent Value   Changes to allergies? No   Changes to medications? No   New conditions or infections since last clinic visit No   Unplanned office visit, urgent care, ED, or hospital admission in the last 4 weeks  No   How does patient/caregiver feel medication is working? Very good   Financial problems or insurance changes  No   Since the previous refill, were any specialty medication doses or scheduled injections missed or delayed?  No   Does this patient require a clinical escalation to a pharmacist? No            Delivery Questions      Flowsheet Row Most Recent Value   Delivery method [employer]   Delivery address verified with patient/caregiver? Yes   Delivery address Home   Number of medications in delivery 1   Medication(s) being filled and delivered Tezepelumab-ekko (Tezspire)   Doses left of specialty medications 0   Copay verified? Yes   Copay amount $0   Copay form of payment No copayment ($0)   Delivery Date Selection 03/11/25                 Follow-up: 84 day(s)     Ema Barrios, Pharmacy Technician  3/7/2025  14:19 EST

## 2025-03-10 ENCOUNTER — HOSPITAL ENCOUNTER (OUTPATIENT)
Dept: SLEEP MEDICINE | Facility: HOSPITAL | Age: 55
Discharge: HOME | End: 2025-03-10
Admitting: NURSE PRACTITIONER
Payer: MEDICARE

## 2025-03-10 DIAGNOSIS — G47.10 HYPERSOMNIA: ICD-10-CM

## 2025-03-10 PROCEDURE — G0399 HOME SLEEP TEST/TYPE 3 PORTA: HCPCS

## 2025-03-18 ENCOUNTER — OFFICE VISIT (OUTPATIENT)
Dept: PSYCHIATRY | Facility: CLINIC | Age: 55
End: 2025-03-18
Payer: MEDICARE

## 2025-03-18 VITALS
HEIGHT: 70 IN | BODY MASS INDEX: 26.34 KG/M2 | SYSTOLIC BLOOD PRESSURE: 138 MMHG | WEIGHT: 184 LBS | DIASTOLIC BLOOD PRESSURE: 88 MMHG | HEART RATE: 86 BPM | OXYGEN SATURATION: 99 %

## 2025-03-18 DIAGNOSIS — F43.10 POST TRAUMATIC STRESS DISORDER (PTSD): ICD-10-CM

## 2025-03-18 DIAGNOSIS — F42.2 MIXED OBSESSIONAL THOUGHTS AND ACTS: ICD-10-CM

## 2025-03-18 DIAGNOSIS — F33.1 MAJOR DEPRESSIVE DISORDER, RECURRENT EPISODE, MODERATE: ICD-10-CM

## 2025-03-18 DIAGNOSIS — F41.1 GENERALIZED ANXIETY DISORDER: Primary | ICD-10-CM

## 2025-03-18 RX ORDER — NIRMATRELVIR AND RITONAVIR 300-100 MG
KIT ORAL SEE ADMIN INSTRUCTIONS
COMMUNITY
Start: 2025-02-05 | End: 2025-03-18

## 2025-03-18 RX ORDER — BUSPIRONE HYDROCHLORIDE 30 MG/1
TABLET ORAL
COMMUNITY
Start: 2024-12-31
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
## 2025-05-09 ENCOUNTER — OFFICE VISIT (OUTPATIENT)
Dept: NEUROLOGY | Facility: CLINIC | Age: 55
End: 2025-05-09
Payer: MEDICARE

## 2025-05-09 VITALS
OXYGEN SATURATION: 97 % | RESPIRATION RATE: 14 BRPM | HEIGHT: 70 IN | TEMPERATURE: 97.5 F | DIASTOLIC BLOOD PRESSURE: 76 MMHG | BODY MASS INDEX: 28.43 KG/M2 | HEART RATE: 79 BPM | SYSTOLIC BLOOD PRESSURE: 122 MMHG | WEIGHT: 198.6 LBS

## 2025-05-09 DIAGNOSIS — G47.10 HYPERSOMNIA: ICD-10-CM

## 2025-05-09 DIAGNOSIS — R41.89 SUBJECTIVE MEMORY COMPLAINTS: Primary | ICD-10-CM

## 2025-05-09 NOTE — PROGRESS NOTES
Follow Up Office Visit      Patient Name: Reji Linares  : 1970   MRN: [id number]     Chief Complaint:    Chief Complaint   Patient presents with    Follow-up     Memory       History of Present Illness: Reji Linares is a 54 y.o. male who is here today to follow up with neurology for subjective memory complaints and hypersomnia.  He was last seen in clinic  (Daphnie).      He was sent for a home sleep study and sent to behavioral health for a positive ADHD screen and GeneSight testing at previous encounter.  He reports no changes with memory.  He and wife are both surprised he does not have MAK he has a positive past medical history of this.  Wife is worried about his psychiatric medications.  He is an established patient of psychiatry (Sammie Thomas) and has been taking amitriptyline 50 mg nightly, BuSpar 30 mg, duloxetine 60 mg twice daily, gabapentin 600 mg 3 times daily, propranolol 10 mg daily, and Seroquel 300 mg nightly.  Wife reports he takes all this medication at once at night and then she feels he cannot speak complete sentences or wake up from his deep sleep and is groggy in a.m.    Taken from previous encounter:    Losing items around the house such as keys, garage  with symptom onset > 1 year ago  He admits he suffers from OCD, Depression and Anxiety and things have to be done the way he wants-- can't stand having dirty dishes in the sink. Gets impatient with people quickly. Reports he was always an average to poor student and has struggled with inattention his entire life. He feels he has a lot on his mind. He has always been a scattered person. He is an established pt of Psychiatry (Sammie Thomas) for medication management and counseling. Recent Seroquel dose reduction has helped with symptom improvement. Brother and Wife feel he has memory impairment. Co managing household cooking, cleaning and shopping  Self ADLs. He is driving. Wife is managing household  finances.     Recent Imaging:      ATN Profile 02/25- negative   Home Sleep Study 03/25 - no MAK with AHI 3/hr  MRI Brain W/WO 05/24- noncontributory   MRI Angiogram Head W/WO 05/24- noncontributory      Pertinent Medical History: No TBI, anxiety, OA, asthma, CTS, GERD, HA, MAK, chronic back pain, depression     Subjective      Review of Systems:   Review of Systems   Constitutional: Negative.    HENT: Negative.     Eyes: Negative.    Respiratory: Negative.     Cardiovascular: Negative.    Gastrointestinal: Negative.    Endocrine: Negative.    Genitourinary: Negative.    Musculoskeletal: Negative.    Skin: Negative.    Allergic/Immunologic: Negative.    Neurological:  Positive for memory problem.   Hematological: Negative.    Psychiatric/Behavioral: Negative.  Positive for depressed mood.    All other systems reviewed and are negative.      I have reviewed and the following portions of the patient's history were updated as appropriate: past family history, past medical history, past social history, past surgical history and problem list.    Medications:     Current Outpatient Medications:     albuterol sulfate  (90 Base) MCG/ACT inhaler, Inhale 2 puffs Every 4 (Four) Hours As Needed for Wheezing or Shortness of Air., Disp: 18 g, Rfl: 11    amitriptyline (ELAVIL) 50 MG tablet, , Disp: , Rfl:     budesonide-formoterol (SYMBICORT) 160-4.5 MCG/ACT inhaler, Inhale 2 puffs 2 (Two) Times a Day., Disp: 10.2 g, Rfl: 11    busPIRone (BUSPAR) 30 MG tablet, , Disp: , Rfl:     celecoxib (CeleBREX) 200 MG capsule, 1 capsule Daily., Disp: , Rfl:     DULoxetine (CYMBALTA) 60 MG capsule, Take 1 capsule by mouth 2 (Two) Times a Day., Disp: , Rfl:     famotidine (PEPCID) 40 MG tablet, Take 1 tablet by mouth Daily As Needed for Heartburn., Disp: 30 tablet, Rfl: 5    gabapentin (NEURONTIN) 600 MG tablet, Take 1 tablet by mouth 3 (Three) Times a Day., Disp: , Rfl:     HYDROcodone-acetaminophen (NORCO)  MG per tablet, Take 1  "tablet by mouth Every 6 (Six) Hours As Needed for Moderate (Pain)., Disp: 16 tablet, Rfl: 0    hydrOXYzine (ATARAX) 50 MG tablet, Take 1 tablet by mouth 2 (Two) Times a Day., Disp: , Rfl:     lansoprazole (PREVACID) 30 MG capsule, Take 1 capsule by mouth 2 (Two) Times a Day. Take one capsule 30 minutes prior to eating breakfast, Disp: 60 capsule, Rfl: 5    montelukast (SINGULAIR) 10 MG tablet, TAKE 1 TABLET BY MOUTH EVERY NIGHT, Disp: 30 tablet, Rfl: 5    propranolol (INDERAL) 10 MG tablet, Take 1 tablet by mouth Daily., Disp: , Rfl:     QUEtiapine (SEROquel) 300 MG tablet, Take 1 tablet by mouth Every Night., Disp: , Rfl:     rOPINIRole (REQUIP) 3 MG tablet, , Disp: , Rfl:     Tezepelumab-ekko (Tezspire) 210 MG/1.91ML solution auto-injector, Inject 1.91 mL under the skin into the appropriate area as directed Every 28 (Twenty-Eight) Days., Disp: 1.91 mL, Rfl: 5    Allergies:   No Known Allergies    Objective     Physical Exam:  Vital Signs:   Vitals:    05/09/25 0921   BP: 122/76   BP Location: Left arm   Patient Position: Sitting   Cuff Size: Adult   Pulse: 79   Resp: 14   Temp: 97.5 °F (36.4 °C)   TempSrc: Temporal   SpO2: 97%   Weight: 90.1 kg (198 lb 9.6 oz)   Height: 177.8 cm (70\")   PainSc: 0-No pain     Body mass index is 28.5 kg/m².    Physical Exam  Vitals and nursing note reviewed.   Constitutional:       General: He is not in acute distress.     Appearance: Normal appearance.   HENT:      Head: Normocephalic.      Nose: Nose normal.      Mouth/Throat:      Mouth: Mucous membranes are moist.      Pharynx: Oropharynx is clear.   Eyes:      General: Lids are normal.      Extraocular Movements: Extraocular movements intact.      Conjunctiva/sclera: Conjunctivae normal.      Pupils: Pupils are equal, round, and reactive to light.   Musculoskeletal:      Cervical back: Normal range of motion and neck supple.   Skin:     General: Skin is warm and dry.      Capillary Refill: Capillary refill takes less than 2 " seconds.   Neurological:      General: No focal deficit present.      Mental Status: He is alert and oriented to person, place, and time.      Coordination: Romberg sign negative.   Psychiatric:         Mood and Affect: Mood normal.         Speech: Speech normal.         Behavior: Behavior normal.         Neurological Exam  Mental Status  Alert. Oriented to person, place and time. Oriented to person, place, and time. Recalls 3 of 3 objects immediately. At 5 minutes recalls 2 of 3 objects. Recalls 2 of 3 objects with prompting. Unable to copy figure. Speech is normal. Able to name objects, name parts of objects, repeat, read and write. Follows complex commands. Able to perform serial calculations. Able to spell words backwards. Digit span was 5 forward and 5 backward. Fund of knowledge is appropriate for level of education.    Cranial Nerves  CN II: Visual acuity is normal. Visual fields full to confrontation.  CN III, IV, VI: Extraocular movements intact bilaterally. Normal lids and orbits bilaterally. Pupils equal round and reactive to light bilaterally.  CN V: Facial sensation is normal.  CN VII: Full and symmetric facial movement.  CN IX, X: Palate elevates symmetrically. Normal gag reflex.  CN XI: Shoulder shrug strength is normal.  CN XII: Tongue midline without atrophy or fasciculations.    Motor  Normal muscle bulk throughout. No fasciculations present. Normal muscle tone. No abnormal involuntary movements. No pronator drift.    Sensory  Light touch is normal in upper and lower extremities.     Coordination  Right: Finger-to-nose normal. Rapid alternating movement normal.Left: Finger-to-nose normal. Rapid alternating movement normal.    Gait  Casual gait is normal including stance, stride, and arm swing. Romberg is absent. Able to rise from chair without using arms.       Assessment / Plan      Assessment/Plan:   Diagnoses and all orders for this visit:    1. Subjective memory complaints (Primary)    2.  Hypersomnia  -     Polysomnography 4 or More Parameters; Future         Follow Up:   Return in about 3 months (around 8/9/2025), or if symptoms worsen or fail to improve.    Anticipatory Guidance and Safety Reviewed  Patient Education Provided  MMSE 28/30  Declined Neuropsychology Referral   PSG  Keep follow-up with psychiatry for medication management; does not wish to see new provider     RTC PRN or within 12 weeks or sooner with issues       Darlene Anna, JEREMIAS, APRN, FNP-C  [facility]

## 2025-06-04 ENCOUNTER — SPECIALTY PHARMACY (OUTPATIENT)
Dept: PHARMACY | Facility: HOSPITAL | Age: 55
End: 2025-06-04
Payer: MEDICARE

## 2025-06-04 RX ORDER — TEZEPELUMAB-EKKO 210 MG/1.9ML
210 INJECTION, SOLUTION SUBCUTANEOUS
Qty: 5.73 ML | Refills: 2 | Status: SHIPPED | OUTPATIENT
Start: 2025-06-04

## 2025-06-04 NOTE — PROGRESS NOTES
Specialty Pharmacy Patient Management Program  Medication Management Clinic Refill Outreach      Reji was contacted today regarding refills of his medication(s).    Specialty medication(s) and dose(s) confirmed: Tezspire    Refill Questions      Flowsheet Row Most Recent Value   Changes to allergies? No   Changes to medications? No   New conditions or infections since last clinic visit No   Unplanned office visit, urgent care, ED, or hospital admission in the last 4 weeks  No   How does patient/caregiver feel medication is working? Very good   Financial problems or insurance changes  No   Since the previous refill, were any specialty medication doses or scheduled injections missed or delayed?  No   Does this patient require a clinical escalation to a pharmacist? No          Delivery Questions      Flowsheet Row Most Recent Value   Delivery method [employer]   Delivery address verified with patient/caregiver? Yes   Delivery address Home   Other address preferred NA   Number of medications in delivery 1   Medication(s) being filled and delivered Tezepelumab-ekko (Tezspire)   Doses left of specialty medications 0   Copay verified? Yes   Copay amount $0.00   Copay form of payment No copayment ($0)   Delivery Date Selection 06/05/25   Signature Required No   Do you consent to receive electronic handouts?  No            Follow-Up: 84    Olga Lester. Ericka, Dariana  6/4/2025  11:28 EDT

## 2025-06-04 NOTE — PROGRESS NOTES
Specialty Pharmacy Patient Management Program  Asthma Reassessment     Reji Linares was referred by Marielle Kunz to the Patient Management program offered by [facility] for Asthma Management. A follow-up outreach was conducted, including assessment of continued therapy appropriateness, medication adherence, and side effect incidence and management for Juvenal.    The patient's current asthma regimen includes: Tezspire, Symbicort, and PRN albuterol. Pt reports good adherence to maintenance regimen. Pt reports he is not a smoker or exposed to second hand smoke. Pt does not have an asthma action plan      Reji Linares reports asthma kept them from getting as much done some of the time and having shortness of breath more than once day.   Pt reports nighttime awakening not at all due to asthma symptoms and using a rescue inhaler once or twice per day.  Pt self rates asthma control as Well Controlled.       Pt reports continued tolerance of Tezspire and stabilization in breathing symptoms since starting. Patient denies any missed doses     Changes to Insurance Coverage or Financial Support  [employer]    Relevant Past Medical History and Comorbidities  Relevant medical history and concomitant health conditions were discussed with the patient. The patient's chart has been reviewed for relevant past medical history and comorbid health conditions and updated as necessary.   Past Medical History:   Diagnosis Date    Anxiety     Arthritis     Asthma     Asthma, extrinsic     Asthma, intrinsic     Cervical disc disorder     Chronic pain disorder     CTS (carpal tunnel syndrome)     Depression     Fatty liver     Known for years    Fibromyalgia, primary     GERD (gastroesophageal reflux disease)     Headache     HL (hearing loss)     Low back pain     Lumbosacral disc disease     Memory loss     PTSD (post-traumatic stress disorder)     Sleep apnea     no  c-pap    Sleep apnea, obstructive     Thoracic disc disorder     Vision loss      Social History     Socioeconomic History    Marital status:    Tobacco Use    Smoking status: Never     Passive exposure: Never    Smokeless tobacco: Never    Tobacco comments:     none   Vaping Use    Vaping status: Never Used   Substance and Sexual Activity    Alcohol use: Never    Drug use: Never    Sexual activity: Yes     Partners: Female     Birth control/protection: None     Problem list reviewed by Olga Barnett PharmD on 6/4/2025 at 11:17 AM    Hospitalizations and Urgent Care Since Last Assessment  ED Visits, Admissions, or Hospitalizations: none  Urgent Office Visits: none    Allergies  Known allergies and reactions were discussed with the patient. The patient's chart has been reviewed for allergy information and updated as necessary.   No Known Allergies  Allergies reviewed by Olga Barnett PharmD on 6/4/2025 at 11:12 AM    Relevant Laboratory Values  Relevant laboratory values were discussed with the patient.     Lab Results   Component Value Date    GLUCOSE 89 02/13/2025    CALCIUM 9.2 02/13/2025     02/13/2025    K 4.4 02/13/2025    CO2 27.0 02/13/2025     02/13/2025    BUN 9 02/13/2025    CREATININE 1.15 02/13/2025    BCR 7.8 02/13/2025    ANIONGAP 8.0 02/13/2025       Asthma Control Test Results:   0-15 Very Poorly Controlled Asthma   15-20 Poorly Controlled Asthma  20-25 Well Controlled Asthma        Date  8/26/22 3/30/23 9/12/23 1/8/24 4/3/24 8/27/24 12/16/24   ACT Results 15 11 15 16 16 16 16   ACT Results Very Poorly  Controlled Asthma  Very Poorly  Controlled Asthma  Poorly  Controlled Asthma  Poorly  Controlled Asthma  Poorly  Controlled Asthma  Poorly  Controlled Asthma  Poorly  Controlled Asthma      Date  6/4/25   ACT Results 15   ACT Results Very Poorly  Controlled Asthma      Vaccination Status   (LIVE vaccines not recommend with Tezspire and Dupixent)  Influenza:  UTD  Pneumococcal: UTD  Zoster: UTD    Current Medication List  This medication list has been reviewed with the patient and evaluated for any interactions or necessary modifications/recommendations, and updated to include all prescription medications, OTC medications, and supplements the patient is currently taking.  This list reflects what is contained in the patient's profile, which has also been marked as reviewed to communicate to other providers it is the most up to date version of the patient's current medication therapy.     Current Outpatient Medications:     albuterol sulfate  (90 Base) MCG/ACT inhaler, Inhale 2 puffs Every 4 (Four) Hours As Needed for Wheezing or Shortness of Air., Disp: 18 g, Rfl: 11    amitriptyline (ELAVIL) 50 MG tablet, Take 1 tablet by mouth Every Night., Disp: , Rfl:     budesonide-formoterol (SYMBICORT) 160-4.5 MCG/ACT inhaler, Inhale 2 puffs 2 (Two) Times a Day., Disp: 10.2 g, Rfl: 11    busPIRone (BUSPAR) 30 MG tablet, Take 1 tablet by mouth Every Night., Disp: , Rfl:     celecoxib (CeleBREX) 200 MG capsule, Take 1 capsule by mouth 2 (Two) Times a Day., Disp: , Rfl:     DULoxetine (CYMBALTA) 60 MG capsule, Take 1 capsule by mouth 2 (Two) Times a Day., Disp: , Rfl:     famotidine (PEPCID) 40 MG tablet, Take 1 tablet by mouth Daily As Needed for Heartburn., Disp: 30 tablet, Rfl: 5    gabapentin (NEURONTIN) 600 MG tablet, Take 1 tablet by mouth 3 (Three) Times a Day., Disp: , Rfl:     HYDROcodone-acetaminophen (NORCO)  MG per tablet, Take 1 tablet by mouth Every 6 (Six) Hours As Needed for Moderate (Pain)., Disp: 16 tablet, Rfl: 0    hydrOXYzine (ATARAX) 50 MG tablet, Take 1 tablet by mouth 2 (Two) Times a Day., Disp: , Rfl:     lansoprazole (PREVACID) 30 MG capsule, Take 1 capsule by mouth 2 (Two) Times a Day. Take one capsule 30 minutes prior to eating breakfast, Disp: 60 capsule, Rfl: 5    montelukast (SINGULAIR) 10 MG tablet, TAKE 1 TABLET BY MOUTH EVERY NIGHT, Disp:  30 tablet, Rfl: 5    propranolol (INDERAL) 10 MG tablet, Take 1 tablet by mouth Daily., Disp: , Rfl:     QUEtiapine (SEROquel) 300 MG tablet, Take 1 tablet by mouth Every Night., Disp: , Rfl:     rOPINIRole (REQUIP) 3 MG tablet, Take 1 tablet by mouth Every Night., Disp: , Rfl:     Tezepelumab-ekko (Tezspire) 210 MG/1.91ML solution auto-injector, Inject 1.91 mL under the skin into the appropriate area as directed Every 28 (Twenty-Eight) Days., Disp: 5.73 mL, Rfl: 2    Medicines reviewed by Olga Barnett, PharmD on 6/4/2025 at 11:13 AM    Drug Interactions  No significant drug-drug interactions expected with Tezspire according to literature     Adverse Drug Reactions  Medication tolerability: Tolerating with no to minimal ADRs  Medication plan: Continue therapy with normal follow-up  Plan for ADR Management: See plan, if applicable     Adherence, Self-Administration, and Current Therapy Problems  Adherence related to the patient's specialty therapy was discussed with the patient. The Adherence segment of this outreach has been reviewed and updated.     Adherence Questions  Linked Medication(s) Assessed: Tezepelumab-ekko (Tezspire)  On average, how many doses/injections does the patient miss per month?: 0  What are the identified reasons for non-adherence or missed doses? : no problems identified  What is the estimated medication adherence level?: %  Based on the patient/caregiver response and refill history, does this patient require an MTP to track adherence improvements?: no    Additional Barriers to Patient Self-Administration: See plan, if applicable   Methods for Supporting Patient Self-Administration: See plan, if applicable     Open Medication Therapy Problems  No medication therapy recommendations to display    Goals of Therapy  Goals related to the patient's specialty therapy were discussed with the patient. The Patient Goals segment of this outreach has been reviewed and updated.   Goals  Addressed Today        Specialty Pharmacy General Goal      Reduce symptoms by 50% from baseline    6/4/25 TATI: Pt reports a 50% reduction of symptoms from baseline since being on Tezspire              Quality of Life Assessment   Quality of Life related to the patient's enrollment in the patient management program and services provided was discussed with the patient. The QOL segment of this outreach has been reviewed and updated.  Quality of Life Improvement Scale: 6-A little better    Reassessment Plan & Follow-Up  1. Medication Therapy Changes: Pt will continue Tezspire 210 mg every 28 days   2. Related Plans, Therapy Recommendations, or Issues to Be Addressed:   3. Recommended vaccinations- Pt reports UTD  4. Patient will continue regular follow-up with pulmonology- Pt needs follow-up appt scheduled. Marielle Kunz confirmed okay today through secure chat to send in another 6 month of refills. Patient aware to schedule follow-up.   5. Will follow-up in 6 months, or sooner if needed. Pharmacist to perform regular assessments no more than (6) months from the previous assessment.   6. Care Coordinator to set up future refill outreaches, coordinate prescription delivery, and escalate clinical questions to pharmacist.    Attestation  Therapeutic appropriateness: Appropriate   I attest the patient was actively involved in and has agreed to the above plan of care.  If the prescribed therapy is at any point deemed not appropriate based on the current or future assessments, a consultation will be initiated with the patient's specialty care provider to determine the best course of action. The revised plan of therapy will be documented along with any required assessments and/or additional patient education provided.     Olga Lester. Ericka, PharmARIC  6/4/2025  11:26 EDT

## 2025-07-31 DIAGNOSIS — J45.50 SEVERE PERSISTENT ASTHMA WITHOUT COMPLICATION: ICD-10-CM

## 2025-07-31 RX ORDER — MONTELUKAST SODIUM 10 MG/1
10 TABLET ORAL NIGHTLY
Qty: 30 TABLET | Refills: 5 | Status: SHIPPED | OUTPATIENT
Start: 2025-07-31

## 2025-08-16 DIAGNOSIS — K21.9 GASTROESOPHAGEAL REFLUX DISEASE WITHOUT ESOPHAGITIS: ICD-10-CM

## 2025-08-18 RX ORDER — FAMOTIDINE 40 MG/1
40 TABLET, FILM COATED ORAL DAILY PRN
Qty: 30 TABLET | Refills: 5 | OUTPATIENT
Start: 2025-08-18

## 2025-08-20 DIAGNOSIS — K21.9 GASTROESOPHAGEAL REFLUX DISEASE WITHOUT ESOPHAGITIS: ICD-10-CM

## 2025-08-21 ENCOUNTER — SPECIALTY PHARMACY (OUTPATIENT)
Dept: PHARMACY | Facility: HOSPITAL | Age: 55
End: 2025-08-21
Payer: MEDICARE

## 2025-08-21 RX ORDER — FAMOTIDINE 40 MG/1
40 TABLET, FILM COATED ORAL DAILY PRN
Qty: 30 TABLET | Refills: 5 | OUTPATIENT
Start: 2025-08-21

## 2025-08-28 DIAGNOSIS — K21.9 GASTROESOPHAGEAL REFLUX DISEASE WITHOUT ESOPHAGITIS: ICD-10-CM

## 2025-08-28 RX ORDER — FAMOTIDINE 40 MG/1
40 TABLET, FILM COATED ORAL DAILY PRN
Qty: 30 TABLET | Refills: 5 | OUTPATIENT
Start: 2025-08-28

## (undated) DEVICE — DRAPE,LAPAROTOMY,PED,STERILE: Brand: MEDLINE

## (undated) DEVICE — SUT GUT CHRM 3/0 SH 27IN G122H

## (undated) DEVICE — HOLDER: Brand: DEROYAL

## (undated) DEVICE — ELECTRD NDL EZ CLN MOD 2.75IN

## (undated) DEVICE — GLV SURG PREMIERPRO MIC LTX PF SZ8 BRN

## (undated) DEVICE — GOWN,REINF,POLY,ECL,PP SLV,XXL: Brand: MEDLINE

## (undated) DEVICE — PATIENT RETURN ELECTRODE, SINGLE-USE, CONTACT QUALITY MONITORING, ADULT, WITH 9FT CORD, FOR PATIENTS WEIGING OVER 33LBS. (15KG): Brand: MEGADYNE

## (undated) DEVICE — TRY L/P SFTY A/20G

## (undated) DEVICE — SUT GUT CHRM 4/0 RB1 27IN U203H

## (undated) DEVICE — CYSTO/BLADDER IRRIGATION SET, REGULATING CLAMP

## (undated) DEVICE — PENCL ES MEGADINE EZ/CLEAN BUTN W/HOLSTR 10FT

## (undated) DEVICE — PK BASIC 70

## (undated) DEVICE — PREMIUM WET SKIN PREP TRAY: Brand: MEDLINE INDUSTRIES, INC.

## (undated) DEVICE — BNDG GZ SOF-FORM CONFRM 2X75IN LF STRL